# Patient Record
Sex: MALE | Race: WHITE | Employment: OTHER | URBAN - METROPOLITAN AREA
[De-identification: names, ages, dates, MRNs, and addresses within clinical notes are randomized per-mention and may not be internally consistent; named-entity substitution may affect disease eponyms.]

---

## 2018-01-08 ENCOUNTER — HOSPITAL ENCOUNTER (OUTPATIENT)
Age: 73
Setting detail: OUTPATIENT SURGERY
Discharge: HOME | End: 2018-01-08
Attending: INTERNAL MEDICINE | Admitting: INTERNAL MEDICINE
Payer: MEDICARE

## 2018-01-08 ENCOUNTER — ANESTHESIA EVENT (OUTPATIENT)
Dept: ENDOSCOPY | Age: 73
End: 2018-01-08
Payer: MEDICARE

## 2018-01-08 ENCOUNTER — ANESTHESIA (OUTPATIENT)
Dept: ENDOSCOPY | Age: 73
End: 2018-01-08
Payer: MEDICARE

## 2018-01-08 VITALS
WEIGHT: 185 LBS | RESPIRATION RATE: 16 BRPM | BODY MASS INDEX: 25.9 KG/M2 | TEMPERATURE: 97.1 F | DIASTOLIC BLOOD PRESSURE: 70 MMHG | HEIGHT: 71 IN | HEART RATE: 66 BPM | OXYGEN SATURATION: 99 % | SYSTOLIC BLOOD PRESSURE: 111 MMHG

## 2018-01-08 PROCEDURE — 88305 TISSUE EXAM BY PATHOLOGIST: CPT | Performed by: INTERNAL MEDICINE

## 2018-01-08 PROCEDURE — 76060000031 HC ANESTHESIA FIRST 0.5 HR: Performed by: INTERNAL MEDICINE

## 2018-01-08 PROCEDURE — 77030027957 HC TBNG IRR ENDOGTR BUSS -B: Performed by: INTERNAL MEDICINE

## 2018-01-08 PROCEDURE — 76040000019: Performed by: INTERNAL MEDICINE

## 2018-01-08 PROCEDURE — 74011000250 HC RX REV CODE- 250

## 2018-01-08 PROCEDURE — 74011250636 HC RX REV CODE- 250/636

## 2018-01-08 PROCEDURE — 77030009426 HC FCPS BIOP ENDOSC BSC -B: Performed by: INTERNAL MEDICINE

## 2018-01-08 RX ORDER — FENTANYL CITRATE 50 UG/ML
100 INJECTION, SOLUTION INTRAMUSCULAR; INTRAVENOUS
Status: DISCONTINUED | OUTPATIENT
Start: 2018-01-08 | End: 2018-01-08 | Stop reason: HOSPADM

## 2018-01-08 RX ORDER — DIPHENHYDRAMINE HYDROCHLORIDE 50 MG/ML
50 INJECTION, SOLUTION INTRAMUSCULAR; INTRAVENOUS ONCE
Status: DISCONTINUED | OUTPATIENT
Start: 2018-01-08 | End: 2018-01-08 | Stop reason: HOSPADM

## 2018-01-08 RX ORDER — SODIUM CHLORIDE 9 MG/ML
100 INJECTION, SOLUTION INTRAVENOUS CONTINUOUS
Status: DISCONTINUED | OUTPATIENT
Start: 2018-01-08 | End: 2018-01-08 | Stop reason: HOSPADM

## 2018-01-08 RX ORDER — MIDAZOLAM HYDROCHLORIDE 1 MG/ML
.25-1 INJECTION, SOLUTION INTRAMUSCULAR; INTRAVENOUS
Status: DISCONTINUED | OUTPATIENT
Start: 2018-01-08 | End: 2018-01-08 | Stop reason: HOSPADM

## 2018-01-08 RX ORDER — FLUMAZENIL 0.1 MG/ML
0.2 INJECTION INTRAVENOUS
Status: DISCONTINUED | OUTPATIENT
Start: 2018-01-08 | End: 2018-01-08 | Stop reason: HOSPADM

## 2018-01-08 RX ORDER — SODIUM CHLORIDE 0.9 % (FLUSH) 0.9 %
5-10 SYRINGE (ML) INJECTION EVERY 8 HOURS
Status: DISCONTINUED | OUTPATIENT
Start: 2018-01-08 | End: 2018-01-08 | Stop reason: HOSPADM

## 2018-01-08 RX ORDER — PROPOFOL 10 MG/ML
INJECTION, EMULSION INTRAVENOUS AS NEEDED
Status: DISCONTINUED | OUTPATIENT
Start: 2018-01-08 | End: 2018-01-08 | Stop reason: HOSPADM

## 2018-01-08 RX ORDER — LIDOCAINE HYDROCHLORIDE 20 MG/ML
INJECTION, SOLUTION EPIDURAL; INFILTRATION; INTRACAUDAL; PERINEURAL AS NEEDED
Status: DISCONTINUED | OUTPATIENT
Start: 2018-01-08 | End: 2018-01-08 | Stop reason: HOSPADM

## 2018-01-08 RX ORDER — SODIUM CHLORIDE 0.9 % (FLUSH) 0.9 %
5-10 SYRINGE (ML) INJECTION AS NEEDED
Status: DISCONTINUED | OUTPATIENT
Start: 2018-01-08 | End: 2018-01-08 | Stop reason: HOSPADM

## 2018-01-08 RX ORDER — NALOXONE HYDROCHLORIDE 0.4 MG/ML
0.4 INJECTION, SOLUTION INTRAMUSCULAR; INTRAVENOUS; SUBCUTANEOUS
Status: DISCONTINUED | OUTPATIENT
Start: 2018-01-08 | End: 2018-01-08 | Stop reason: HOSPADM

## 2018-01-08 RX ORDER — ATROPINE SULFATE 0.1 MG/ML
0.5 INJECTION INTRAVENOUS
Status: DISCONTINUED | OUTPATIENT
Start: 2018-01-08 | End: 2018-01-08 | Stop reason: HOSPADM

## 2018-01-08 RX ORDER — SODIUM CHLORIDE 9 MG/ML
INJECTION, SOLUTION INTRAVENOUS
Status: DISCONTINUED | OUTPATIENT
Start: 2018-01-08 | End: 2018-01-08 | Stop reason: HOSPADM

## 2018-01-08 RX ORDER — DEXTROMETHORPHAN/PSEUDOEPHED 2.5-7.5/.8
1.2 DROPS ORAL
Status: DISCONTINUED | OUTPATIENT
Start: 2018-01-08 | End: 2018-01-08 | Stop reason: HOSPADM

## 2018-01-08 RX ORDER — EPINEPHRINE 0.1 MG/ML
1 INJECTION INTRACARDIAC; INTRAVENOUS
Status: DISCONTINUED | OUTPATIENT
Start: 2018-01-08 | End: 2018-01-08 | Stop reason: HOSPADM

## 2018-01-08 RX ADMIN — PROPOFOL 30 MG: 10 INJECTION, EMULSION INTRAVENOUS at 09:09

## 2018-01-08 RX ADMIN — PROPOFOL 40 MG: 10 INJECTION, EMULSION INTRAVENOUS at 08:59

## 2018-01-08 RX ADMIN — PROPOFOL 20 MG: 10 INJECTION, EMULSION INTRAVENOUS at 09:12

## 2018-01-08 RX ADMIN — PROPOFOL 30 MG: 10 INJECTION, EMULSION INTRAVENOUS at 08:55

## 2018-01-08 RX ADMIN — PROPOFOL 30 MG: 10 INJECTION, EMULSION INTRAVENOUS at 08:57

## 2018-01-08 RX ADMIN — LIDOCAINE HYDROCHLORIDE 20 MG: 20 INJECTION, SOLUTION EPIDURAL; INFILTRATION; INTRACAUDAL; PERINEURAL at 08:51

## 2018-01-08 RX ADMIN — PROPOFOL 30 MG: 10 INJECTION, EMULSION INTRAVENOUS at 09:01

## 2018-01-08 RX ADMIN — PROPOFOL 50 MG: 10 INJECTION, EMULSION INTRAVENOUS at 08:52

## 2018-01-08 RX ADMIN — SODIUM CHLORIDE: 9 INJECTION, SOLUTION INTRAVENOUS at 08:49

## 2018-01-08 RX ADMIN — PROPOFOL 20 MG: 10 INJECTION, EMULSION INTRAVENOUS at 09:03

## 2018-01-08 NOTE — DISCHARGE INSTRUCTIONS
[address]  174 [facility], [address] [place] [address]    COLON DISCHARGE INSTRUCTIONS    Tree Giullen  [id number]  1945    Discomfort:  Redness at IV site- apply warm compress to area; if redness or soreness persist- contact your physician  There may be a slight amount of blood passed from the rectum  Gaseous discomfort- walking, belching will help relieve any discomfort  You may not operate a vehicle for 12 hours  You may not engage in an occupation involving machinery or appliances for rest of today  You may not drink alcoholic beverages for at least 12 hours  Avoid making any critical decisions for at least 24 hour  DIET:  You may resume your regular diet - however -  remember your colon is empty and a heavy meal will produce gas. Avoid these foods:  vegetables, fried / greasy foods, carbonated drinks     ACTIVITY:  You may  resume your normal daily activities it is recommended that you spend the remainder of the day resting -  avoid any strenuous activity. CALL M.D. ANY SIGN OF:   Increasing pain, nausea, vomiting  Abdominal distension (swelling)  New increased bleeding (oral or rectal)  Fever (chills)  Pain in chest area  Bloody discharge from nose or mouth  Shortness of breath      Follow-up Instructions:   Call Dr. Frantz Anderson for any questions or problems at [address]:   Your colonoscopy showed four polyps, which were removed. We will contact you about the pathology results and when your next colonoscopy will be due. Please maintain a high fiber diet. Telephone # [phone]      Signed By: Julia Paige MD     1/8/2018  9:24 AM       DISCHARGE SUMMARY from Nurse    The following personal items collected during your admission are returned to you:   Dental Appliance: Dental Appliances: None  Vision: Visual Aid: None  Hearing Aid:    Jewelry:    Clothing:    Other Valuables:    Valuables sent to safe:

## 2018-01-08 NOTE — PROCEDURES
Jacky 66  174 [facility], [address] [place] [address]        Colonoscopy Operative Report    Erik Larios  [id number]  1945      Procedure Type:   Colonoscopy with polypectomy (cold biopsy)     Indications:    Personal history of colon polyps (screening only)         Pre-operative Diagnosis: see indication above    Post-operative Diagnosis:  See findings below    :  Jessica Hyatt. Ivy Rivera MD      Referring Provider: Gordon Jenkins MD      Sedation:  MAC anesthesia Propofol      Procedure Details:  After informed consent was obtained with all risks and benefits of procedure explained and preoperative exam completed, the patient was taken to the endoscopy suite and placed in the left lateral decubitus position. Upon sequential sedation as per above, a digital rectal exam was performed demonstrating internal hemorrhoids. The Olympus adult videocolonoscope  was inserted in the rectum and carefully advanced to the terminal ileum. The cecum was identified by the ileocecal valve and appendiceal orifice. The quality of preparation was good. The colonoscope was slowly withdrawn with careful evaluation between folds. Retroflexion in the rectum was completed . Findings:   Rectum: single rectal polyp 4-5 mm removed with cold biopsy forceps. Small internal hemorrhoids. Sigmoid: three polyps sessile each 2-3 mm removed with cold biopsy forceps. Moderate diverticulosis  Descending Colon: moderate diverticulosis  Transverse Colon: normal  Ascending Colon: normal  Cecum: normal  Terminal Ileum: normal      Specimen Removed: 1. Sigmoid colon polyps, 2. Rectal polyp    Complications: None. EBL:  None. Impression:    1. Colon polyps - removed  2. Moderate left-sided diverticulosis  3. Small internal hemorrhoids    Recommendations:  1. Follow up surgical pathology  2. Repeat colonoscopy in 5 years  3. High fiber diet education    Signed By: Jessica Hyatt.  Ivy Rivera MD     1/8/2018  9:26 AM

## 2018-01-08 NOTE — PROGRESS NOTES

## 2018-01-08 NOTE — IP AVS SNAPSHOT
[address] 
[phone] Patient: Tri Zepeda. MRN: [id number] UIB:[id number] About your hospitalization You were admitted on:  January 8, 2018 You last received care in the:  [facility] ENDOSCOPY You were discharged on:  January 8, 2018 Why you were hospitalized Your primary diagnosis was:  Not on File Follow-up Information None Discharge Orders None A check evens indicates which time of day the medication should be taken. My Medications CONTINUE taking these medications Instructions Each Dose to Equal  
 Morning Noon Evening Bedtime  
 lisinopril 10 mg tablet Commonly known as:  Alonzo Charlotte Your last dose was: Your next dose is: Take  by mouth daily. metoprolol tartrate 25 mg tablet Commonly known as:  LOPRESSOR Your last dose was: Your next dose is: Take 0.5 Tabs by mouth every twelve (12) hours. 12.5 mg  
    
   
   
   
  
 pravastatin 20 mg tablet Commonly known as:  PRAVACHOL Your last dose was: Your next dose is: Take 1 Tab by mouth nightly. 20 mg  
    
   
   
   
  
 TRICOR 48 mg tablet Generic drug:  fenofibrate nanocrystallized Your last dose was: Your next dose is: Take 48 mg by mouth. 48 mg Discharge Instructions [address] 
[address] [place] [address] COLON DISCHARGE INSTRUCTIONS Tri Zepeda. 
[id number] 
1945 Discomfort: 
Redness at IV site- apply warm compress to area; if redness or soreness persist- contact your physician There may be a slight amount of blood passed from the rectum Gaseous discomfort- walking, belching will help relieve any discomfort You may not operate a vehicle for 12 hours You may not engage in an occupation involving machinery or appliances for rest of today You may not drink alcoholic beverages for at least 12 hours Avoid making any critical decisions for at least 24 hour DIET: 
You may resume your regular diet  however -  remember your colon is empty and a heavy meal will produce gas. Avoid these foods:  vegetables, fried / greasy foods, carbonated drinks ACTIVITY: 
You may  resume your normal daily activities it is recommended that you spend the remainder of the day resting -  avoid any strenuous activity. CALL M.D. ANY SIGN OF: Increasing pain, nausea, vomiting Abdominal distension (swelling) New increased bleeding (oral or rectal) Fever (chills) Pain in chest area Bloody discharge from nose or mouth Shortness of breath Follow-up Instructions: 
 Call Dr. Suzette Jenkins for any questions or problems at [phone] ENDOSCOPY FINDINGS: 
 Your colonoscopy showed four polyps, which were removed. We will contact you about the pathology results and when your next colonoscopy will be due. Please maintain a high fiber diet. Telephone # [phone] Signed By: Víctor Sinclair MD   
 1/8/2018  9:24 AM 
  
 
DISCHARGE SUMMARY from Nurse The following personal items collected during your admission are returned to you:  
Dental Appliance: Dental Appliances: None Vision: Visual Aid: None Hearing Aid:   
Jewelry:   
Clothing:   
Other Valuables:   
Valuables sent to safe:   
 
 
 
  
  
  
Introducing [facility]! Kathia Dinh introduces [employer] patient portal. Now you can access parts of your medical record, email your doctor's office, and request medication refills online. 1. In your internet browser, go to [email or web address] 2. Click on the First Time User? Click Here link in the Sign In box. You will see the New Member Sign Up page. 3. Enter your [employer] Access Code exactly as it appears below.  You will not need to use this code after youve completed the sign-up process. If you do not sign up before the expiration date, you must request a new code. · [employer] Access Code: [email or web address] Expires: 4/8/2018  9:29 AM 
 
4. Enter the last four digits of your Social Security Number (xxxx) and Date of Birth (mm/dd/yyyy) as indicated and click Submit. You will be taken to the next sign-up page. 5. Create a [employer] ID. This will be your [employer] login ID and cannot be changed, so think of one that is secure and easy to remember. 6. Create a [employer] password. You can change your password at any time. 7. Enter your Password Reset Question and Answer. This can be used at a later time if you forget your password. 8. Enter your e-mail address. You will receive e-mail notification when new information is available in [address] 9. Click Sign Up. You can now view and download portions of your medical record. 10. Click the Download Summary menu link to download a portable copy of your medical information. If you have questions, please visit the Frequently Asked Questions section of the [employer] website. Remember, [employer] is NOT to be used for urgent needs. For medical emergencies, dial 911. Now available from your iPhone and Android! Providers Seen During Your Hospitalization Provider Specialty Primary office phone Tony Guzman MD Gastroenterology [phone] Your Primary Care Physician (PCP) Primary Care Physician Office Phone Office Fax Obey Valdez [phone] You are allergic to the following Allergen Reactions Penicillin G Rash Recent Documentation Height Weight BMI Smoking Status 1.803 m 83.9 kg 25.8 kg/m2 Current Every Day Smoker Emergency Contacts Name Discharge Info Relation Home Work Mobile Bryan Oliveros  Friend [5] [phone] Emmie Acuna  Other Relative [6] [phone] Patient Belongings The following personal items are in your possession at time of discharge: 
  Dental Appliances: None  Visual Aid: None Please provide this summary of care documentation to your next provider. Signatures-by signing, you are acknowledging that this After Visit Summary has been reviewed with you and you have received a copy. Patient Signature:  ____________________________________________________________ Date:  ____________________________________________________________  
  
[facility] Sa[facility] Provider Signature:  ____________________________________________________________ Date:  ____________________________________________________________

## 2018-01-08 NOTE — ROUTINE PROCESS
Sanjuana Hanna  1945  [id number]    Situation:  Verbal report received from: Meron Vail  Procedure: Procedure(s):  COLONOSCOPY  ENDOSCOPIC POLYPECTOMY    Background:    Preoperative diagnosis: SCREENING  Postoperative diagnosis: 1.- Diverticulosis  2.- Colonic Polyps    :  Dr. Amisha Jim  Assistant(s): Endoscopy Technician-1: Rey Higuera IV  Endoscopy RN-1: Vianey Morales RN    Specimens:   ID Type Source Tests Collected by Time Destination   1 : Simoid Colon Polyp Preservative   Tony Jim MD 1/8/2018 4306 Pathology   2 : Rectal Colon Polyp Preservative   Tony Jim MD 1/8/2018 3228 Pathology     H. Pylori  no    Assessment:  Intra-procedure medications     Anesthesia gave intra-procedure sedation and medications, see anesthesia flow sheet yes    Intravenous fluids: NS@ KVO     Vital signs stable     Abdominal assessment: round and soft     Recommendation:  Discharge patient per MD order.     Family or Friend   Permission to share finding with family or friend yes

## 2018-01-08 NOTE — ANESTHESIA POSTPROCEDURE EVALUATION
Post-Anesthesia Evaluation and Assessment    Patient: Gricelda Oneil MRN: [id number]  SSN: [id number]    YOB: 1945  Age: 81 y.o. Sex: male       Cardiovascular Function/Vital Signs  Visit Vitals    /70    Pulse 75    Temp 36.7 °C (98.1 °F)    Resp 17    Ht 5' 11\" (1.803 m)    Wt 83.9 kg (185 lb)    SpO2 95%    BMI 25.8 kg/m2       Patient is status post MAC anesthesia for Procedure(s):  COLONOSCOPY  ENDOSCOPIC POLYPECTOMY. Nausea/Vomiting: None    Postoperative hydration reviewed and adequate. Pain:  Pain Scale 1: Numeric (0 - 10) (01/08/18 0931)  Pain Intensity 1: 0 (01/08/18 0931)   Managed    Neurological Status: At baseline    Mental Status and Level of Consciousness: Arousable    Pulmonary Status:   O2 Device: CO2 nasal cannula (01/08/18 0914)   Adequate oxygenation and airway patent    Complications related to anesthesia: None    Post-anesthesia assessment completed.  No concerns    Signed By: Barry Bhatti MD     January 8, 2018

## 2018-01-08 NOTE — ANESTHESIA PREPROCEDURE EVALUATION
Anesthetic History   No history of anesthetic complications            Review of Systems / Medical History  Patient summary reviewed, nursing notes reviewed and pertinent labs reviewed    Pulmonary          Smoker         Neuro/Psych   Within defined limits           Cardiovascular          CHF: NYHA Classification II    CAD and cardiac stents    Exercise tolerance: >4 METS  Comments: EF 35%   GI/Hepatic/Renal               Comments: screening Endo/Other        Arthritis     Other Findings            Physical Exam    Airway  Mallampati: I  TM Distance: > 6 cm  Neck ROM: normal range of motion   Mouth opening: Normal     Cardiovascular    Rhythm: regular  Rate: normal         Dental  No notable dental hx       Pulmonary  Breath sounds clear to auscultation               Abdominal         Other Findings            Anesthetic Plan    ASA: 3  Anesthesia type: MAC          Induction: Intravenous  Anesthetic plan and risks discussed with: Patient

## 2018-01-08 NOTE — H&P
[address]      History and Physical       NAME:  Kaley Angulo. :   1945   MRN:   [id number]             History of Present Illness:  Patient is a 81 y.o. who is seen for first screening colonoscopy. PMH:  Past Medical History:   Diagnosis Date    Aneurysm ([address]     abdominal    Arthritis     CAD (coronary artery disease)     heart attack    Heart failure ([facility] [facility]     CHF    Ill-defined condition 2017    nuclear stress test - led to catherization being done    Ill-defined condition     pre-COPD    Ill-defined condition     pre-diabetes       PSH:  Past Surgical History:   Procedure Laterality Date    CARDIAC SURG PROCEDURE UNLIST  13    CABG    CARDIAC SURG PROCEDURE UNLIST  , ,     stents    HX HEART CATHETERIZATION  2017    abdominal aneurysm    HX HERNIA REPAIR      HX ORTHOPAEDIC      neck    HX ORTHOPAEDIC      left knee arthroscopic surgery    HX ORTHOPAEDIC      right foot Pascal's neuroma    HX TONSILLECTOMY         Allergies: Allergies   Allergen Reactions    Penicillin G Rash       Home Medications:  Prior to Admission Medications   Prescriptions Last Dose Informant Patient Reported? Taking?   fenofibrate nanocrystallized (TRICOR) 48 mg tablet 2018 at Unknown time  Yes Yes   Sig: Take 48 mg by mouth.   lisinopril (PRINIVIL, ZESTRIL) 10 mg tablet 2018 at Unknown time  Yes Yes   Sig: Take  by mouth daily. metoprolol (LOPRESSOR) 25 mg tablet 2017 at Unknown time  No Yes   Sig: Take 0.5 Tabs by mouth every twelve (12) hours. pravastatin (PRAVACHOL) 20 mg tablet 2017 at Unknown time  No Yes   Sig: Take 1 Tab by mouth nightly.       Facility-Administered Medications: None       Hospital Medications:  Current Facility-Administered Medications   Medication Dose Route Frequency    0.9% sodium chloride infusion  100 mL/hr IntraVENous CONTINUOUS    sodium chloride (NS) flush 5-10 mL  5-10 mL IntraVENous Q8H    sodium chloride (NS) flush 5-10 mL  5-10 mL IntraVENous PRN    midazolam (VERSED) injection 0.25-10 mg  0.25-10 mg IntraVENous Multiple    fentaNYL citrate (PF) injection 100 mcg  100 mcg IntraVENous Multiple    naloxone (NARCAN) injection 0.4 mg  0.4 mg IntraVENous Multiple    flumazenil (ROMAZICON) 0.1 mg/mL injection 0.2 mg  0.2 mg IntraVENous Multiple    simethicone (MYLICON) 26OD/6.6LY oral drops 80 mg  1.2 mL Oral Multiple    diphenhydrAMINE (BENADRYL) injection 50 mg  50 mg IntraVENous ONCE    atropine injection 0.5 mg  0.5 mg IntraVENous ONCE PRN    EPINEPHrine (ADRENALIN) 0.1 mg/mL syringe 1 mg  1 mg Endoscopically ONCE PRN       Social History:  Social History   Substance Use Topics    Smoking status: Current Every Day Smoker     Packs/day: 1.50     Years: 40.00     Last attempt to quit: 4/1/2013    Smokeless tobacco: Never Used      Comment: started smoking again    Alcohol use Yes      Comment: drinks everything, once a week on weekend - less than this per pt on 1/5/2017       Family History:  Family History   Problem Relation Age of Onset    Arthritis-osteo Mother     Asthma Mother     Diabetes Mother     Elevated Lipids Mother     Heart Disease Mother     Hypertension Mother     Heart Disease Father     Arthritis-osteo Sister     Diabetes Sister     Elevated Lipids Sister     Heart Disease Maternal Grandmother     Heart Disease Maternal Grandfather     Stroke Maternal Grandfather     Heart Disease Paternal Grandmother     Heart Disease Paternal Grandfather              Review of Systems:      Constitutional: negative fever, negative chills, negative weight loss  Eyes:   negative visual changes  ENT:   negative sore throat, tongue or lip swelling  Respiratory:  negative cough, negative dyspnea  Cards:  negative for chest pain, palpitations, lower extremity edema  GI:   See HPI  :  negative for frequency, dysuria  Integument:  negative for rash and pruritus  Heme:  negative for easy bruising and gum/nose bleeding  Musculoskel: negative for myalgias,  back pain and muscle weakness  Neuro: negative for headaches, dizziness, vertigo  Psych:  negative for feelings of anxiety, depression       Objective:   Patient Vitals for the past 8 hrs:   BP Temp Pulse Resp SpO2 Height Weight   01/08/18 0832 128/83 98.1 °F (36.7 °C) 84 23 95 % 5' 11\" (1.803 m) 83.9 kg (185 lb)             EXAM:     NEURO-a&o   HEENT-wnl   LUNGS-clear    COR-regular rate and rhythym     ABD-soft , no tenderness, no rebound, good bs     EXT-no edema     Data Review     No results for input(s): WBC, HGB, HCT, PLT, HGBEXT, HCTEXT, PLTEXT in the last 72 hours. No results for input(s): NA, K, CL, CO2, BUN, CREA, GLU, PHOS, CA in the last 72 hours. No results for input(s): SGOT, GPT, AP, TBIL, TP, ALB, GLOB, GGT, AML, LPSE in the last 72 hours. No lab exists for component: AMYP, HLPSE  No results for input(s): INR, PTP, APTT in the last 72 hours. No lab exists for component: INREXT       Assessment:   · Colon cancer screening     Patient Active Problem List   Diagnosis Code    CAD (coronary artery disease) I25.10    S/P CABG x 4 Z95.1     Plan:   · Endoscopic procedure with MAC     Signed By: Mirtha Sanders.  Mago Reinoso MD     1/8/2018  8:50 AM

## 2018-01-31 ENCOUNTER — HOSPITAL ENCOUNTER (OUTPATIENT)
Dept: CARDIAC CATH/INVASIVE PROCEDURES | Age: 73
Discharge: HOME | End: 2018-01-31
Attending: INTERNAL MEDICINE | Admitting: INTERNAL MEDICINE
Payer: MEDICARE

## 2018-01-31 VITALS
HEIGHT: 71 IN | DIASTOLIC BLOOD PRESSURE: 70 MMHG | SYSTOLIC BLOOD PRESSURE: 126 MMHG | HEART RATE: 57 BPM | WEIGHT: 187 LBS | OXYGEN SATURATION: 94 % | TEMPERATURE: 98.9 F | BODY MASS INDEX: 26.18 KG/M2 | RESPIRATION RATE: 15 BRPM

## 2018-01-31 LAB
GLUCOSE BLD STRIP.AUTO-MCNC: 116 MG/DL (ref 65–100)
SERVICE CMNT-IMP: ABNORMAL

## 2018-01-31 PROCEDURE — 74011000250 HC RX REV CODE- 250: Performed by: INTERNAL MEDICINE

## 2018-01-31 PROCEDURE — 82962 GLUCOSE BLOOD TEST: CPT

## 2018-01-31 PROCEDURE — 74011250636 HC RX REV CODE- 250/636: Performed by: INTERNAL MEDICINE

## 2018-01-31 PROCEDURE — C1894 INTRO/SHEATH, NON-LASER: HCPCS

## 2018-01-31 PROCEDURE — C1760 CLOSURE DEV, VASC: HCPCS

## 2018-01-31 PROCEDURE — 74011636320 HC RX REV CODE- 636/320: Performed by: INTERNAL MEDICINE

## 2018-01-31 PROCEDURE — 99152 MOD SED SAME PHYS/QHP 5/>YRS: CPT

## 2018-01-31 PROCEDURE — 77030004532 HC CATH ANGI DX IMP BSC -A

## 2018-01-31 PROCEDURE — 77030013744

## 2018-01-31 RX ORDER — MIDAZOLAM HYDROCHLORIDE 1 MG/ML
.5-1 INJECTION, SOLUTION INTRAMUSCULAR; INTRAVENOUS
Status: DISCONTINUED | OUTPATIENT
Start: 2018-01-31 | End: 2018-01-31

## 2018-01-31 RX ORDER — LIDOCAINE HYDROCHLORIDE 10 MG/ML
10-30 INJECTION INFILTRATION; PERINEURAL
Status: DISCONTINUED | OUTPATIENT
Start: 2018-01-31 | End: 2018-01-31

## 2018-01-31 RX ORDER — ATROPINE SULFATE 0.1 MG/ML
1 INJECTION INTRAVENOUS AS NEEDED
Status: DISCONTINUED | OUTPATIENT
Start: 2018-01-31 | End: 2018-01-31

## 2018-01-31 RX ORDER — SODIUM CHLORIDE 0.9 % (FLUSH) 0.9 %
10 SYRINGE (ML) INJECTION AS NEEDED
Status: DISCONTINUED | OUTPATIENT
Start: 2018-01-31 | End: 2018-01-31

## 2018-01-31 RX ORDER — HEPARIN SODIUM 1000 [USP'U]/ML
1000-5000 INJECTION, SOLUTION INTRAVENOUS; SUBCUTANEOUS
Status: DISCONTINUED | OUTPATIENT
Start: 2018-01-31 | End: 2018-01-31

## 2018-01-31 RX ORDER — LOSARTAN POTASSIUM 50 MG/1
50 TABLET ORAL DAILY
COMMUNITY

## 2018-01-31 RX ORDER — SODIUM CHLORIDE 9 MG/ML
25 INJECTION, SOLUTION INTRAVENOUS CONTINUOUS
Status: DISCONTINUED | OUTPATIENT
Start: 2018-01-31 | End: 2018-01-31 | Stop reason: HOSPADM

## 2018-01-31 RX ORDER — CLOPIDOGREL 300 MG/1
600 TABLET, FILM COATED ORAL AS NEEDED
Status: DISCONTINUED | OUTPATIENT
Start: 2018-01-31 | End: 2018-01-31

## 2018-01-31 RX ORDER — FENTANYL CITRATE 50 UG/ML
25-200 INJECTION, SOLUTION INTRAMUSCULAR; INTRAVENOUS
Status: DISCONTINUED | OUTPATIENT
Start: 2018-01-31 | End: 2018-01-31

## 2018-01-31 RX ORDER — ASPIRIN 81 MG/1
81 TABLET ORAL DAILY
COMMUNITY

## 2018-01-31 RX ORDER — HEPARIN SODIUM 200 [USP'U]/100ML
1000 INJECTION, SOLUTION INTRAVENOUS CONTINUOUS
Status: DISCONTINUED | OUTPATIENT
Start: 2018-01-31 | End: 2018-01-31

## 2018-01-31 RX ORDER — OMEPRAZOLE 40 MG/1
40 CAPSULE, DELAYED RELEASE ORAL
COMMUNITY

## 2018-01-31 RX ADMIN — Medication 2000 UNITS: at 09:50

## 2018-01-31 RX ADMIN — LIDOCAINE HYDROCHLORIDE 10 ML: 10 INJECTION, SOLUTION INFILTRATION; PERINEURAL at 09:58

## 2018-01-31 RX ADMIN — FENTANYL CITRATE 25 MCG: 50 INJECTION, SOLUTION INTRAMUSCULAR; INTRAVENOUS at 10:01

## 2018-01-31 RX ADMIN — IOPAMIDOL 43 ML: 755 INJECTION, SOLUTION INTRAVENOUS at 10:13

## 2018-01-31 RX ADMIN — MIDAZOLAM HYDROCHLORIDE 1 MG: 1 INJECTION, SOLUTION INTRAMUSCULAR; INTRAVENOUS at 10:01

## 2018-01-31 RX ADMIN — MIDAZOLAM HYDROCHLORIDE 2 MG: 1 INJECTION, SOLUTION INTRAMUSCULAR; INTRAVENOUS at 09:49

## 2018-01-31 RX ADMIN — FENTANYL CITRATE 25 MCG: 50 INJECTION, SOLUTION INTRAMUSCULAR; INTRAVENOUS at 09:56

## 2018-01-31 RX ADMIN — FENTANYL CITRATE 50 MCG: 50 INJECTION, SOLUTION INTRAMUSCULAR; INTRAVENOUS at 09:50

## 2018-01-31 RX ADMIN — SODIUM CHLORIDE 25 ML/HR: 900 INJECTION, SOLUTION INTRAVENOUS at 08:35

## 2018-01-31 RX ADMIN — MIDAZOLAM HYDROCHLORIDE 2 MG: 1 INJECTION, SOLUTION INTRAMUSCULAR; INTRAVENOUS at 09:56

## 2018-01-31 NOTE — DISCHARGE INSTRUCTIONS
[email or web address]    Patient Discharge Instructions    Cristhian Purcell / [id number] : 1945    Admitted 2018 Discharged: 2018       · It is important that you take the medication exactly as they are prescribed. · Keep your medication in the bottles provided by the pharmacist and keep a list of the medication names, dosages, and times to be taken in your wallet. · Do not take other medications without consulting your doctor. BRING ALL OF YOUR MEDICINES TO YOUR OFFICE VISIT with Dr. Asif Dumont with Nataly Umaña MD in 1 month      Cardiac Catheterization  Discharge Instructions     Do not drive, operate any machinery, or sign any legal documents for 24 hours after your procedure. You must have someone to drive you home.  You may take a shower 24 hours after your cardiac catheterization. Be sure to get the dressing wet and then remove it; gently wash the area with warm soapy water. Pat dry and leave open to air. To help prevent infections, be sure to keep the cath site clean and dry. No lotions, creams, powders, ointments, etc. in the cath site for approximately 1 week.  Do not take a tub bath, get in a hot tub or swimming pool for approximately 5 days or until the cath site is completely healed.  No strenuous activity or heavy lifting over 10 lbs. for 7 days.  Drink plenty of fluids for 24-48 hours after your cath to flush the contrast dye from your kidneys. No alcoholic beverages for 24 hours. You may resume your previous diet (low fat, low cholesterol) after your cath.  After your cath, some bruising or discomfort is common during the healing process. Tylenol, 1-2 tablets every 6 hours as needed, is recommended if you experience any discomfort.   If you experience any signs or symptoms of infection such as fever, chills, or poorly healing incision, persistent tenderness or swelling in the groin, redness and/or warmth to the touch, numbness, significant tingling or pain at the groin site or affected extremity, rash, drainage from the cath site, or if the leg feels tight or swollen, call your physician right away.  If bleeding at the cath site occurs, take a clean gauze pad and apply direct pressure to the groin just above the puncture site. Call 911 immediately, and continue to apply direct pressure until an ambulance gets to your location.  You may return to work  2  days after your cardiac cath if no groin bleeding. Information obtained by :  I understand that if any problems occur once I am at home I am to contact my physician. I understand and acknowledge receipt of the instructions indicated above. R.N.'s Signature                                                                  Date/Time                                                                                                                                              Patient or Representative Signature                                                          Date/Time      Michelle Del Angel MD      [address] [place] [address]   [phone]  [place], [address] [facility]    [email or web address]

## 2018-01-31 NOTE — PROGRESS NOTES
Tolerated food and drink. 1245 ambulated 75 ft, gait steady, voided  Back to stretcher, right groin dsg D&I,  Assisted with dressing.   1300 discharged via w/c with friend,instructions and belongings

## 2018-01-31 NOTE — IP AVS SNAPSHOT
1111 [facility] [address] [place] [address] 
[phone] Patient: Beatrice Jalloh MRN: [id number] UFT:4/19/8136 About your hospitalization You were admitted on:  January 31, 2018 You last received care in the:  [facility], [facility] You were discharged on:  January 31, 2018 Why you were hospitalized Your primary diagnosis was:  Not on File Follow-up Information Follow up With Details Comments Contact Info Robyn Lassiter MD   [address] 
[phone] Mari Powell MD Schedule an appointment as soon as possible for a visit in 1 month  [address] [address] 1400 [address] [place] [address] 
[phone] Discharge Orders None A check evens indicates which time of day the medication should be taken. My Medications CONTINUE taking these medications Instructions Each Dose to Equal  
 Morning Noon Evening Bedtime ANORO ELLIPTA 62.5-25 mcg/actuation inhaler Generic drug:  umeclidinium-vilanterol Your last dose was: Your next dose is: Take 1 Puff by inhalation daily. Pt states he uses only as needed 1 Puff  
    
   
   
   
  
 aspirin delayed-release 81 mg tablet Your last dose was: Your next dose is: Take 81 mg by mouth daily. 81 mg  
    
   
   
   
  
 losartan 50 mg tablet Commonly known as:  COZAAR Your last dose was: Your next dose is: Take 50 mg by mouth daily. 50 mg  
    
   
   
   
  
 metoprolol tartrate 25 mg tablet Commonly known as:  LOPRESSOR Your last dose was: Your next dose is: Take 0.5 Tabs by mouth every twelve (12) hours. 12.5 mg  
    
   
   
   
  
 omeprazole 40 mg capsule Commonly known as:  PRILOSEC Your last dose was: Your next dose is: Take 40 mg by mouth daily as needed.   
 40 mg  
    
   
   
   
 pravastatin 20 mg tablet Commonly known as:  PRAVACHOL Your last dose was: Your next dose is: Take 1 Tab by mouth nightly. 20 mg Discharge Instructions [email or web address] Patient Discharge Instructions Urbano Agosto / [id number] : 1945 Admitted 2018 Discharged: 2018 · It is important that you take the medication exactly as they are prescribed. · Keep your medication in the bottles provided by the pharmacist and keep a list of the medication names, dosages, and times to be taken in your wallet. · Do not take other medications without consulting your doctor. BRING ALL OF YOUR MEDICINES TO YOUR OFFICE VISIT with Dr. Rhonda Gutierrez with Precious Olea MD in 1 month Cardiac Catheterization  Discharge Instructions ? Do not drive, operate any machinery, or sign any legal documents for 24 hours after your procedure. You must have someone to drive you home. ? You may take a shower 24 hours after your cardiac catheterization. Be sure to get the dressing wet and then remove it; gently wash the area with warm soapy water. Pat dry and leave open to air. To help prevent infections, be sure to keep the cath site clean and dry. No lotions, creams, powders, ointments, etc. in the cath site for approximately 1 week. ? Do not take a tub bath, get in a hot tub or swimming pool for approximately 5 days or until the cath site is completely healed. ? No strenuous activity or heavy lifting over 10 lbs. for 7 days. ? Drink plenty of fluids for 24-48 hours after your cath to flush the contrast dye from your kidneys. No alcoholic beverages for 24 hours. You may resume your previous diet (low fat, low cholesterol) after your cath. ? After your cath, some bruising or discomfort is common during the healing process.   Tylenol, 1-2 tablets every 6 hours as needed, is recommended if you experience any discomfort. If you experience any signs or symptoms of infection such as fever, chills, or poorly healing incision, persistent tenderness or swelling in the groin, redness and/or warmth to the touch, numbness, significant tingling or pain at the groin site or affected extremity, rash, drainage from the cath site, or if the leg feels tight or swollen, call your physician right away. ? If bleeding at the cath site occurs, take a clean gauze pad and apply direct pressure to the groin just above the puncture site. Call 911 immediately, and continue to apply direct pressure until an ambulance gets to your location. ? You may return to work  2  days after your cardiac cath if no groin bleeding. Information obtained by : 
I understand that if any problems occur once I am at home I am to contact my physician. I understand and acknowledge receipt of the instructions indicated above. R.N.'s Signature                                                                  Date/Time Patient or Representative Signature                                                          Date/Time Chiquis Joyner MD 
   
[address] [facility], [address]   [phone] [address]    [email or web address] Introducing [facility]! [facility] introduces [employer] patient portal. Now you can access parts of your medical record, email your doctor's office, and request medication refills online. 1. In your internet browser, go to [email or web address] 2. Click on the First Time User? Click Here link in the Sign In box. You will see the New Member Sign Up page. 3. Enter your [facility] Access Code exactly as it appears below. You will not need to use this code after youve completed the sign-up process. If you do not sign up before the expiration date, you must request a new code. · [facility] Access Code: [email or web address] Expires: 4/8/2018  9:29 AM 
 
4. Enter the last four digits of your Social Security Number (xxxx) and Date of Birth (mm/dd/yyyy) as indicated and click Submit. You will be taken to the next sign-up page. 5. Create a [facility] ID. This will be your [facility] login ID and cannot be changed, so think of one that is secure and easy to remember. 6. Create a [facility] password. You can change your password at any time. 7. Enter your Password Reset Question and Answer. This can be used at a later time if you forget your password. 8. Enter your e-mail address. You will receive e-mail notification when new information is available in [address] 9. Click Sign Up. You can now view and download portions of your medical record. 10. Click the Download Summary menu link to download a portable copy of your medical information. If you have questions, please visit the Frequently Asked Questions section of the [facility] website. Remember, [facility] is NOT to be used for urgent needs. For medical emergencies, dial 911. Now available from your iPhone and Android! Providers Seen During Your Hospitalization Provider Specialty Primary office phone Zac Roque MD Cardiology [phone] Your Primary Care Physician (PCP) Primary Care Physician Office Phone Office Fax Caitie Cazares [phone] You are allergic to the following Allergen Reactions Atorvastatin Myalgia Penicillin G Rash Recent Documentation Height Weight BMI Smoking Status 1.803 m 84.8 kg 26.08 kg/m2 Current Every Day Smoker Emergency Contacts Name Discharge Info Relation Home Work Mobile Bryan Oliveros DISCHARGE CAREGIVER [3] Friend [5] [phone] Emmie Acuna  Other Relative [6] [phone] Patient Belongings The following personal items are in your possession at time of discharge: 
     Visual Aid: Contacts, With patient Please provide this summary of care documentation to your next provider. Signatures-by signing, you are acknowledging that this After Visit Summary has been reviewed with you and you have received a copy. Patient Signature:  ____________________________________________________________ Date:  ____________________________________________________________  
  
[facility] Provider Signature:  ____________________________________________________________ Date:  ____________________________________________________________

## 2018-01-31 NOTE — PROGRESS NOTES
Cardiac Cath Lab Recovery Arrival Note:      Leighton Pearson. arrived to Cardiac Cath Lab, Recovery Area. Staff introduced to patient. Patient identifiers verified with NAME and DATE OF BIRTH. Procedure verified with patient. Consent forms reviewed and signed by patient or authorized representative and verified. Allergies verified. Patient and family oriented to department. Patient and family informed of procedure and plan of care. Questions answered with review. Patient prepped for procedure, per orders from physician, prior to arrival.    Patient on cardiac monitor, non-invasive blood pressure, SPO2 monitor. On room air. Patient is A&Ox 4. Patient reports no complaints. Patient in stretcher, in low position, with side rails up, call bell within reach, patient instructed to call if assistance as needed. Patient prep in: [address], [place] [address].    Patient family has pager # [phone]  Family in: friend in hospital.   Prep by: Magda Adame RN

## 2018-01-31 NOTE — PROGRESS NOTES
TRANSFER - IN REPORT:    Verbal report received from Tyshawn Miles on [employer]  being received from procedure for routine progression of care. Report consisted of patients Situation, Background, Assessment and Recommendations(SBAR). Information from the following report(s) Procedure Summary, MAR and Recent Results was reviewed with the receiving clinician. Opportunity for questions and clarification was provided. Assessment completed upon patients arrival to [address] [place] [address] and care assumed. Cardiac Cath Lab Recovery Arrival Note:    [employer] arrived to [address] [facility]. Patient procedure= renal angiogram. Patient on cardiac monitor, non-invasive blood pressure, SPO2 monitor. On  O2 @ 2 lpm via n/c. IV  of nacl on pump at 25 ml/hr. Patient status doing well without problems. Patient is A&Ox 4. Patient reports no complaints. PROCEDURE SITE CHECK:    Procedure site:without any bleeding and or hematoma, no pain/discomfort reported at procedure site. No change in patient status. Continue to monitor patient and status.   Dr Codey Steele talked with pt and friend Mallika Waite)

## 2018-01-31 NOTE — PROGRESS NOTES
0945:   Cardiac Cath Lab Procedure Area Arrival Note:    Malissa Perez. arrived to Cardiac Cath Lab, Procedure Area. Patient identifiers verified with NAME and DATE OF BIRTH. Procedure verified with patient. Consent forms verified. Allergies verified. Patient informed of procedure and plan of care. Questions answered with review. Patient voiced understanding of procedure and plan of care. Patient on cardiac monitor, non-invasive blood pressure, SPO2 monitor. On RA placed on O2 @ 2 lpm via NC.  IV of NS on pump at 25 ml/hr. Patient status doing well without problems. Patient is A&Ox 4. Patient reports no pain. Patient medicated during procedure with orders obtained and verified by Dr. April Park. Refer to patients Cardiac Cath Lab PROCEDURE REPORT for vital signs, assessment, status, and response during procedure, printed at end of case. Printed report on chart or scanned into chart. 1020: Transfer to [address] [place] [address] from Procedure Area    Verbal report given to Petar Conroy on Malissa Perez. being transferred to Cardiac Cath Lab  for routine post - op   Patient is post Renal angio procedure. Patient stable upon transfer to . Report consisted of patients Situation, Background, Assessment and   Recommendations(SBAR). Information from the following report(s) Procedure Summary was reviewed with the receiving nurse. Opportunity for questions and clarification was provided. Patient medicated during procedure with orders obtained and verified by Dr. April Park. Refer to patient PROCEDURE REPORT for vital signs, assessment, status, and response during procedure.

## 2018-05-05 ENCOUNTER — HOSPITAL ENCOUNTER (EMERGENCY)
Age: 73
Discharge: HOME | End: 2018-05-05
Attending: EMERGENCY MEDICINE
Payer: MEDICARE

## 2018-05-05 ENCOUNTER — APPOINTMENT (OUTPATIENT)
Dept: GENERAL RADIOLOGY | Age: 73
End: 2018-05-05
Attending: PHYSICIAN ASSISTANT
Payer: MEDICARE

## 2018-05-05 VITALS
HEIGHT: 71 IN | OXYGEN SATURATION: 95 % | BODY MASS INDEX: 26.04 KG/M2 | HEART RATE: 90 BPM | DIASTOLIC BLOOD PRESSURE: 55 MMHG | SYSTOLIC BLOOD PRESSURE: 128 MMHG | RESPIRATION RATE: 16 BRPM | WEIGHT: 186 LBS | TEMPERATURE: 97.9 F

## 2018-05-05 DIAGNOSIS — J20.9 ACUTE BRONCHITIS, UNSPECIFIED ORGANISM: Primary | ICD-10-CM

## 2018-05-05 LAB
ANION GAP SERPL CALC-SCNC: 7 MMOL/L (ref 5–15)
BASOPHILS # BLD: 0.1 K/UL (ref 0–0.1)
BASOPHILS NFR BLD: 1 % (ref 0–1)
BUN SERPL-MCNC: 15 MG/DL (ref 6–20)
BUN/CREAT SERPL: 14 (ref 12–20)
CALCIUM SERPL-MCNC: 9.3 MG/DL (ref 8.5–10.1)
CHLORIDE SERPL-SCNC: 107 MMOL/L (ref 97–108)
CO2 SERPL-SCNC: 27 MMOL/L (ref 21–32)
CREAT SERPL-MCNC: 1.07 MG/DL (ref 0.7–1.3)
DIFFERENTIAL METHOD BLD: ABNORMAL
EOSINOPHIL # BLD: 0.4 K/UL (ref 0–0.4)
EOSINOPHIL NFR BLD: 3 % (ref 0–7)
ERYTHROCYTE [DISTWIDTH] IN BLOOD BY AUTOMATED COUNT: 12.7 % (ref 11.5–14.5)
GLUCOSE SERPL-MCNC: 112 MG/DL (ref 65–100)
HCT VFR BLD AUTO: 46.4 % (ref 36.6–50.3)
HGB BLD-MCNC: 15.7 G/DL (ref 12.1–17)
IMM GRANULOCYTES # BLD: 0.1 K/UL (ref 0–0.04)
IMM GRANULOCYTES NFR BLD AUTO: 1 % (ref 0–0.5)
LYMPHOCYTES # BLD: 3.1 K/UL (ref 0.8–3.5)
LYMPHOCYTES NFR BLD: 22 % (ref 12–49)
MCH RBC QN AUTO: 32.9 PG (ref 26–34)
MCHC RBC AUTO-ENTMCNC: 33.8 G/DL (ref 30–36.5)
MCV RBC AUTO: 97.3 FL (ref 80–99)
MONOCYTES # BLD: 1 K/UL (ref 0–1)
MONOCYTES NFR BLD: 7 % (ref 5–13)
NEUTS SEG # BLD: 9.7 K/UL (ref 1.8–8)
NEUTS SEG NFR BLD: 67 % (ref 32–75)
NRBC # BLD: 0 K/UL (ref 0–0.01)
NRBC BLD-RTO: 0 PER 100 WBC
PLATELET # BLD AUTO: 183 K/UL (ref 150–400)
PMV BLD AUTO: 11.5 FL (ref 8.9–12.9)
POTASSIUM SERPL-SCNC: 4.1 MMOL/L (ref 3.5–5.1)
RBC # BLD AUTO: 4.77 M/UL (ref 4.1–5.7)
SODIUM SERPL-SCNC: 141 MMOL/L (ref 136–145)
TROPONIN I SERPL-MCNC: <0.04 NG/ML
WBC # BLD AUTO: 14.4 K/UL (ref 4.1–11.1)

## 2018-05-05 PROCEDURE — 71046 X-RAY EXAM CHEST 2 VIEWS: CPT

## 2018-05-05 PROCEDURE — 77030029684 HC NEB SM VOL KT MONA -A

## 2018-05-05 PROCEDURE — 74011250636 HC RX REV CODE- 250/636: Performed by: EMERGENCY MEDICINE

## 2018-05-05 PROCEDURE — 80048 BASIC METABOLIC PNL TOTAL CA: CPT | Performed by: PHYSICIAN ASSISTANT

## 2018-05-05 PROCEDURE — 93005 ELECTROCARDIOGRAM TRACING: CPT

## 2018-05-05 PROCEDURE — 94640 AIRWAY INHALATION TREATMENT: CPT

## 2018-05-05 PROCEDURE — 74011000250 HC RX REV CODE- 250: Performed by: PHYSICIAN ASSISTANT

## 2018-05-05 PROCEDURE — 85025 COMPLETE CBC W/AUTO DIFF WBC: CPT | Performed by: PHYSICIAN ASSISTANT

## 2018-05-05 PROCEDURE — 36415 COLL VENOUS BLD VENIPUNCTURE: CPT | Performed by: PHYSICIAN ASSISTANT

## 2018-05-05 PROCEDURE — 99284 EMERGENCY DEPT VISIT MOD MDM: CPT

## 2018-05-05 PROCEDURE — 96374 THER/PROPH/DIAG INJ IV PUSH: CPT

## 2018-05-05 PROCEDURE — 84484 ASSAY OF TROPONIN QUANT: CPT | Performed by: PHYSICIAN ASSISTANT

## 2018-05-05 PROCEDURE — 74011000250 HC RX REV CODE- 250: Performed by: EMERGENCY MEDICINE

## 2018-05-05 RX ORDER — IPRATROPIUM BROMIDE AND ALBUTEROL SULFATE 2.5; .5 MG/3ML; MG/3ML
3 SOLUTION RESPIRATORY (INHALATION)
Status: DISCONTINUED | OUTPATIENT
Start: 2018-05-05 | End: 2018-05-05

## 2018-05-05 RX ORDER — PREDNISONE 10 MG/1
TABLET ORAL
Qty: 48 TAB | Refills: 0 | Status: SHIPPED | OUTPATIENT
Start: 2018-05-05 | End: 2019-02-15

## 2018-05-05 RX ORDER — LEVOFLOXACIN 750 MG/1
750 TABLET ORAL DAILY
Qty: 10 TAB | Refills: 0 | Status: SHIPPED | OUTPATIENT
Start: 2018-05-05

## 2018-05-05 RX ORDER — IPRATROPIUM BROMIDE AND ALBUTEROL SULFATE 2.5; .5 MG/3ML; MG/3ML
3 SOLUTION RESPIRATORY (INHALATION)
Status: COMPLETED | OUTPATIENT
Start: 2018-05-05 | End: 2018-05-05

## 2018-05-05 RX ORDER — ALBUTEROL SULFATE 0.83 MG/ML
SOLUTION RESPIRATORY (INHALATION)
Status: DISCONTINUED
Start: 2018-05-05 | End: 2018-05-05 | Stop reason: HOSPADM

## 2018-05-05 RX ORDER — IPRATROPIUM BROMIDE AND ALBUTEROL SULFATE 2.5; .5 MG/3ML; MG/3ML
SOLUTION RESPIRATORY (INHALATION)
Status: DISCONTINUED
Start: 2018-05-05 | End: 2018-05-05 | Stop reason: HOSPADM

## 2018-05-05 RX ORDER — LEVOFLOXACIN 750 MG/1
750 TABLET ORAL DAILY
Qty: 10 TAB | Refills: 0 | Status: SHIPPED | OUTPATIENT
Start: 2018-05-05 | End: 2018-05-05

## 2018-05-05 RX ADMIN — METHYLPREDNISOLONE SODIUM SUCCINATE 40 MG: 40 INJECTION, POWDER, FOR SOLUTION INTRAMUSCULAR; INTRAVENOUS at 15:09

## 2018-05-05 RX ADMIN — IPRATROPIUM BROMIDE AND ALBUTEROL SULFATE 3 ML: .5; 3 SOLUTION RESPIRATORY (INHALATION) at 15:50

## 2018-05-05 RX ADMIN — IPRATROPIUM BROMIDE AND ALBUTEROL SULFATE 3 ML: .5; 3 SOLUTION RESPIRATORY (INHALATION) at 15:28

## 2018-05-05 RX ADMIN — ALBUTEROL SULFATE 1 DOSE: 2.5 SOLUTION RESPIRATORY (INHALATION) at 15:03

## 2018-05-05 NOTE — ED NOTES
Pt given discharge instructions. Pt verbalized understanding. Pt given prescriptions. Pt ambulated out of department with steady gait.

## 2018-05-05 NOTE — Clinical Note
Home to rest and drink plenty of fluids and use your medications as directed for infection and breathing.

## 2018-05-05 NOTE — PROGRESS NOTES
2:18 PM  I have evaluated the patient as the Provider in Triage. I have reviewed His vital signs and the triage nurse assessment. I have talked with the patient and any available family and advised that I am the provider in triage and have ordered the appropriate study to initiate their work up based on the clinical presentation during my assessment. I have advised that the patient will be accommodated in the Main ED as soon as possible. I have also requested to contact the triage nurse or myself immediately if the patient experiences any changes in their condition during this brief waiting period. Brenda Franz PA-C    Pt reports hx of COPD notes today since waking up. Notes chest tightness and wheezing. Has used inhaler today without relief. No other complaints.  Brenda Franz PA-C

## 2018-05-05 NOTE — ED TRIAGE NOTES
Pt reports having chronic bronchitis and COPD. Pt states he has been coughing and bring up white thick mucus. Pt also states he has been using his inhaler with minimal relief.

## 2018-05-05 NOTE — ED PROVIDER NOTES
HPI Comments: 72 y.o. male with past medical history significant for arthritis, CAD, CHF, MI, abdominal aneurysm, nuclear stress test and catheterization, pre-COPD, and pre-diabetes who presents to the ED with chief complaint of SOB. Patient reports cough productive of \"white\" mucous, SOB, chest tightness, and slight headache with recent onset. Patient reports using his inhaler for his symptoms, with little relief. Patient reports a history of similar symptoms; reports being diagnosed with chronic bronchitis and pneumonia at that time. Patient reports tobacco use. Patient denies fever, chills, diaphoresis, chest pain, nausea, and vomiting. There are no other acute medical concerns at this time. PCP: Shoshana Nieves MD    Note written by Santa Purvis, as dictated by Bronwyn Roberts MD 2:49 PM     The history is provided by the patient. Past Medical History:   Diagnosis Date    Aneurysm ([facility] [address]     abdominal    Arthritis     CAD (coronary artery disease)     heart attack    Heart failure ([facility] [address]     CHF    Ill-defined condition 12/2017    nuclear stress test - led to catherization being done    Ill-defined condition     pre-COPD    Ill-defined condition     pre-diabetes       Past Surgical History:   Procedure Laterality Date    CARDIAC SURG PROCEDURE UNLIST  4/5/13    CABG   [address], 2006, 2008    stents    COLONOSCOPY N/A 1/8/2018    COLONOSCOPY performed by Shabana Baker.  Korina Nunez MD at [facility] ENDOSCOPY    HX HEART CATHETERIZATION  12/2017    abdominal aneurysm    HX HERNIA REPAIR      HX ORTHOPAEDIC      neck    HX ORTHOPAEDIC      left knee arthroscopic surgery    HX ORTHOPAEDIC      right foot Pascal's neuroma    HX TONSILLECTOMY           Family History:   Problem Relation Age of Onset    Arthritis-osteo Mother     Asthma Mother     Diabetes Mother     Elevated Lipids Mother     Heart Disease Mother     Hypertension Mother     Heart Disease Father     Arthritis-osteo Sister     Diabetes Sister     Elevated Lipids Sister     Heart Disease Maternal Grandmother     Heart Disease Maternal Grandfather     Stroke Maternal Grandfather     Heart Disease Paternal Grandmother     Heart Disease Paternal Grandfather        Social History     Social History    Marital status: SINGLE     Spouse name: N/A    Number of children: N/A    Years of education: N/A     Occupational History    Not on file. Social History Main Topics    Smoking status: Current Every Day Smoker     Packs/day: 1.50     Years: 40.00     Last attempt to quit: 4/1/2013    Smokeless tobacco: Never Used      Comment: started smoking again    Alcohol use Yes      Comment: drinks everything, once a week on weekend - less than this per pt on 1/5/2017    Drug use: Not on file    Sexual activity: Not on file     Other Topics Concern    Not on file     Social History Narrative         ALLERGIES: Atorvastatin and Penicillin g    Review of Systems   Constitutional: Negative for activity change, appetite change and fatigue. HENT: Negative for ear pain, facial swelling, sore throat and trouble swallowing. Eyes: Negative for pain, discharge and visual disturbance. Respiratory: Positive for cough, chest tightness and shortness of breath. Negative for wheezing. Cardiovascular: Negative for chest pain and palpitations. Gastrointestinal: Negative for abdominal pain, blood in stool, nausea and vomiting. Genitourinary: Negative for difficulty urinating, flank pain and hematuria. Musculoskeletal: Negative for arthralgias, joint swelling, myalgias and neck pain. Skin: Negative for color change and rash. Neurological: Positive for headaches. Negative for dizziness, weakness and numbness. Hematological: Negative for adenopathy. Does not bruise/bleed easily. Psychiatric/Behavioral: Negative for behavioral problems, confusion and sleep disturbance.    All other systems reviewed and are negative. Vitals:    05/05/18 1416   BP: 155/84   Pulse: 82   Resp: 16   Temp: 98.1 °F (36.7 °C)   SpO2: 95%   Weight: 84.4 kg (186 lb)   Height: 5' 11\" (1.803 m)            Physical Exam   Constitutional: He is oriented to person, place, and time. He appears well-developed and well-nourished. No distress. HENT:   Head: Normocephalic and atraumatic. Nose: Nose normal.   Mouth/Throat: Oropharynx is clear and moist.   Eyes: Conjunctivae and EOM are normal. Pupils are equal, round, and reactive to light. No scleral icterus. Neck: Normal range of motion. Neck supple. No JVD present. No tracheal deviation present. No thyromegaly present. No carotid bruits noted. Cardiovascular: Normal rate, regular rhythm, normal heart sounds and intact distal pulses. Exam reveals no gallop and no friction rub. No murmur heard. Pulmonary/Chest: Effort normal and breath sounds normal. No respiratory distress. He has no wheezes. He has no rales. He exhibits no tenderness. Chest is clear; no wheezes, rhonchi, or rales; rattling cough. Abdominal: Soft. Bowel sounds are normal. He exhibits no distension and no mass. There is no tenderness. There is no rebound and no guarding. Abdomen is soft and non-tender. Musculoskeletal: Normal range of motion. He exhibits no edema or tenderness. No peripheral edema. Lymphadenopathy:     He has no cervical adenopathy. Neurological: He is alert and oriented to person, place, and time. He has normal reflexes. No cranial nerve deficit. Coordination normal.   Skin: Skin is warm and dry. No rash noted. No erythema. Psychiatric: He has a normal mood and affect. His behavior is normal. Judgment and thought content normal.   Nursing note and vitals reviewed.   Note written by Santa Monaco, as dictated by Nereida Guidry MD 2:54 PM      MDM  Number of Diagnoses or Management Options  Acute bronchitis, unspecified organism: new and requires workup Amount and/or Complexity of Data Reviewed  Clinical lab tests: ordered and reviewed  Tests in the radiology section of CPT®: ordered and reviewed  Decide to obtain previous medical records or to obtain history from someone other than the patient: yes  Review and summarize past medical records: yes  Independent visualization of images, tracings, or specimens: yes    Risk of Complications, Morbidity, and/or Mortality  Presenting problems: high  Diagnostic procedures: high  Management options: high    Patient Progress  Patient progress: stable        ED Course       Procedures  The patient is being discharged and treated for an acute bronchitis. CT scan was discussed with him and it was noted that he had a nodule in the upper lobe it is recommended that he followup with his own physician for further evaluation of the same.

## 2018-05-06 LAB
ATRIAL RATE: 80 BPM
CALCULATED P AXIS, ECG09: 74 DEGREES
CALCULATED R AXIS, ECG10: -36 DEGREES
CALCULATED T AXIS, ECG11: 48 DEGREES
DIAGNOSIS, 93000: NORMAL
P-R INTERVAL, ECG05: 144 MS
Q-T INTERVAL, ECG07: 380 MS
QRS DURATION, ECG06: 86 MS
QTC CALCULATION (BEZET), ECG08: 438 MS
VENTRICULAR RATE, ECG03: 80 BPM

## 2019-02-15 ENCOUNTER — HOSPITAL ENCOUNTER (EMERGENCY)
Age: 74
Discharge: HOME | End: 2019-02-16
Attending: EMERGENCY MEDICINE | Admitting: EMERGENCY MEDICINE
Payer: MEDICARE

## 2019-02-15 ENCOUNTER — APPOINTMENT (OUTPATIENT)
Dept: GENERAL RADIOLOGY | Age: 74
End: 2019-02-15
Attending: EMERGENCY MEDICINE
Payer: MEDICARE

## 2019-02-15 VITALS
DIASTOLIC BLOOD PRESSURE: 61 MMHG | HEART RATE: 97 BPM | RESPIRATION RATE: 26 BRPM | BODY MASS INDEX: 24.39 KG/M2 | WEIGHT: 174.2 LBS | OXYGEN SATURATION: 92 % | HEIGHT: 71 IN | TEMPERATURE: 99.4 F | SYSTOLIC BLOOD PRESSURE: 104 MMHG

## 2019-02-15 DIAGNOSIS — J20.9 ACUTE BRONCHITIS, UNSPECIFIED ORGANISM: Primary | ICD-10-CM

## 2019-02-15 LAB
ALBUMIN SERPL-MCNC: 3.8 G/DL (ref 3.5–5)
ALBUMIN/GLOB SERPL: 0.9 {RATIO} (ref 1.1–2.2)
ALP SERPL-CCNC: 65 U/L (ref 45–117)
ALT SERPL-CCNC: 20 U/L (ref 12–78)
ANION GAP SERPL CALC-SCNC: 7 MMOL/L (ref 5–15)
AST SERPL-CCNC: 25 U/L (ref 15–37)
BASOPHILS # BLD: 0.1 K/UL (ref 0–0.1)
BASOPHILS NFR BLD: 1 % (ref 0–1)
BILIRUB SERPL-MCNC: 0.3 MG/DL (ref 0.2–1)
BUN SERPL-MCNC: 20 MG/DL (ref 6–20)
BUN/CREAT SERPL: 15 (ref 12–20)
CALCIUM SERPL-MCNC: 9.6 MG/DL (ref 8.5–10.1)
CHLORIDE SERPL-SCNC: 103 MMOL/L (ref 97–108)
CO2 SERPL-SCNC: 28 MMOL/L (ref 21–32)
COMMENT, HOLDF: NORMAL
CREAT SERPL-MCNC: 1.31 MG/DL (ref 0.7–1.3)
DIFFERENTIAL METHOD BLD: ABNORMAL
EOSINOPHIL # BLD: 0.1 K/UL (ref 0–0.4)
EOSINOPHIL NFR BLD: 1 % (ref 0–7)
ERYTHROCYTE [DISTWIDTH] IN BLOOD BY AUTOMATED COUNT: 12.7 % (ref 11.5–14.5)
GLOBULIN SER CALC-MCNC: 4.1 G/DL (ref 2–4)
GLUCOSE SERPL-MCNC: 112 MG/DL (ref 65–100)
HCT VFR BLD AUTO: 50.2 % (ref 36.6–50.3)
HGB BLD-MCNC: 16.6 G/DL (ref 12.1–17)
IMM GRANULOCYTES # BLD AUTO: 0.1 K/UL (ref 0–0.04)
IMM GRANULOCYTES NFR BLD AUTO: 1 % (ref 0–0.5)
LYMPHOCYTES # BLD: 1.6 K/UL (ref 0.8–3.5)
LYMPHOCYTES NFR BLD: 14 % (ref 12–49)
MCH RBC QN AUTO: 31.9 PG (ref 26–34)
MCHC RBC AUTO-ENTMCNC: 33.1 G/DL (ref 30–36.5)
MCV RBC AUTO: 96.4 FL (ref 80–99)
MONOCYTES # BLD: 1.6 K/UL (ref 0–1)
MONOCYTES NFR BLD: 15 % (ref 5–13)
NEUTS SEG # BLD: 7.5 K/UL (ref 1.8–8)
NEUTS SEG NFR BLD: 69 % (ref 32–75)
NRBC # BLD: 0 K/UL (ref 0–0.01)
NRBC BLD-RTO: 0 PER 100 WBC
PLATELET # BLD AUTO: 155 K/UL (ref 150–400)
PMV BLD AUTO: 12 FL (ref 8.9–12.9)
POTASSIUM SERPL-SCNC: 4.6 MMOL/L (ref 3.5–5.1)
PROT SERPL-MCNC: 7.9 G/DL (ref 6.4–8.2)
RBC # BLD AUTO: 5.21 M/UL (ref 4.1–5.7)
SAMPLES BEING HELD,HOLD: NORMAL
SODIUM SERPL-SCNC: 138 MMOL/L (ref 136–145)
WBC # BLD AUTO: 10.8 K/UL (ref 4.1–11.1)

## 2019-02-15 PROCEDURE — 74011636637 HC RX REV CODE- 636/637: Performed by: EMERGENCY MEDICINE

## 2019-02-15 PROCEDURE — 94640 AIRWAY INHALATION TREATMENT: CPT

## 2019-02-15 PROCEDURE — 80053 COMPREHEN METABOLIC PANEL: CPT

## 2019-02-15 PROCEDURE — 99283 EMERGENCY DEPT VISIT LOW MDM: CPT

## 2019-02-15 PROCEDURE — A9270 NON-COVERED ITEM OR SERVICE: HCPCS | Performed by: EMERGENCY MEDICINE

## 2019-02-15 PROCEDURE — 77030029684 HC NEB SM VOL KT MONA -A

## 2019-02-15 PROCEDURE — 74011000250 HC RX REV CODE- 250: Performed by: EMERGENCY MEDICINE

## 2019-02-15 PROCEDURE — 36415 COLL VENOUS BLD VENIPUNCTURE: CPT

## 2019-02-15 PROCEDURE — 71046 X-RAY EXAM CHEST 2 VIEWS: CPT

## 2019-02-15 PROCEDURE — 85025 COMPLETE CBC W/AUTO DIFF WBC: CPT

## 2019-02-15 RX ORDER — ALBUTEROL SULFATE 90 UG/1
2 AEROSOL, METERED RESPIRATORY (INHALATION)
Qty: 1 INHALER | Refills: 0 | Status: SHIPPED | OUTPATIENT
Start: 2019-02-15

## 2019-02-15 RX ORDER — ALBUTEROL SULFATE 0.83 MG/ML
5 SOLUTION RESPIRATORY (INHALATION)
Status: COMPLETED | OUTPATIENT
Start: 2019-02-15 | End: 2019-02-15

## 2019-02-15 RX ORDER — PREDNISONE 10 MG/1
TABLET ORAL
Qty: 48 TAB | Refills: 0 | Status: SHIPPED | OUTPATIENT
Start: 2019-02-15

## 2019-02-15 RX ORDER — IPRATROPIUM BROMIDE AND ALBUTEROL SULFATE 2.5; .5 MG/3ML; MG/3ML
3 SOLUTION RESPIRATORY (INHALATION)
Status: COMPLETED | OUTPATIENT
Start: 2019-02-15 | End: 2019-02-15

## 2019-02-15 RX ADMIN — IPRATROPIUM BROMIDE AND ALBUTEROL SULFATE 3 ML: .5; 3 SOLUTION RESPIRATORY (INHALATION) at 21:21

## 2019-02-15 RX ADMIN — ALBUTEROL SULFATE 5 MG: 2.5 SOLUTION RESPIRATORY (INHALATION) at 21:48

## 2019-02-15 RX ADMIN — PREDNISONE 50 MG: 20 TABLET ORAL at 21:21

## 2019-02-15 NOTE — ED TRIAGE NOTES
Pt reports having cough, chest congestion, fever and generalized body aches since this past Monday. Pt has been taking Prednisone that he had left over but is not feeling better.

## 2019-02-16 NOTE — DISCHARGE INSTRUCTIONS
Patient Education        Bronchitis: Care Instructions  Your Care Instructions    Bronchitis is inflammation of the bronchial tubes, which carry air to the lungs. The tubes swell and produce mucus, or phlegm. The mucus and inflamed bronchial tubes make you cough. You may have trouble breathing. Most cases of bronchitis are caused by viruses like those that cause colds. Antibiotics usually do not help and they may be harmful. Bronchitis usually develops rapidly and lasts about 2 to 3 weeks in otherwise healthy people. Follow-up care is a key part of your treatment and safety. Be sure to make and go to all appointments, and call your doctor if you are having problems. It's also a good idea to know your test results and keep a list of the medicines you take. How can you care for yourself at home? · Take all medicines exactly as prescribed. Call your doctor if you think you are having a problem with your medicine. · Get some extra rest.  · Take an over-the-counter pain medicine, such as acetaminophen (Tylenol), ibuprofen (Advil, Motrin), or naproxen (Aleve) to reduce fever and relieve body aches. Read and follow all instructions on the label. · Do not take two or more pain medicines at the same time unless the doctor told you to. Many pain medicines have acetaminophen, which is Tylenol. Too much acetaminophen (Tylenol) can be harmful. · Take an over-the-counter cough medicine that contains dextromethorphan to help quiet a dry, hacking cough so that you can sleep. Avoid cough medicines that have more than one active ingredient. Read and follow all instructions on the label. · Breathe moist air from a humidifier, hot shower, or sink filled with hot water. The heat and moisture will thin mucus so you can cough it out. · Do not smoke. Smoking can make bronchitis worse. If you need help quitting, talk to your doctor about stop-smoking programs and medicines.  These can increase your chances of quitting for good.  When should you call for help? Call 911 anytime you think you may need emergency care. For example, call if:    · You have severe trouble breathing.    Call your doctor now or seek immediate medical care if:    · You have new or worse trouble breathing.     · You cough up dark brown or bloody mucus (sputum).     · You have a new or higher fever.     · You have a new rash.    Watch closely for changes in your health, and be sure to contact your doctor if:    · You cough more deeply or more often, especially if you notice more mucus or a change in the color of your mucus.     · You are not getting better as expected. Where can you learn more? Go to http://alice-yadira.info/. Enter H333 in the search box to learn more about \"Bronchitis: Care Instructions. \"  Current as of: September 5, 2018  Content Version: 11.9  © 5489-2419 [employer]. Care instructions adapted under license by [employer] (which disclaims liability or warranty for this information). If you have questions about a medical condition or this instruction, always ask your healthcare professional. [address] any warranty or liability for your use of this information.

## 2020-03-07 ENCOUNTER — APPOINTMENT (OUTPATIENT)
Dept: GENERAL RADIOLOGY | Age: 75
End: 2020-03-07
Attending: NURSE PRACTITIONER
Payer: MEDICARE

## 2020-03-07 ENCOUNTER — HOSPITAL ENCOUNTER (EMERGENCY)
Age: 75
Discharge: HOME | End: 2020-03-07
Attending: EMERGENCY MEDICINE
Payer: MEDICARE

## 2020-03-07 VITALS
DIASTOLIC BLOOD PRESSURE: 83 MMHG | WEIGHT: 183.86 LBS | SYSTOLIC BLOOD PRESSURE: 168 MMHG | BODY MASS INDEX: 25.74 KG/M2 | RESPIRATION RATE: 18 BRPM | HEART RATE: 80 BPM | HEIGHT: 71 IN | OXYGEN SATURATION: 98 % | TEMPERATURE: 98 F

## 2020-03-07 DIAGNOSIS — M70.22 OLECRANON BURSITIS OF LEFT ELBOW: Primary | ICD-10-CM

## 2020-03-07 PROCEDURE — 99284 EMERGENCY DEPT VISIT MOD MDM: CPT

## 2020-03-07 PROCEDURE — 74011000250 HC RX REV CODE- 250: Performed by: NURSE PRACTITIONER

## 2020-03-07 PROCEDURE — 73080 X-RAY EXAM OF ELBOW: CPT

## 2020-03-07 RX ORDER — DOXYCYCLINE HYCLATE 100 MG
100 TABLET ORAL 2 TIMES DAILY
Qty: 14 TAB | Refills: 0 | Status: SHIPPED | OUTPATIENT
Start: 2020-03-07 | End: 2020-03-14

## 2020-03-07 RX ORDER — PREDNISONE 20 MG/1
40 TABLET ORAL DAILY
Qty: 10 TAB | Refills: 0 | Status: SHIPPED | OUTPATIENT
Start: 2020-03-07 | End: 2020-03-12

## 2020-03-07 RX ORDER — LIDOCAINE HYDROCHLORIDE 10 MG/ML
5 INJECTION, SOLUTION EPIDURAL; INFILTRATION; INTRACAUDAL; PERINEURAL ONCE
Status: COMPLETED | OUTPATIENT
Start: 2020-03-07 | End: 2020-03-07

## 2020-03-07 RX ORDER — ACETAMINOPHEN 325 MG/1
650 TABLET ORAL
Qty: 20 TAB | Refills: 0 | Status: SHIPPED | OUTPATIENT
Start: 2020-03-07

## 2020-03-07 RX ADMIN — LIDOCAINE HYDROCHLORIDE 5 ML: 10 INJECTION, SOLUTION EPIDURAL; INFILTRATION; INTRACAUDAL; PERINEURAL at 13:12

## 2020-03-07 NOTE — ED NOTES
All discharge paperwork reviewed with patient, PA and RN and he denies any need for further explanation regarding these instructions.  Denies need for wheelchair and ambulates out of ED

## 2020-03-07 NOTE — DISCHARGE INSTRUCTIONS
Patient Education        Bursitis of the Elbow: Care Instructions  Your Care Instructions  Bursitis is pain and swelling of the bursae. These are sacs of fluid that help your joints move smoothly. Olecranon bursitis is a type of bursitis that affects the back of the elbow. This is sometimes called Yusuf elbow because the bump that develops looks like the cartoon character Yusuf's elbow. Injury, overuse, or prolonged pressure on your elbow can cause this form of bursitis. Sometimes it happens when people have arthritis. It also can occur for unknown reasons. Treatment may include draining fluid from the bursa with a needle. If your doctor thought there was infection, he or she may have prescribed antibiotics. You also may get shots of medicine into the bursa to help the swelling go down. Your elbow should get better in a few days or weeks. Follow-up care is a key part of your treatment and safety. Be sure to make and go to all appointments, and call your doctor if you are having problems. It's also a good idea to know your test results and keep a list of the medicines you take. How can you care for yourself at home? · Take pain medicines exactly as directed. ? If the doctor gave you a prescription medicine for pain, take it as prescribed. ? If you are not taking a prescription pain medicine, ask your doctor if you can take an over-the-counter medicine. ? Do not take two or more pain medicines at the same time unless the doctor told you to. Many pain medicines have acetaminophen, which is Tylenol. Too much acetaminophen (Tylenol) can be harmful. · If your doctor prescribed antibiotics, take them as directed. Do not stop taking them just because you feel better. You need to take the full course of antibiotics. · If your doctor gave you a sling, an elastic bandage, or a compression sleeve, wear it exactly as instructed. · Put ice or a cold pack on your elbow for 10 to 20 minutes at a time.  Try to do this every 1 to 2 hours for the next 3 days (when you are awake) or until the swelling goes down. Put a thin cloth between the ice and your skin. · After 3 days, you can try heat, or alternate heat and ice. · Rest your elbow. Try to stop or reduce any activity that causes pain. · Wear elbow pads during physical activity to prevent injury. · Do not lean your elbows on tables or armrests. When should you call for help? Call your doctor now or seek immediate medical care if:    · You have new or worse symptoms of infection, such as:  ? Increased pain, swelling, warmth, or redness. ? Red streaks leading from the area. ? Pus draining from the area. ? A fever.    Watch closely for changes in your health, and be sure to contact your doctor if:    · You do not get better as expected. Where can you learn more? Go to http://alice-yadira.info/. Enter  in the search box to learn more about \"Bursitis of the Elbow: Care Instructions. \"  Current as of: June 26, 2019  Content Version: 12.2  © 4268-3956 [employer]. Care instructions adapted under license by [employer] (which disclaims liability or warranty for this information). If you have questions about a medical condition or this instruction, always ask your healthcare professional. [address] any warranty or liability for your use of this information.

## 2020-03-08 NOTE — ED PROVIDER NOTES
EMERGENCY DEPARTMENT HISTORY AND PHYSICAL EXAM      Date: 3/7/2020  Patient Name: Genet Leal. History of Presenting Illness     Chief Complaint   Patient presents with    Elbow Pain     pt reports abscess and drainage to left elbow    Hand Swelling     pt reprots left hand pain and swelling       History Provided By: Patient    HPI: Genet Fuentes, 69 y.o. male presents by POV to the ED with cc of left hand pain along with swelling and drainage from the elbow. Patient states he noticed yesterday morning the left elbow was swollen and there was drainage once he pressed his elbow against the table. Patient denies pain, discomfort or decreased range of motion in the elbow. Patient states he did not notice until he noticed the drainage. Patient also states there is the strange sharp pain in his left hand even though he has not had an injury to that hand. Patient denies a crushing injury, hitting or punching any objects and states the pain is only intermittent. Patient denies fever, chills or any other symptoms at this time. There are no other complaints, changes, or physical findings at this time. PCP: Margarito Alaniz MD    No current facility-administered medications on file prior to encounter. Current Outpatient Medications on File Prior to Encounter   Medication Sig Dispense Refill    predniSONE (STERAPRED DS) 10 mg dose pack Take as directed for breathing 48 Tab 0    albuterol (VENTOLIN HFA) 90 mcg/actuation inhaler Take 2 Puffs by inhalation every four (4) hours as needed. 1 Inhaler 0    inhalational spacing device 1 Each by Does Not Apply route as needed. 1 Device 0    levoFLOXacin (LEVAQUIN) 750 mg tablet Take 1 Tab by mouth daily. 10 Tab 0    losartan (COZAAR) 50 mg tablet Take 50 mg by mouth daily.  aspirin delayed-release 81 mg tablet Take 81 mg by mouth daily.       umeclidinium-vilanterol (ANORO ELLIPTA) 62.5-25 mcg/actuation inhaler Take 1 Puff by inhalation daily. Pt states he uses only as needed      omeprazole (PRILOSEC) 40 mg capsule Take 40 mg by mouth daily as needed.  metoprolol (LOPRESSOR) 25 mg tablet Take 0.5 Tabs by mouth every twelve (12) hours. 60 Tab 1    pravastatin (PRAVACHOL) 20 mg tablet Take 1 Tab by mouth nightly. 30 Tab 1       Past History     Past Medical History:  Past Medical History:   Diagnosis Date    Aneurysm ([address]     abdominal    Arthritis     CAD (coronary artery disease)     heart attack    Heart failure (HCC)     CHF    Ill-defined condition 12/2017    nuclear stress test - led to catherization being done    Ill-defined condition     pre-COPD    Ill-defined condition     pre-diabetes       Past Surgical History:  Past Surgical History:   Procedure Laterality Date    CARDIAC SURG PROCEDURE UNLIST  4/5/13    CABG   [address], 2006, 2008    stents    COLONOSCOPY N/A 1/8/2018    COLONOSCOPY performed by Rahul Guzman.  Zurdo Lyons MD at [address] [place] [address] ENDOSCOPY    HX HEART CATHETERIZATION  12/2017    abdominal aneurysm    HX HERNIA REPAIR      HX ORTHOPAEDIC      neck    HX ORTHOPAEDIC      left knee arthroscopic surgery    HX ORTHOPAEDIC      right foot Pascal's neuroma    HX TONSILLECTOMY         Family History:  Family History   Problem Relation Age of Onset   Alon Thompson Arthritis-osteo Mother     Asthma Mother     Diabetes Mother     Elevated Lipids Mother     Heart Disease Mother     Hypertension Mother     Heart Disease Father     Arthritis-osteo Sister     Diabetes Sister     Elevated Lipids Sister     Heart Disease Maternal Grandmother     Heart Disease Maternal Grandfather     Stroke Maternal Grandfather     Heart Disease Paternal Grandmother     Heart Disease Paternal Grandfather        Social History:  Social History     Tobacco Use    Smoking status: Current Every Day Smoker     Packs/day: 1.50     Years: 40.00     Pack years: 60.00     Last attempt to quit: 4/1/2013     Years since quittin.9    Smokeless tobacco: Never Used    Tobacco comment: started smoking again   Substance Use Topics    Alcohol use: Yes     Comment: drinks everything, once a week on weekend - less than this per pt on 2017    Drug use: Not on file       Allergies: Allergies   Allergen Reactions    Atorvastatin Myalgia    Penicillin G Rash         Review of Systems   Review of Systems   Constitutional: Negative for activity change, chills and fever. HENT: Negative for congestion, rhinorrhea and sore throat. Respiratory: Negative for cough and shortness of breath. Cardiovascular: Negative for chest pain. Gastrointestinal: Negative for abdominal pain, constipation, diarrhea, nausea and vomiting. Endocrine: Negative for polyuria. Genitourinary: Negative for dysuria and frequency. Musculoskeletal: Positive for arthralgias and joint swelling. Negative for back pain. Neurological: Negative for dizziness and headaches. All other systems reviewed and are negative. Physical Exam   Physical Exam  Vitals signs and nursing note reviewed. Constitutional:       General: He is not in acute distress. Appearance: He is well-developed. He is not diaphoretic. Comments: 69 y.o. male   HENT:      Head: Normocephalic and atraumatic. Nose: Nose normal.      Mouth/Throat:      Mouth: Mucous membranes are moist.      Pharynx: Oropharynx is clear. Eyes:      Conjunctiva/sclera: Conjunctivae normal.      Pupils: Pupils are equal, round, and reactive to light. Neck:      Musculoskeletal: Normal range of motion. Cardiovascular:      Rate and Rhythm: Normal rate and regular rhythm. Heart sounds: Normal heart sounds. No murmur. Pulmonary:      Effort: Pulmonary effort is normal. No respiratory distress. Breath sounds: Normal breath sounds. No wheezing. Musculoskeletal: Normal range of motion. General: Swelling and tenderness present.       Left elbow: He exhibits swelling and effusion. He exhibits normal range of motion, no deformity and no laceration. Tenderness found. No radial head, no medial epicondyle, no lateral epicondyle and no olecranon process tenderness noted. Skin:     General: Skin is warm and dry. Capillary Refill: Capillary refill takes less than 2 seconds. Neurological:      General: No focal deficit present. Mental Status: He is alert and oriented to person, place, and time. Mental status is at baseline. Psychiatric:         Mood and Affect: Mood normal.         Behavior: Behavior normal.         Diagnostic Study Results     Labs -   No results found for this or any previous visit (from the past 12 hour(s)). Radiologic Studies -   XR ELBOW LT MIN 3 V   Final Result   IMPRESSION: Olecranon bursitis. Considerations include those etiologies of   infectious origin, trauma, and those related to crystal deposition disease. Medical Decision Making   I am the first provider for this patient. I reviewed the vital signs, available nursing notes, past medical history, past surgical history, family history and social history. Vital Signs-Reviewed the patient's vital signs. No data found. Records Reviewed: Nursing Notes, Old Medical Records, Previous Radiology Studies and Previous Laboratory Studies    Provider Notes (Medical Decision Making):   Differential diagnoses include elbow effusion, cellulitis, gouty arthritis. ED Course:   Initial assessment performed. The patients presenting problems have been discussed, and they are in agreement with the care plan formulated and outlined with them. I have encouraged them to ask questions as they arise throughout their visit. Discussed with patient that the x-ray of the left elbow shows all cranium bursitis. Patient was given prescription for doxycycline, prednisone and extra strength Tylenol to help alleviate swelling, drainage, and redness.   He was also given an Ace wrap for the elbow for comfort. Advised him to follow-up with his primary care provider and take all of the medication until complete. Come back to the emergency department if pain increases, decrease of range of motion, fever and chills, or increased redness or swelling of the elbow. Otherwise he may follow-up with his primary care provider in 3 to 5 days. Critical Care Time: None    Disposition:  DISCHARGE NOTE:  2:19 PM  The pt is ready for discharge. The pt's signs, symptoms, diagnosis, and discharge instructions have been discussed and pt has conveyed their understanding. The pt is to follow up as recommended or return to ER should their symptoms worsen. Plan has been discussed and pt is in agreement. Valerie Mcdaniel NP  03/07/2020    PLAN:  1. Discharge Medication List as of 3/7/2020  2:19 PM      START taking these medications    Details   doxycycline (VIBRA-TABS) 100 mg tablet Take 1 Tab by mouth two (2) times a day for 7 days. , Normal, Disp-14 Tab, R-0      predniSONE (DELTASONE) 20 mg tablet Take 40 mg by mouth daily for 5 days. With Breakfast, Normal, Disp-10 Tab, R-0      acetaminophen (TYLENOL) 325 mg tablet Take 2 Tabs by mouth every four (4) hours as needed for Pain., Normal, Disp-20 Tab, R-0         CONTINUE these medications which have NOT CHANGED    Details   predniSONE (STERAPRED DS) 10 mg dose pack Take as directed for breathing, Print, Disp-48 Tab, R-0      albuterol (VENTOLIN HFA) 90 mcg/actuation inhaler Take 2 Puffs by inhalation every four (4) hours as needed. , Print, Disp-1 Inhaler, R-0      inhalational spacing device 1 Each by Does Not Apply route as needed. , Print, Disp-1 Device, R-0      levoFLOXacin (LEVAQUIN) 750 mg tablet Take 1 Tab by mouth daily. , Print, Disp-10 Tab, R-0      losartan (COZAAR) 50 mg tablet Take 50 mg by mouth daily. , Historical Med      aspirin delayed-release 81 mg tablet Take 81 mg by mouth daily. , Historical Med      umeclidinium-vilanterol (ANORO ELLIPTA) 62.5-25 mcg/actuation inhaler Take 1 Puff by inhalation daily. Pt states he uses only as needed, Historical Med      omeprazole (PRILOSEC) 40 mg capsule Take 40 mg by mouth daily as needed., Historical Med      metoprolol (LOPRESSOR) 25 mg tablet Take 0.5 Tabs by mouth every twelve (12) hours. Print, 12.5 mg, Disp-60 Tab, R-1      pravastatin (PRAVACHOL) 20 mg tablet Take 1 Tab by mouth nightly. Print, 20 mg, Disp-30 Tab, R-1           2. Follow-up Information     Follow up With Specialties Details Why Contact Info    [facility] EMERGENCY DEPT Emergency Medicine Go in 1 week As needed, If symptoms worsen [address]    Nora Gonzalez MD Family Practice Schedule an appointment as soon as possible for a visit in 3 days As needed, If symptoms worsen [address]  [phone]          Return to ED if worse     Diagnosis     Clinical Impression:   1. Olecranon bursitis of left elbow          Please note that this dictation was completed with [employer], the computer voice recognition software. Quite often unanticipated grammatical, syntax, homophones, and other interpretive errors are inadvertently transcribed by the computer software. Please disregards these errors. Please excuse any errors that have escaped final proofreading. This note will not be viewable in [address]

## 2022-09-27 ENCOUNTER — TRANSCRIBE ORDER (OUTPATIENT)
Dept: SCHEDULING | Age: 77
End: 2022-09-27

## 2022-09-27 DIAGNOSIS — Z98.890 HISTORY OF ABDOMINAL AORTIC ANEURYSM (AAA) REPAIR: ICD-10-CM

## 2022-09-27 DIAGNOSIS — R11.2 NAUSEA AND VOMITING: ICD-10-CM

## 2022-09-27 DIAGNOSIS — R10.811 RUQ ABDOMINAL TENDERNESS: ICD-10-CM

## 2022-09-27 DIAGNOSIS — R63.4 UNINTENDED WEIGHT LOSS: ICD-10-CM

## 2022-09-27 DIAGNOSIS — R09.89 GLOBUS SENSATION: Primary | ICD-10-CM

## 2022-10-03 ENCOUNTER — HOSPITAL ENCOUNTER (OUTPATIENT)
Dept: NUCLEAR MEDICINE | Age: 77
Discharge: HOME | End: 2022-10-03
Attending: INTERNAL MEDICINE
Payer: MEDICARE

## 2022-10-03 DIAGNOSIS — R09.89 GLOBUS SENSATION: ICD-10-CM

## 2022-10-03 DIAGNOSIS — R10.811 RUQ ABDOMINAL TENDERNESS: ICD-10-CM

## 2022-10-03 DIAGNOSIS — Z98.890 HISTORY OF ABDOMINAL AORTIC ANEURYSM (AAA) REPAIR: ICD-10-CM

## 2022-10-03 DIAGNOSIS — R63.4 UNINTENDED WEIGHT LOSS: ICD-10-CM

## 2022-10-03 DIAGNOSIS — R11.2 NAUSEA AND VOMITING: ICD-10-CM

## 2022-10-03 PROCEDURE — 78226 HEPATOBILIARY SYSTEM IMAGING: CPT

## 2022-10-03 RX ORDER — KIT FOR THE PREPARATION OF TECHNETIUM TC 99M MEBROFENIN 45 MG/10ML
5.3 INJECTION, POWDER, LYOPHILIZED, FOR SOLUTION INTRAVENOUS
Status: COMPLETED | OUTPATIENT
Start: 2022-10-03 | End: 2022-10-03

## 2022-10-03 RX ADMIN — KIT FOR THE PREPARATION OF TECHNETIUM TC 99M MEBROFENIN 5.3 MILLICURIE: 45 INJECTION, POWDER, LYOPHILIZED, FOR SOLUTION INTRAVENOUS at 11:35

## 2024-02-08 ENCOUNTER — HOSPITAL ENCOUNTER (INPATIENT)
Facility: HOSPITAL | Age: 79
LOS: 12 days | Discharge: HOME HEALTH | DRG: 277 | End: 2024-02-20
Attending: STUDENT IN AN ORGANIZED HEALTH CARE EDUCATION/TRAINING PROGRAM | Admitting: STUDENT IN AN ORGANIZED HEALTH CARE EDUCATION/TRAINING PROGRAM
Payer: MEDICARE

## 2024-02-08 ENCOUNTER — APPOINTMENT (OUTPATIENT)
Facility: HOSPITAL | Age: 79
DRG: 277 | End: 2024-02-08
Payer: MEDICARE

## 2024-02-08 DIAGNOSIS — R79.89 ELEVATED SERUM CREATININE: ICD-10-CM

## 2024-02-08 DIAGNOSIS — R07.9 CHEST PAIN: ICD-10-CM

## 2024-02-08 DIAGNOSIS — R73.9 HYPERGLYCEMIA: ICD-10-CM

## 2024-02-08 DIAGNOSIS — I42.9 CARDIOMYOPATHY (HCC): ICD-10-CM

## 2024-02-08 DIAGNOSIS — R55 SYNCOPE AND COLLAPSE: Primary | ICD-10-CM

## 2024-02-08 LAB
ALBUMIN SERPL-MCNC: 3.5 G/DL (ref 3.5–5)
ALBUMIN/GLOB SERPL: 1 (ref 1.1–2.2)
ALP SERPL-CCNC: 73 U/L (ref 45–117)
ALT SERPL-CCNC: 16 U/L (ref 12–78)
ANION GAP SERPL CALC-SCNC: 4 MMOL/L (ref 5–15)
AST SERPL-CCNC: 10 U/L (ref 15–37)
BASOPHILS # BLD: 0 K/UL (ref 0–0.1)
BASOPHILS NFR BLD: 0 % (ref 0–1)
BILIRUB SERPL-MCNC: 0.5 MG/DL (ref 0.2–1)
BUN SERPL-MCNC: 30 MG/DL (ref 6–20)
BUN/CREAT SERPL: 20 (ref 12–20)
CALCIUM SERPL-MCNC: 9 MG/DL (ref 8.5–10.1)
CHLORIDE SERPL-SCNC: 106 MMOL/L (ref 97–108)
CO2 SERPL-SCNC: 30 MMOL/L (ref 21–32)
CREAT SERPL-MCNC: 1.51 MG/DL (ref 0.7–1.3)
DIFFERENTIAL METHOD BLD: ABNORMAL
EOSINOPHIL # BLD: 0.2 K/UL (ref 0–0.4)
EOSINOPHIL NFR BLD: 1 % (ref 0–7)
ERYTHROCYTE [DISTWIDTH] IN BLOOD BY AUTOMATED COUNT: 12.4 % (ref 11.5–14.5)
GLOBULIN SER CALC-MCNC: 3.5 G/DL (ref 2–4)
GLUCOSE SERPL-MCNC: 161 MG/DL (ref 65–100)
HCT VFR BLD AUTO: 43.6 % (ref 36.6–50.3)
HGB BLD-MCNC: 14.3 G/DL (ref 12.1–17)
IMM GRANULOCYTES # BLD AUTO: 0 K/UL (ref 0–0.04)
IMM GRANULOCYTES NFR BLD AUTO: 0 % (ref 0–0.5)
LYMPHOCYTES # BLD: 1.2 K/UL (ref 0.8–3.5)
LYMPHOCYTES NFR BLD: 8 % (ref 12–49)
MCH RBC QN AUTO: 31.6 PG (ref 26–34)
MCHC RBC AUTO-ENTMCNC: 32.8 G/DL (ref 30–36.5)
MCV RBC AUTO: 96.5 FL (ref 80–99)
MONOCYTES # BLD: 0.3 K/UL (ref 0–1)
MONOCYTES NFR BLD: 2 % (ref 5–13)
NEUTS BAND NFR BLD MANUAL: 1 %
NEUTS SEG # BLD: 13.9 K/UL (ref 1.8–8)
NEUTS SEG NFR BLD: 88 % (ref 32–75)
NT PRO BNP: 814 PG/ML
PLATELET # BLD AUTO: 189 K/UL (ref 150–400)
PMV BLD AUTO: 12.3 FL (ref 8.9–12.9)
POTASSIUM SERPL-SCNC: 4.6 MMOL/L (ref 3.5–5.1)
PROT SERPL-MCNC: 7 G/DL (ref 6.4–8.2)
RBC # BLD AUTO: 4.52 M/UL (ref 4.1–5.7)
RBC MORPH BLD: ABNORMAL
SODIUM SERPL-SCNC: 140 MMOL/L (ref 136–145)
TROPONIN I SERPL HS-MCNC: 37 NG/L (ref 0–76)
WBC # BLD AUTO: 15.6 K/UL (ref 4.1–11.1)

## 2024-02-08 PROCEDURE — 93005 ELECTROCARDIOGRAM TRACING: CPT | Performed by: STUDENT IN AN ORGANIZED HEALTH CARE EDUCATION/TRAINING PROGRAM

## 2024-02-08 PROCEDURE — 83880 ASSAY OF NATRIURETIC PEPTIDE: CPT

## 2024-02-08 PROCEDURE — 99285 EMERGENCY DEPT VISIT HI MDM: CPT

## 2024-02-08 PROCEDURE — 84484 ASSAY OF TROPONIN QUANT: CPT

## 2024-02-08 PROCEDURE — 85025 COMPLETE CBC W/AUTO DIFF WBC: CPT

## 2024-02-08 PROCEDURE — 36415 COLL VENOUS BLD VENIPUNCTURE: CPT

## 2024-02-08 PROCEDURE — 80053 COMPREHEN METABOLIC PANEL: CPT

## 2024-02-08 PROCEDURE — 94760 N-INVAS EAR/PLS OXIMETRY 1: CPT

## 2024-02-08 PROCEDURE — 71045 X-RAY EXAM CHEST 1 VIEW: CPT

## 2024-02-08 PROCEDURE — 1100000000 HC RM PRIVATE

## 2024-02-08 RX ORDER — SODIUM CHLORIDE 0.9 % (FLUSH) 0.9 %
5-40 SYRINGE (ML) INJECTION EVERY 12 HOURS SCHEDULED
Status: DISCONTINUED | OUTPATIENT
Start: 2024-02-08 | End: 2024-02-17

## 2024-02-08 RX ORDER — ACETAMINOPHEN 650 MG/1
650 SUPPOSITORY RECTAL EVERY 6 HOURS PRN
Status: DISCONTINUED | OUTPATIENT
Start: 2024-02-08 | End: 2024-02-20 | Stop reason: HOSPADM

## 2024-02-08 RX ORDER — SODIUM CHLORIDE 9 MG/ML
INJECTION, SOLUTION INTRAVENOUS PRN
Status: DISCONTINUED | OUTPATIENT
Start: 2024-02-08 | End: 2024-02-20 | Stop reason: HOSPADM

## 2024-02-08 RX ORDER — PAROXETINE HYDROCHLORIDE 20 MG/1
20 TABLET, FILM COATED ORAL EVERY MORNING
Status: DISCONTINUED | OUTPATIENT
Start: 2024-02-09 | End: 2024-02-20 | Stop reason: HOSPADM

## 2024-02-08 RX ORDER — ONDANSETRON 4 MG/1
4 TABLET, ORALLY DISINTEGRATING ORAL EVERY 8 HOURS PRN
Status: DISCONTINUED | OUTPATIENT
Start: 2024-02-08 | End: 2024-02-20 | Stop reason: HOSPADM

## 2024-02-08 RX ORDER — ONDANSETRON 2 MG/ML
4 INJECTION INTRAMUSCULAR; INTRAVENOUS EVERY 6 HOURS PRN
Status: DISCONTINUED | OUTPATIENT
Start: 2024-02-08 | End: 2024-02-20 | Stop reason: HOSPADM

## 2024-02-08 RX ORDER — SODIUM CHLORIDE 0.9 % (FLUSH) 0.9 %
5-40 SYRINGE (ML) INJECTION PRN
Status: DISCONTINUED | OUTPATIENT
Start: 2024-02-08 | End: 2024-02-17

## 2024-02-08 RX ORDER — LOSARTAN POTASSIUM 25 MG/1
50 TABLET ORAL DAILY
Status: DISCONTINUED | OUTPATIENT
Start: 2024-02-09 | End: 2024-02-11

## 2024-02-08 RX ORDER — POLYETHYLENE GLYCOL 3350 17 G/17G
17 POWDER, FOR SOLUTION ORAL DAILY PRN
Status: DISCONTINUED | OUTPATIENT
Start: 2024-02-08 | End: 2024-02-20 | Stop reason: HOSPADM

## 2024-02-08 RX ORDER — ASPIRIN 81 MG/1
81 TABLET ORAL DAILY
Status: DISCONTINUED | OUTPATIENT
Start: 2024-02-09 | End: 2024-02-20 | Stop reason: HOSPADM

## 2024-02-08 RX ORDER — ACETAMINOPHEN 325 MG/1
650 TABLET ORAL EVERY 6 HOURS PRN
Status: DISCONTINUED | OUTPATIENT
Start: 2024-02-08 | End: 2024-02-14 | Stop reason: SDUPTHER

## 2024-02-08 RX ORDER — ALBUTEROL SULFATE 2.5 MG/3ML
2.5 SOLUTION RESPIRATORY (INHALATION) EVERY 4 HOURS PRN
Status: DISCONTINUED | OUTPATIENT
Start: 2024-02-08 | End: 2024-02-20 | Stop reason: HOSPADM

## 2024-02-08 RX ORDER — ENOXAPARIN SODIUM 100 MG/ML
40 INJECTION SUBCUTANEOUS DAILY
Status: DISCONTINUED | OUTPATIENT
Start: 2024-02-09 | End: 2024-02-08

## 2024-02-08 RX ORDER — PAROXETINE HYDROCHLORIDE 20 MG/1
20 TABLET, FILM COATED ORAL EVERY MORNING
COMMUNITY

## 2024-02-08 RX ORDER — ARFORMOTEROL TARTRATE 15 UG/2ML
15 SOLUTION RESPIRATORY (INHALATION)
Status: DISCONTINUED | OUTPATIENT
Start: 2024-02-08 | End: 2024-02-09

## 2024-02-08 ASSESSMENT — LIFESTYLE VARIABLES
HOW OFTEN DO YOU HAVE A DRINK CONTAINING ALCOHOL: 2-4 TIMES A MONTH
HOW MANY STANDARD DRINKS CONTAINING ALCOHOL DO YOU HAVE ON A TYPICAL DAY: 1 OR 2

## 2024-02-08 ASSESSMENT — PAIN SCALES - GENERAL: PAINLEVEL_OUTOF10: 0

## 2024-02-08 NOTE — ED PROVIDER NOTES
and Pertinent negatives as per HPI.    PAST HISTORY     Past Medical History:  Past Medical History:   Diagnosis Date    Aneurysm (HCC)     abdominal    Arthritis     CAD (coronary artery disease)     heart attack    Heart failure (HCC)     CHF    Ill-defined condition 12/2017    nuclear stress test - led to catherization being done    Ill-defined condition     pre-COPD    Ill-defined condition     pre-diabetes       Past Surgical History:  Past Surgical History:   Procedure Laterality Date    CARDIAC CATHETERIZATION  12/2017    abdominal aneurysm    COLONOSCOPY N/A 1/8/2018    COLONOSCOPY performed by Ari Alexis MD at [facility] ENDOSCOPY    HERNIA REPAIR      ORTHOPEDIC SURGERY      right foot Knox's neuroma    ORTHOPEDIC SURGERY      neck    ORTHOPEDIC SURGERY      left knee arthroscopic surgery    [place] CARDIAC SUR[place] PROCEDURE UNLIST  4/5/13    CABG    [place] CARDIAC SUR[place] PROCEDURE UNLIST  1992, 2006, 2008    stents    TONSILLECTOMY         Family History:  Family History   Problem Relation Age of Onset    Stroke Maternal Grandfather     Heart Disease Mother     Elevated Lipids Mother     Hypertension Mother     Heart Disease Paternal Grandmother     Diabetes Mother     Asthma Mother     Osteoarthritis Mother     Heart Disease Paternal Grandfather     Heart Disease Maternal Grandmother     Heart Disease Father     Osteoarthritis Sister     Heart Disease Maternal Grandfather     Elevated Lipids Sister     Diabetes Sister        Social History:  Social History     Tobacco Use    Smoking status: Every Day     Current packs/day: 0.00     Types: Cigarettes     Last attempt to quit: 4/1/2013     Years since quitting: 10.8    Smokeless tobacco: Never    Tobacco comments:     Quit smoking: started smoking again   Substance Use Topics    Alcohol use: Yes       Allergies:  Allergies   Allergen Reactions    Atorvastatin Other (See Comments)    Penicillin G Rash       CURRENT MEDICATIONS      Previous Medications

## 2024-02-09 ENCOUNTER — APPOINTMENT (OUTPATIENT)
Facility: HOSPITAL | Age: 79
DRG: 277 | End: 2024-02-09
Attending: STUDENT IN AN ORGANIZED HEALTH CARE EDUCATION/TRAINING PROGRAM
Payer: MEDICARE

## 2024-02-09 PROBLEM — I65.23 BILATERAL CAROTID ARTERY STENOSIS: Status: ACTIVE | Noted: 2024-02-09

## 2024-02-09 LAB
ANION GAP SERPL CALC-SCNC: 3 MMOL/L (ref 5–15)
APPEARANCE UR: CLEAR
BACTERIA URNS QL MICRO: NEGATIVE /HPF
BASOPHILS # BLD: 0.1 K/UL (ref 0–0.1)
BASOPHILS NFR BLD: 1 % (ref 0–1)
BILIRUB UR QL: NEGATIVE
BUN SERPL-MCNC: 28 MG/DL (ref 6–20)
BUN/CREAT SERPL: 19 (ref 12–20)
CALCIUM SERPL-MCNC: 9.1 MG/DL (ref 8.5–10.1)
CHLORIDE SERPL-SCNC: 107 MMOL/L (ref 97–108)
CO2 SERPL-SCNC: 29 MMOL/L (ref 21–32)
COLOR UR: ABNORMAL
CREAT SERPL-MCNC: 1.45 MG/DL (ref 0.7–1.3)
DIFFERENTIAL METHOD BLD: ABNORMAL
ECHO BSA: 1.89 M2
EKG ATRIAL RATE: 60 BPM
EKG DIAGNOSIS: NORMAL
EKG P AXIS: 75 DEGREES
EKG P-R INTERVAL: 160 MS
EKG Q-T INTERVAL: 412 MS
EKG QRS DURATION: 96 MS
EKG QTC CALCULATION (BAZETT): 412 MS
EKG R AXIS: -25 DEGREES
EKG T AXIS: 133 DEGREES
EKG VENTRICULAR RATE: 60 BPM
EOSINOPHIL # BLD: 0.2 K/UL (ref 0–0.4)
EOSINOPHIL NFR BLD: 1 % (ref 0–7)
EPITH CASTS URNS QL MICRO: ABNORMAL /LPF
ERYTHROCYTE [DISTWIDTH] IN BLOOD BY AUTOMATED COUNT: 12.4 % (ref 11.5–14.5)
GLUCOSE SERPL-MCNC: 98 MG/DL (ref 65–100)
GLUCOSE UR STRIP.AUTO-MCNC: NEGATIVE MG/DL
HCT VFR BLD AUTO: 46.7 % (ref 36.6–50.3)
HGB BLD-MCNC: 14.8 G/DL (ref 12.1–17)
HGB UR QL STRIP: ABNORMAL
IMM GRANULOCYTES # BLD AUTO: 0.1 K/UL (ref 0–0.04)
IMM GRANULOCYTES NFR BLD AUTO: 0 % (ref 0–0.5)
KETONES UR QL STRIP.AUTO: NEGATIVE MG/DL
LEUKOCYTE ESTERASE UR QL STRIP.AUTO: NEGATIVE
LYMPHOCYTES # BLD: 1.9 K/UL (ref 0.8–3.5)
LYMPHOCYTES NFR BLD: 17 % (ref 12–49)
MCH RBC QN AUTO: 31.7 PG (ref 26–34)
MCHC RBC AUTO-ENTMCNC: 31.7 G/DL (ref 30–36.5)
MCV RBC AUTO: 100 FL (ref 80–99)
MONOCYTES # BLD: 0.8 K/UL (ref 0–1)
MONOCYTES NFR BLD: 7 % (ref 5–13)
NEUTS SEG # BLD: 8.5 K/UL (ref 1.8–8)
NEUTS SEG NFR BLD: 74 % (ref 32–75)
NITRITE UR QL STRIP.AUTO: NEGATIVE
NRBC # BLD: 0 K/UL (ref 0–0.01)
NRBC BLD-RTO: 0 PER 100 WBC
PH UR STRIP: 5.5 (ref 5–8)
PLATELET # BLD AUTO: 171 K/UL (ref 150–400)
PMV BLD AUTO: 12.2 FL (ref 8.9–12.9)
POTASSIUM SERPL-SCNC: 4.1 MMOL/L (ref 3.5–5.1)
PROT UR STRIP-MCNC: ABNORMAL MG/DL
RBC # BLD AUTO: 4.67 M/UL (ref 4.1–5.7)
RBC #/AREA URNS HPF: ABNORMAL /HPF (ref 0–5)
SODIUM SERPL-SCNC: 139 MMOL/L (ref 136–145)
SP GR UR REFRACTOMETRY: 1.02
URINE CULTURE IF INDICATED: ABNORMAL
UROBILINOGEN UR QL STRIP.AUTO: 1 EU/DL (ref 0.2–1)
VAS LEFT CCA DIST EDV: 11.6 CM/S
VAS LEFT CCA DIST PSV: 86.5 CM/S
VAS LEFT CCA PROX EDV: 9.1 CM/S
VAS LEFT CCA PROX PSV: 83.4 CM/S
VAS LEFT ECA EDV: 6.16 CM/S
VAS LEFT ECA PSV: 120.8 CM/S
VAS LEFT ICA DIST EDV: 8.9 CM/S
VAS LEFT ICA DIST PSV: 65.9 CM/S
VAS LEFT ICA MID EDV: 5.9 CM/S
VAS LEFT ICA MID PSV: 62.9 CM/S
VAS LEFT ICA PROX EDV: 8.9 CM/S
VAS LEFT ICA PROX PSV: 58.4 CM/S
VAS LEFT ICA/CCA PSV: 0.76 NO UNITS
VAS LEFT SUBCLAVIAN PROX EDV: 0 CM/S
VAS LEFT SUBCLAVIAN PROX PSV: 132.2 CM/S
VAS LEFT VERTEBRAL EDV: 13.6 CM/S
VAS LEFT VERTEBRAL PSV: 66.8 CM/S
VAS RIGHT CCA DIST EDV: 8.3 CM/S
VAS RIGHT CCA DIST PSV: 53 CM/S
VAS RIGHT CCA PROX EDV: 7.3 CM/S
VAS RIGHT CCA PROX PSV: 42.9 CM/S
VAS RIGHT ECA EDV: 0 CM/S
VAS RIGHT ECA PSV: 164.8 CM/S
VAS RIGHT ICA DIST EDV: 6.3 CM/S
VAS RIGHT ICA DIST PSV: 43.4 CM/S
VAS RIGHT ICA MID EDV: 9.5 CM/S
VAS RIGHT ICA MID PSV: 58 CM/S
VAS RIGHT ICA PROX EDV: 9.5 CM/S
VAS RIGHT ICA PROX PSV: 104.8 CM/S
VAS RIGHT ICA/CCA PSV: 2 NO UNITS
VAS RIGHT SUBCLAVIAN PROX EDV: 0 CM/S
VAS RIGHT SUBCLAVIAN PROX PSV: 263.6 CM/S
VAS RIGHT VERTEBRAL EDV: 6.3 CM/S
VAS RIGHT VERTEBRAL PSV: 51.5 CM/S
WBC # BLD AUTO: 11.5 K/UL (ref 4.1–11.1)
WBC URNS QL MICRO: ABNORMAL /HPF (ref 0–4)

## 2024-02-09 PROCEDURE — 95816 EEG AWAKE AND DROWSY: CPT

## 2024-02-09 PROCEDURE — 36415 COLL VENOUS BLD VENIPUNCTURE: CPT

## 2024-02-09 PROCEDURE — 2580000003 HC RX 258: Performed by: STUDENT IN AN ORGANIZED HEALTH CARE EDUCATION/TRAINING PROGRAM

## 2024-02-09 PROCEDURE — 94761 N-INVAS EAR/PLS OXIMETRY MLT: CPT

## 2024-02-09 PROCEDURE — 93880 EXTRACRANIAL BILAT STUDY: CPT | Performed by: PSYCHIATRY & NEUROLOGY

## 2024-02-09 PROCEDURE — 94640 AIRWAY INHALATION TREATMENT: CPT

## 2024-02-09 PROCEDURE — 6360000002 HC RX W HCPCS: Performed by: STUDENT IN AN ORGANIZED HEALTH CARE EDUCATION/TRAINING PROGRAM

## 2024-02-09 PROCEDURE — 85025 COMPLETE CBC W/AUTO DIFF WBC: CPT

## 2024-02-09 PROCEDURE — 95816 EEG AWAKE AND DROWSY: CPT | Performed by: INTERNAL MEDICINE

## 2024-02-09 PROCEDURE — 81001 URINALYSIS AUTO W/SCOPE: CPT

## 2024-02-09 PROCEDURE — 93880 EXTRACRANIAL BILAT STUDY: CPT

## 2024-02-09 PROCEDURE — 1100000003 HC PRIVATE W/ TELEMETRY

## 2024-02-09 PROCEDURE — 80048 BASIC METABOLIC PNL TOTAL CA: CPT

## 2024-02-09 PROCEDURE — 6370000000 HC RX 637 (ALT 250 FOR IP): Performed by: STUDENT IN AN ORGANIZED HEALTH CARE EDUCATION/TRAINING PROGRAM

## 2024-02-09 RX ORDER — ARFORMOTEROL TARTRATE 15 UG/2ML
15 SOLUTION RESPIRATORY (INHALATION)
Status: DISCONTINUED | OUTPATIENT
Start: 2024-02-09 | End: 2024-02-18

## 2024-02-09 RX ADMIN — SODIUM CHLORIDE, PRESERVATIVE FREE 10 ML: 5 INJECTION INTRAVENOUS at 00:33

## 2024-02-09 RX ADMIN — METOPROLOL TARTRATE 12.5 MG: 25 TABLET, FILM COATED ORAL at 12:23

## 2024-02-09 RX ADMIN — ONDANSETRON 4 MG: 2 INJECTION INTRAMUSCULAR; INTRAVENOUS at 21:56

## 2024-02-09 RX ADMIN — ARFORMOTEROL TARTRATE 15 MCG: 15 SOLUTION RESPIRATORY (INHALATION) at 00:40

## 2024-02-09 RX ADMIN — LOSARTAN POTASSIUM 50 MG: 25 TABLET, FILM COATED ORAL at 09:31

## 2024-02-09 RX ADMIN — IPRATROPIUM BROMIDE 0.5 MG: 0.5 SOLUTION RESPIRATORY (INHALATION) at 00:40

## 2024-02-09 RX ADMIN — PAROXETINE HYDROCHLORIDE 20 MG: 20 TABLET, FILM COATED ORAL at 09:31

## 2024-02-09 RX ADMIN — SODIUM CHLORIDE, PRESERVATIVE FREE 10 ML: 5 INJECTION INTRAVENOUS at 09:31

## 2024-02-09 RX ADMIN — ASPIRIN 81 MG: 81 TABLET, COATED ORAL at 09:31

## 2024-02-09 ASSESSMENT — PAIN SCALES - GENERAL: PAINLEVEL_OUTOF10: 0

## 2024-02-09 NOTE — H&P
Hospitalist Admission Note    NAME:   Anshu Bonilla Jr.   : 1945   MRN: [id number]     Date/Time: 2024 11:31 PM    Patient PCP: George Carr MD    ______________________________________________________________________  Given the patient's current clinical presentation, I have a high level of concern for decompensation if discharged from the emergency department.  Complex decision making was performed, which includes reviewing the patient's available past medical records, laboratory results, and x-ray films.       My assessment of this patient's clinical condition and my plan of care is as follows.    Assessment / Plan:    Syncope:  Rule out cardiogenic syncope:  Rule out neurogenic syncope:  -Ordered echocardiogram and ultrasound carotids.  -Ordered MRI brain without contrast and EEG.    Leukocytosis:  -No suspicion for sepsis.  -Likely secondary to steroids.    Acute kidney injury:  -Creatinine elevated to 1.59 with baseline approximately 1.0.  -Avoid nephrotoxic agents.  -Will continue to monitor.    COPD not in exacerbation:  -Will resume prior to admission inhalers on discharge.    Essential hypertension:  -Resume prior to admission medication losartan and metoprolol.    Medical Decision Making:   I personally reviewed labs: cbc, bmp  I personally reviewed imaging:CXR  I personally reviewed EKG:  Toxic drug monitoring: none  Discussed case with: ED provider. After discussion I am in agreement that acuity of patient's medical condition necessitates hospital stay.      Code Status: Full  DVT Prophylaxis: Not needed  Baseline:     Subjective:   CHIEF COMPLAINT: loss of consciousness    HISTORY OF PRESENT ILLNESS:     He is a 78-year-old gentleman with past medical history significant for essential hypertension, hyperlipidemia, COPD, tobacco abuse was brought to the hospital after he experienced an episode of loss of consciousness.  According to the roommate/caregiver, patient experienced

## 2024-02-09 NOTE — ED NOTES
This RN accepted a phone call from the patient's medical POA, roommate, and primary caregiver Fransisco Flores: [phone].     Notes from him:   - Unable to complete ADLs, including refusal to get out of bed, refusal to accept support services.   - Scheduled for \"some kind of scan for the heart\" for 2.14.  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   - \"Smokes 2-3 packs a day.\"  - \"I can't take care of him anymore at home... he's able, but he needs something that I can't give him.\"  - Events tonight: \"We were talking to him, he was alright and smoking a cigarette, sitting in a chair, and all of a sudden he started shaking like a seizure and went limp. He just became unresponsive. He looked like he was dead. He was out probably 10 minutes.\"  - Baseline mentation: \"He's confused sometimes, but I think he's sleeping too much and that's why.\"

## 2024-02-09 NOTE — PROCEDURES
ECG.   The data is stored digitally and reviewed in reformatted montages for optimal display.  EEG was reviewed in both bipolar and referential montages.    Description of Activity:  The background of this recording contains mixed frequencies in the delta to theta range. The posterior dominant rhythm reaches 5 hz and is seen symmetrically in both hemispheres.  Amplitudes are symmetric in both hemispheres with normal voltages. There is variability, continuity, reactivity present.  The anterior to posterior gradient is absent.    Throughout the recording, there were no clear areas of focal slowing nor spike or spike-and-wave discharges seen.     Hyperventilation was not performed. Photic stimulation produced a photomyogenic response at all frequencies.     During drowsiness, there is attenuation of the posterior dominant rhythm, roving horizontal eye movements and slower frequencies.    During the recording, the patient did not achieve stage II sleep.    There are no epileptiform discharges, electrographic seizures or clinical events of concern.     Single channel EKG shows an irregular heart rhythm.    Clinical Interpretation:  This EEG, performed during wakefulness and drowsiness/sleep, is abnormal due to moderate generalized slowing which is consistent with global cerebral dysfunction which can be suggestive of a toxic-metabolic encephalopathies of nonspecific etiology or in the setting of underlying dementia. There is no focal asymmetry, seizures or epileptiform discharges seen.    The absence of epileptiform activity neither excludes nor supports the diagnosis of epilepsy. If further suspicion persists, would recommend obtaining a continuous EEG study. Clinical correlation recommended.     Of note, single channel EKG shows an irregular heart rhythm.      Rosa Morrow D.O.

## 2024-02-10 ENCOUNTER — APPOINTMENT (OUTPATIENT)
Facility: HOSPITAL | Age: 79
DRG: 277 | End: 2024-02-10
Payer: MEDICARE

## 2024-02-10 LAB
ANION GAP SERPL CALC-SCNC: 5 MMOL/L (ref 5–15)
BASOPHILS # BLD: 0.1 K/UL (ref 0–0.1)
BASOPHILS NFR BLD: 1 % (ref 0–1)
BUN SERPL-MCNC: 29 MG/DL (ref 6–20)
BUN/CREAT SERPL: 21 (ref 12–20)
CALCIUM SERPL-MCNC: 9.1 MG/DL (ref 8.5–10.1)
CHLORIDE SERPL-SCNC: 105 MMOL/L (ref 97–108)
CO2 SERPL-SCNC: 29 MMOL/L (ref 21–32)
CREAT SERPL-MCNC: 1.41 MG/DL (ref 0.7–1.3)
DIFFERENTIAL METHOD BLD: ABNORMAL
EOSINOPHIL # BLD: 0.2 K/UL (ref 0–0.4)
EOSINOPHIL NFR BLD: 2 % (ref 0–7)
ERYTHROCYTE [DISTWIDTH] IN BLOOD BY AUTOMATED COUNT: 12.2 % (ref 11.5–14.5)
GLUCOSE SERPL-MCNC: 102 MG/DL (ref 65–100)
HCT VFR BLD AUTO: 43.2 % (ref 36.6–50.3)
HGB BLD-MCNC: 14.3 G/DL (ref 12.1–17)
IMM GRANULOCYTES # BLD AUTO: 0.1 K/UL (ref 0–0.04)
IMM GRANULOCYTES NFR BLD AUTO: 0 % (ref 0–0.5)
LYMPHOCYTES # BLD: 2.1 K/UL (ref 0.8–3.5)
LYMPHOCYTES NFR BLD: 18 % (ref 12–49)
MCH RBC QN AUTO: 31.2 PG (ref 26–34)
MCHC RBC AUTO-ENTMCNC: 33.1 G/DL (ref 30–36.5)
MCV RBC AUTO: 94.1 FL (ref 80–99)
MONOCYTES # BLD: 0.8 K/UL (ref 0–1)
MONOCYTES NFR BLD: 7 % (ref 5–13)
NEUTS SEG # BLD: 8.5 K/UL (ref 1.8–8)
NEUTS SEG NFR BLD: 73 % (ref 32–75)
NRBC # BLD: 0 K/UL (ref 0–0.01)
NRBC BLD-RTO: 0 PER 100 WBC
PLATELET # BLD AUTO: 179 K/UL (ref 150–400)
PMV BLD AUTO: 11.6 FL (ref 8.9–12.9)
POTASSIUM SERPL-SCNC: 4 MMOL/L (ref 3.5–5.1)
RBC # BLD AUTO: 4.59 M/UL (ref 4.1–5.7)
SODIUM SERPL-SCNC: 139 MMOL/L (ref 136–145)
WBC # BLD AUTO: 11.7 K/UL (ref 4.1–11.1)

## 2024-02-10 PROCEDURE — 2580000003 HC RX 258: Performed by: STUDENT IN AN ORGANIZED HEALTH CARE EDUCATION/TRAINING PROGRAM

## 2024-02-10 PROCEDURE — 70551 MRI BRAIN STEM W/O DYE: CPT

## 2024-02-10 PROCEDURE — 94761 N-INVAS EAR/PLS OXIMETRY MLT: CPT

## 2024-02-10 PROCEDURE — 85025 COMPLETE CBC W/AUTO DIFF WBC: CPT

## 2024-02-10 PROCEDURE — 6370000000 HC RX 637 (ALT 250 FOR IP): Performed by: STUDENT IN AN ORGANIZED HEALTH CARE EDUCATION/TRAINING PROGRAM

## 2024-02-10 PROCEDURE — 36415 COLL VENOUS BLD VENIPUNCTURE: CPT

## 2024-02-10 PROCEDURE — 6360000002 HC RX W HCPCS: Performed by: STUDENT IN AN ORGANIZED HEALTH CARE EDUCATION/TRAINING PROGRAM

## 2024-02-10 PROCEDURE — 1100000003 HC PRIVATE W/ TELEMETRY

## 2024-02-10 PROCEDURE — 94640 AIRWAY INHALATION TREATMENT: CPT

## 2024-02-10 PROCEDURE — 80048 BASIC METABOLIC PNL TOTAL CA: CPT

## 2024-02-10 RX ADMIN — SODIUM CHLORIDE, PRESERVATIVE FREE 10 ML: 5 INJECTION INTRAVENOUS at 00:53

## 2024-02-10 RX ADMIN — ASPIRIN 81 MG: 81 TABLET, COATED ORAL at 08:54

## 2024-02-10 RX ADMIN — IPRATROPIUM BROMIDE 0.5 MG: 0.5 SOLUTION RESPIRATORY (INHALATION) at 09:45

## 2024-02-10 RX ADMIN — LOSARTAN POTASSIUM 50 MG: 25 TABLET, FILM COATED ORAL at 08:54

## 2024-02-10 RX ADMIN — METOPROLOL TARTRATE 12.5 MG: 25 TABLET, FILM COATED ORAL at 12:06

## 2024-02-10 RX ADMIN — ARFORMOTEROL TARTRATE 15 MCG: 15 SOLUTION RESPIRATORY (INHALATION) at 09:45

## 2024-02-10 RX ADMIN — SODIUM CHLORIDE, PRESERVATIVE FREE 10 ML: 5 INJECTION INTRAVENOUS at 21:00

## 2024-02-10 RX ADMIN — POLYETHYLENE GLYCOL 3350 17 G: 17 POWDER, FOR SOLUTION ORAL at 12:06

## 2024-02-10 RX ADMIN — SODIUM CHLORIDE, PRESERVATIVE FREE 10 ML: 5 INJECTION INTRAVENOUS at 00:54

## 2024-02-10 RX ADMIN — SODIUM CHLORIDE, PRESERVATIVE FREE 10 ML: 5 INJECTION INTRAVENOUS at 09:23

## 2024-02-10 NOTE — CARE COORDINATION
Care Management Initial Assessment       RUR: 15%  Readmission? No  1st IM letter given? Yes - 2/8/24  1st  letter given: No        CM introduce self, explain role and confirmed demographics with pt.    Pt lives in an one story home( Residential Home) with four-six steps to enter.     Pt has a hx of using home health and SNF/inpatient rehab but was unable to recall the names.    Pt uses [facility] on [address]    At the time of d/c pt's family/caregiver will transport.    CM will follow and assist with d/c planning.       02/10/24 1047   Service Assessment   Patient Orientation Alert and Oriented   Cognition Other (see comment)  (Alert with confusion/forgetfulness)   Primary Caregiver Friend   Support Systems Family Members;Friends/Neighbors   Patient's Healthcare Decision Maker is: Legal Next of Kin  (Pt's sister Josefina Ferrell)   PCP Verified by CM Yes   Last Visit to PCP   (Pt stated he saw his PCP last week)   Prior Functional Level Assistance with the following:;Bathing;Cooking;Housework;Shopping;Mobility   Current Functional Level Assistance with the following:;Bathing;Cooking;Housework;Shopping;Mobility   Can patient return to prior living arrangement Yes   Family able to assist with home care needs: Yes   Would you like for me to discuss the discharge plan with any other family members/significant others, and if so, who? Yes  (Pt's sister Josefina Ferrell)   Financial Resources Medicare;Other (Comment)  (BCBS)   Community Resources None   Social/Functional History   Lives With Other (comment)  (Residential Home)   Type of Home House   Home Equipment Cane;Grab bars;Walker, rolling  (Shower chair)   Active  No   Patient's  Info Pt's family or the residential home   Discharge Planning   Type of Residence House   Current Services Prior To Admission Durable Medical Equipment   Current DME Prior to Arrival Cane;Walker   Patient expects to be discharged to: House     Advance Care Planning

## 2024-02-11 ENCOUNTER — APPOINTMENT (OUTPATIENT)
Facility: HOSPITAL | Age: 79
DRG: 277 | End: 2024-02-11
Attending: STUDENT IN AN ORGANIZED HEALTH CARE EDUCATION/TRAINING PROGRAM
Payer: MEDICARE

## 2024-02-11 LAB
ANION GAP SERPL CALC-SCNC: 6 MMOL/L (ref 5–15)
BASOPHILS # BLD: 0.1 K/UL (ref 0–0.1)
BASOPHILS NFR BLD: 1 % (ref 0–1)
BUN SERPL-MCNC: 35 MG/DL (ref 6–20)
BUN/CREAT SERPL: 22 (ref 12–20)
CALCIUM SERPL-MCNC: 8.8 MG/DL (ref 8.5–10.1)
CHLORIDE SERPL-SCNC: 104 MMOL/L (ref 97–108)
CO2 SERPL-SCNC: 27 MMOL/L (ref 21–32)
CREAT SERPL-MCNC: 1.58 MG/DL (ref 0.7–1.3)
DIFFERENTIAL METHOD BLD: ABNORMAL
EOSINOPHIL # BLD: 0.3 K/UL (ref 0–0.4)
EOSINOPHIL NFR BLD: 2 % (ref 0–7)
ERYTHROCYTE [DISTWIDTH] IN BLOOD BY AUTOMATED COUNT: 12.3 % (ref 11.5–14.5)
GLUCOSE SERPL-MCNC: 146 MG/DL (ref 65–100)
HCT VFR BLD AUTO: 40.1 % (ref 36.6–50.3)
HGB BLD-MCNC: 13.6 G/DL (ref 12.1–17)
IMM GRANULOCYTES # BLD AUTO: 0.1 K/UL (ref 0–0.04)
IMM GRANULOCYTES NFR BLD AUTO: 1 % (ref 0–0.5)
LYMPHOCYTES # BLD: 2.4 K/UL (ref 0.8–3.5)
LYMPHOCYTES NFR BLD: 18 % (ref 12–49)
MCH RBC QN AUTO: 31.3 PG (ref 26–34)
MCHC RBC AUTO-ENTMCNC: 33.9 G/DL (ref 30–36.5)
MCV RBC AUTO: 92.4 FL (ref 80–99)
MONOCYTES # BLD: 1.1 K/UL (ref 0–1)
MONOCYTES NFR BLD: 8 % (ref 5–13)
NEUTS SEG # BLD: 9.6 K/UL (ref 1.8–8)
NEUTS SEG NFR BLD: 70 % (ref 32–75)
NRBC # BLD: 0 K/UL (ref 0–0.01)
NRBC BLD-RTO: 0 PER 100 WBC
PLATELET # BLD AUTO: 191 K/UL (ref 150–400)
PMV BLD AUTO: 12.3 FL (ref 8.9–12.9)
POTASSIUM SERPL-SCNC: 3.9 MMOL/L (ref 3.5–5.1)
RBC # BLD AUTO: 4.34 M/UL (ref 4.1–5.7)
SODIUM SERPL-SCNC: 137 MMOL/L (ref 136–145)
WBC # BLD AUTO: 13.6 K/UL (ref 4.1–11.1)

## 2024-02-11 PROCEDURE — 93306 TTE W/DOPPLER COMPLETE: CPT

## 2024-02-11 PROCEDURE — 1100000003 HC PRIVATE W/ TELEMETRY

## 2024-02-11 PROCEDURE — 36415 COLL VENOUS BLD VENIPUNCTURE: CPT

## 2024-02-11 PROCEDURE — 97530 THERAPEUTIC ACTIVITIES: CPT

## 2024-02-11 PROCEDURE — 6360000002 HC RX W HCPCS: Performed by: STUDENT IN AN ORGANIZED HEALTH CARE EDUCATION/TRAINING PROGRAM

## 2024-02-11 PROCEDURE — 6370000000 HC RX 637 (ALT 250 FOR IP): Performed by: STUDENT IN AN ORGANIZED HEALTH CARE EDUCATION/TRAINING PROGRAM

## 2024-02-11 PROCEDURE — 97116 GAIT TRAINING THERAPY: CPT

## 2024-02-11 PROCEDURE — 94761 N-INVAS EAR/PLS OXIMETRY MLT: CPT

## 2024-02-11 PROCEDURE — 97166 OT EVAL MOD COMPLEX 45 MIN: CPT

## 2024-02-11 PROCEDURE — 2580000003 HC RX 258: Performed by: STUDENT IN AN ORGANIZED HEALTH CARE EDUCATION/TRAINING PROGRAM

## 2024-02-11 PROCEDURE — 97535 SELF CARE MNGMENT TRAINING: CPT

## 2024-02-11 PROCEDURE — 80048 BASIC METABOLIC PNL TOTAL CA: CPT

## 2024-02-11 PROCEDURE — 97161 PT EVAL LOW COMPLEX 20 MIN: CPT

## 2024-02-11 PROCEDURE — 94640 AIRWAY INHALATION TREATMENT: CPT

## 2024-02-11 PROCEDURE — 85025 COMPLETE CBC W/AUTO DIFF WBC: CPT

## 2024-02-11 RX ORDER — LOSARTAN POTASSIUM 100 MG/1
100 TABLET ORAL DAILY
Status: DISCONTINUED | OUTPATIENT
Start: 2024-02-12 | End: 2024-02-20 | Stop reason: HOSPADM

## 2024-02-11 RX ORDER — AMLODIPINE BESYLATE 5 MG/1
10 TABLET ORAL DAILY
Status: DISCONTINUED | OUTPATIENT
Start: 2024-02-11 | End: 2024-02-20 | Stop reason: HOSPADM

## 2024-02-11 RX ORDER — SENNA AND DOCUSATE SODIUM 50; 8.6 MG/1; MG/1
2 TABLET, FILM COATED ORAL DAILY PRN
Status: DISCONTINUED | OUTPATIENT
Start: 2024-02-11 | End: 2024-02-20 | Stop reason: HOSPADM

## 2024-02-11 RX ADMIN — ARFORMOTEROL TARTRATE 15 MCG: 15 SOLUTION RESPIRATORY (INHALATION) at 19:53

## 2024-02-11 RX ADMIN — ASPIRIN 81 MG: 81 TABLET, COATED ORAL at 08:28

## 2024-02-11 RX ADMIN — ONDANSETRON 4 MG: 2 INJECTION INTRAMUSCULAR; INTRAVENOUS at 09:24

## 2024-02-11 RX ADMIN — LOSARTAN POTASSIUM 50 MG: 25 TABLET, FILM COATED ORAL at 08:28

## 2024-02-11 RX ADMIN — METOPROLOL TARTRATE 12.5 MG: 25 TABLET, FILM COATED ORAL at 00:00

## 2024-02-11 RX ADMIN — DOCUSATE SODIUM AND SENNOSIDES 2 TABLET: 8.6; 5 TABLET, FILM COATED ORAL at 08:52

## 2024-02-11 RX ADMIN — IPRATROPIUM BROMIDE 0.5 MG: 0.5 SOLUTION RESPIRATORY (INHALATION) at 19:53

## 2024-02-11 RX ADMIN — SODIUM CHLORIDE, PRESERVATIVE FREE 10 ML: 5 INJECTION INTRAVENOUS at 08:31

## 2024-02-11 RX ADMIN — SODIUM CHLORIDE, PRESERVATIVE FREE 10 ML: 5 INJECTION INTRAVENOUS at 20:40

## 2024-02-11 NOTE — CARE COORDINATION
Transition of Care Plan:    RUR: 15%  Prior Level of Functioning: ambulating, but needed assistance/encouragement with some ADL's  Disposition: home with caregiver  If SNF or IPR: Date FOC offered: no therapy notes to justify  Date FOC received:   Accepting facility:   Date authorization started with reference number:   Date authorization received and expires:   Follow up appointments: PCP and specialists  DME needed: none  Transportation at discharge: friend to transport  IM/IMM Medicare/ letter given: 2/8/24  Is patient a  and connected with [facility]? na   If yes, was  transfer form completed and [facility] notified?   Caregiver Contact: Fransisco Flores [phone]  Discharge Caregiver contacted prior to discharge? yes  Care Conference needed? no  Barriers to discharge:  home situation, therapy eval, patient medically stable    CM reviewed chart. FLOR spoke with Dr Son about patient discharge. Patient would like to leave soon, but caregiver is not ready to accept, per MD.     CM spoke with patient and gave CM permission to talk to caregiver, Fransisco Folres.    CM called Fransisco Mark and he has many concerns. Patient was reported to be lying in bed, smoking and not caring for himself at home. Patient depending on Fransisco to give him his medications. He had home care in the past, but reportedly sent them away. KB would like to speak with the physician. He cannot transport the patient today. CM will need to check back tomorrow. CM does not see any therapy notes to justify home health.     CM spoke with patient about KB's inability to transport him home. CM asked about home health and encouraged the patient to work with PT/OT while here. Patient did not seem interested in home health. He stated he understood why he cannot go home today. He gave CM permission to ask the doctor to speak with KB.    FLOR sent message to Dr Son. FLOR will continue to follow.    Le Canseco, RN  Care Manager  [id number]

## 2024-02-12 LAB
ANION GAP SERPL CALC-SCNC: 4 MMOL/L (ref 5–15)
BASOPHILS # BLD: 0.1 K/UL (ref 0–0.1)
BASOPHILS NFR BLD: 1 % (ref 0–1)
BUN SERPL-MCNC: 33 MG/DL (ref 6–20)
BUN/CREAT SERPL: 21 (ref 12–20)
CALCIUM SERPL-MCNC: 8.9 MG/DL (ref 8.5–10.1)
CHLORIDE SERPL-SCNC: 107 MMOL/L (ref 97–108)
CO2 SERPL-SCNC: 30 MMOL/L (ref 21–32)
CREAT SERPL-MCNC: 1.57 MG/DL (ref 0.7–1.3)
DIFFERENTIAL METHOD BLD: ABNORMAL
ECHO AO ASC DIAM: 3.7 CM
ECHO AO ASCENDING AORTA INDEX: 1.94 CM/M2
ECHO AV PEAK GRADIENT: 6 MMHG
ECHO AV PEAK VELOCITY: 1.3 M/S
ECHO AV VELOCITY RATIO: 0.77
ECHO BSA: 1.89 M2
ECHO LV FRACTIONAL SHORTENING: 23 % (ref 28–44)
ECHO LV INTERNAL DIMENSION DIASTOLE INDEX: 2.3 CM/M2
ECHO LV INTERNAL DIMENSION DIASTOLIC: 4.4 CM (ref 4.2–5.9)
ECHO LV INTERNAL DIMENSION SYSTOLIC INDEX: 1.78 CM/M2
ECHO LV INTERNAL DIMENSION SYSTOLIC: 3.4 CM
ECHO LV IVSD: 1.4 CM (ref 0.6–1)
ECHO LV MASS 2D: 227.5 G (ref 88–224)
ECHO LV MASS INDEX 2D: 119.1 G/M2 (ref 49–115)
ECHO LV POSTERIOR WALL DIASTOLIC: 1.3 CM (ref 0.6–1)
ECHO LV RELATIVE WALL THICKNESS RATIO: 0.59
ECHO LVOT MEAN GRADIENT: 2 MMHG
ECHO LVOT PEAK GRADIENT: 4 MMHG
ECHO LVOT PEAK VELOCITY: 1 M/S
ECHO LVOT VTI: 21.7 CM
ECHO MV A VELOCITY: 0.94 M/S
ECHO MV E DECELERATION TIME (DT): 359.7 MS
ECHO MV E VELOCITY: 0.43 M/S
ECHO MV E/A RATIO: 0.46
ECHO PV MAX VELOCITY: 1.2 M/S
ECHO PV PEAK GRADIENT: 5 MMHG
ECHO RV FREE WALL PEAK S': 10 CM/S
ECHO RV TAPSE: 1.4 CM (ref 1.7–?)
EOSINOPHIL # BLD: 0.3 K/UL (ref 0–0.4)
EOSINOPHIL NFR BLD: 2 % (ref 0–7)
ERYTHROCYTE [DISTWIDTH] IN BLOOD BY AUTOMATED COUNT: 12.3 % (ref 11.5–14.5)
GLUCOSE SERPL-MCNC: 128 MG/DL (ref 65–100)
HCT VFR BLD AUTO: 39.6 % (ref 36.6–50.3)
HGB BLD-MCNC: 13.4 G/DL (ref 12.1–17)
IMM GRANULOCYTES # BLD AUTO: 0.1 K/UL (ref 0–0.04)
IMM GRANULOCYTES NFR BLD AUTO: 1 % (ref 0–0.5)
LYMPHOCYTES # BLD: 2.4 K/UL (ref 0.8–3.5)
LYMPHOCYTES NFR BLD: 22 % (ref 12–49)
MCH RBC QN AUTO: 31.3 PG (ref 26–34)
MCHC RBC AUTO-ENTMCNC: 33.8 G/DL (ref 30–36.5)
MCV RBC AUTO: 92.5 FL (ref 80–99)
MONOCYTES # BLD: 1 K/UL (ref 0–1)
MONOCYTES NFR BLD: 10 % (ref 5–13)
NEUTS SEG # BLD: 7 K/UL (ref 1.8–8)
NEUTS SEG NFR BLD: 64 % (ref 32–75)
NRBC # BLD: 0 K/UL (ref 0–0.01)
NRBC BLD-RTO: 0 PER 100 WBC
PLATELET # BLD AUTO: 187 K/UL (ref 150–400)
PMV BLD AUTO: 11.8 FL (ref 8.9–12.9)
POTASSIUM SERPL-SCNC: 4.2 MMOL/L (ref 3.5–5.1)
RBC # BLD AUTO: 4.28 M/UL (ref 4.1–5.7)
SODIUM SERPL-SCNC: 141 MMOL/L (ref 136–145)
WBC # BLD AUTO: 10.7 K/UL (ref 4.1–11.1)

## 2024-02-12 PROCEDURE — 2580000003 HC RX 258: Performed by: STUDENT IN AN ORGANIZED HEALTH CARE EDUCATION/TRAINING PROGRAM

## 2024-02-12 PROCEDURE — 6360000002 HC RX W HCPCS: Performed by: STUDENT IN AN ORGANIZED HEALTH CARE EDUCATION/TRAINING PROGRAM

## 2024-02-12 PROCEDURE — 94640 AIRWAY INHALATION TREATMENT: CPT

## 2024-02-12 PROCEDURE — 6370000000 HC RX 637 (ALT 250 FOR IP): Performed by: STUDENT IN AN ORGANIZED HEALTH CARE EDUCATION/TRAINING PROGRAM

## 2024-02-12 PROCEDURE — 80048 BASIC METABOLIC PNL TOTAL CA: CPT

## 2024-02-12 PROCEDURE — 94761 N-INVAS EAR/PLS OXIMETRY MLT: CPT

## 2024-02-12 PROCEDURE — 1100000003 HC PRIVATE W/ TELEMETRY

## 2024-02-12 PROCEDURE — 85025 COMPLETE CBC W/AUTO DIFF WBC: CPT

## 2024-02-12 PROCEDURE — 36415 COLL VENOUS BLD VENIPUNCTURE: CPT

## 2024-02-12 RX ADMIN — ASPIRIN 81 MG: 81 TABLET, COATED ORAL at 08:47

## 2024-02-12 RX ADMIN — AMLODIPINE BESYLATE 10 MG: 5 TABLET ORAL at 08:47

## 2024-02-12 RX ADMIN — IPRATROPIUM BROMIDE 0.5 MG: 0.5 SOLUTION RESPIRATORY (INHALATION) at 07:15

## 2024-02-12 RX ADMIN — PAROXETINE HYDROCHLORIDE 20 MG: 20 TABLET, FILM COATED ORAL at 08:47

## 2024-02-12 RX ADMIN — LOSARTAN POTASSIUM 100 MG: 100 TABLET, FILM COATED ORAL at 08:47

## 2024-02-12 RX ADMIN — SODIUM CHLORIDE, PRESERVATIVE FREE 10 ML: 5 INJECTION INTRAVENOUS at 08:47

## 2024-02-12 RX ADMIN — METOPROLOL TARTRATE 12.5 MG: 25 TABLET, FILM COATED ORAL at 13:20

## 2024-02-12 RX ADMIN — SODIUM CHLORIDE, PRESERVATIVE FREE 10 ML: 5 INJECTION INTRAVENOUS at 22:22

## 2024-02-12 RX ADMIN — ARFORMOTEROL TARTRATE 15 MCG: 15 SOLUTION RESPIRATORY (INHALATION) at 07:15

## 2024-02-13 LAB
ANION GAP SERPL CALC-SCNC: 3 MMOL/L (ref 5–15)
BASOPHILS # BLD: 0.1 K/UL (ref 0–0.1)
BASOPHILS NFR BLD: 1 % (ref 0–1)
BUN SERPL-MCNC: 27 MG/DL (ref 6–20)
BUN/CREAT SERPL: 17 (ref 12–20)
CALCIUM SERPL-MCNC: 8.8 MG/DL (ref 8.5–10.1)
CHLORIDE SERPL-SCNC: 108 MMOL/L (ref 97–108)
CO2 SERPL-SCNC: 30 MMOL/L (ref 21–32)
CREAT SERPL-MCNC: 1.61 MG/DL (ref 0.7–1.3)
DIFFERENTIAL METHOD BLD: ABNORMAL
EOSINOPHIL # BLD: 0.3 K/UL (ref 0–0.4)
EOSINOPHIL NFR BLD: 3 % (ref 0–7)
ERYTHROCYTE [DISTWIDTH] IN BLOOD BY AUTOMATED COUNT: 12.3 % (ref 11.5–14.5)
GLUCOSE SERPL-MCNC: 112 MG/DL (ref 65–100)
HCT VFR BLD AUTO: 41 % (ref 36.6–50.3)
HGB BLD-MCNC: 13.5 G/DL (ref 12.1–17)
IMM GRANULOCYTES # BLD AUTO: 0.1 K/UL (ref 0–0.04)
IMM GRANULOCYTES NFR BLD AUTO: 1 % (ref 0–0.5)
LYMPHOCYTES # BLD: 2.1 K/UL (ref 0.8–3.5)
LYMPHOCYTES NFR BLD: 20 % (ref 12–49)
MCH RBC QN AUTO: 31.8 PG (ref 26–34)
MCHC RBC AUTO-ENTMCNC: 32.9 G/DL (ref 30–36.5)
MCV RBC AUTO: 96.5 FL (ref 80–99)
MONOCYTES # BLD: 0.9 K/UL (ref 0–1)
MONOCYTES NFR BLD: 9 % (ref 5–13)
NEUTS SEG # BLD: 7 K/UL (ref 1.8–8)
NEUTS SEG NFR BLD: 66 % (ref 32–75)
NRBC # BLD: 0 K/UL (ref 0–0.01)
NRBC BLD-RTO: 0 PER 100 WBC
PLATELET # BLD AUTO: 176 K/UL (ref 150–400)
PMV BLD AUTO: 12.5 FL (ref 8.9–12.9)
POTASSIUM SERPL-SCNC: 4.3 MMOL/L (ref 3.5–5.1)
RBC # BLD AUTO: 4.25 M/UL (ref 4.1–5.7)
SODIUM SERPL-SCNC: 141 MMOL/L (ref 136–145)
WBC # BLD AUTO: 10.5 K/UL (ref 4.1–11.1)

## 2024-02-13 PROCEDURE — 1100000003 HC PRIVATE W/ TELEMETRY

## 2024-02-13 PROCEDURE — 6360000002 HC RX W HCPCS: Performed by: STUDENT IN AN ORGANIZED HEALTH CARE EDUCATION/TRAINING PROGRAM

## 2024-02-13 PROCEDURE — 85025 COMPLETE CBC W/AUTO DIFF WBC: CPT

## 2024-02-13 PROCEDURE — 2580000003 HC RX 258: Performed by: STUDENT IN AN ORGANIZED HEALTH CARE EDUCATION/TRAINING PROGRAM

## 2024-02-13 PROCEDURE — 94640 AIRWAY INHALATION TREATMENT: CPT

## 2024-02-13 PROCEDURE — 6370000000 HC RX 637 (ALT 250 FOR IP): Performed by: STUDENT IN AN ORGANIZED HEALTH CARE EDUCATION/TRAINING PROGRAM

## 2024-02-13 PROCEDURE — 80048 BASIC METABOLIC PNL TOTAL CA: CPT

## 2024-02-13 PROCEDURE — 94761 N-INVAS EAR/PLS OXIMETRY MLT: CPT

## 2024-02-13 PROCEDURE — 36415 COLL VENOUS BLD VENIPUNCTURE: CPT

## 2024-02-13 RX ORDER — METOPROLOL SUCCINATE 25 MG/1
12.5 TABLET, EXTENDED RELEASE ORAL DAILY
Status: DISCONTINUED | OUTPATIENT
Start: 2024-02-13 | End: 2024-02-20

## 2024-02-13 RX ORDER — SODIUM CHLORIDE 9 MG/ML
INJECTION, SOLUTION INTRAVENOUS CONTINUOUS
Status: DISCONTINUED | OUTPATIENT
Start: 2024-02-13 | End: 2024-02-17

## 2024-02-13 RX ADMIN — DOCUSATE SODIUM AND SENNOSIDES 2 TABLET: 8.6; 5 TABLET, FILM COATED ORAL at 12:00

## 2024-02-13 RX ADMIN — ONDANSETRON 4 MG: 2 INJECTION INTRAMUSCULAR; INTRAVENOUS at 17:29

## 2024-02-13 RX ADMIN — SODIUM CHLORIDE, PRESERVATIVE FREE 10 ML: 5 INJECTION INTRAVENOUS at 09:08

## 2024-02-13 RX ADMIN — POLYETHYLENE GLYCOL 3350 17 G: 17 POWDER, FOR SOLUTION ORAL at 12:00

## 2024-02-13 RX ADMIN — SODIUM CHLORIDE: 9 INJECTION, SOLUTION INTRAVENOUS at 17:26

## 2024-02-13 RX ADMIN — IPRATROPIUM BROMIDE 0.5 MG: 0.5 SOLUTION RESPIRATORY (INHALATION) at 08:33

## 2024-02-13 RX ADMIN — LOSARTAN POTASSIUM 100 MG: 100 TABLET, FILM COATED ORAL at 09:07

## 2024-02-13 RX ADMIN — ASPIRIN 81 MG: 81 TABLET, COATED ORAL at 09:07

## 2024-02-13 RX ADMIN — AMLODIPINE BESYLATE 10 MG: 5 TABLET ORAL at 09:07

## 2024-02-13 RX ADMIN — METOPROLOL SUCCINATE 12.5 MG: 25 TABLET, EXTENDED RELEASE ORAL at 12:00

## 2024-02-13 RX ADMIN — ARFORMOTEROL TARTRATE 15 MCG: 15 SOLUTION RESPIRATORY (INHALATION) at 08:38

## 2024-02-13 RX ADMIN — PAROXETINE HYDROCHLORIDE 20 MG: 20 TABLET, FILM COATED ORAL at 09:07

## 2024-02-13 ASSESSMENT — PAIN SCALES - GENERAL
PAINLEVEL_OUTOF10: 0
PAINLEVEL_OUTOF10: 0

## 2024-02-14 LAB
ACT BLD: 131 SECS (ref 79–138)
ACT BLD: 185 SECS (ref 79–138)
ANION GAP SERPL CALC-SCNC: 3 MMOL/L (ref 5–15)
BASOPHILS # BLD: 0.1 K/UL (ref 0–0.1)
BASOPHILS NFR BLD: 1 % (ref 0–1)
BUN SERPL-MCNC: 24 MG/DL (ref 6–20)
BUN/CREAT SERPL: 17 (ref 12–20)
CALCIUM SERPL-MCNC: 8.2 MG/DL (ref 8.5–10.1)
CHLORIDE SERPL-SCNC: 111 MMOL/L (ref 97–108)
CO2 SERPL-SCNC: 28 MMOL/L (ref 21–32)
CREAT SERPL-MCNC: 1.38 MG/DL (ref 0.7–1.3)
DIFFERENTIAL METHOD BLD: ABNORMAL
ECHO BSA: 1.89 M2
EOSINOPHIL # BLD: 0.3 K/UL (ref 0–0.4)
EOSINOPHIL NFR BLD: 3 % (ref 0–7)
ERYTHROCYTE [DISTWIDTH] IN BLOOD BY AUTOMATED COUNT: 12.4 % (ref 11.5–14.5)
GLUCOSE SERPL-MCNC: 104 MG/DL (ref 65–100)
HCT VFR BLD AUTO: 37.2 % (ref 36.6–50.3)
HGB BLD-MCNC: 12.2 G/DL (ref 12.1–17)
IMM GRANULOCYTES # BLD AUTO: 0.1 K/UL (ref 0–0.04)
IMM GRANULOCYTES NFR BLD AUTO: 1 % (ref 0–0.5)
LYMPHOCYTES # BLD: 2 K/UL (ref 0.8–3.5)
LYMPHOCYTES NFR BLD: 19 % (ref 12–49)
MCH RBC QN AUTO: 31.9 PG (ref 26–34)
MCHC RBC AUTO-ENTMCNC: 32.8 G/DL (ref 30–36.5)
MCV RBC AUTO: 97.4 FL (ref 80–99)
MONOCYTES # BLD: 1 K/UL (ref 0–1)
MONOCYTES NFR BLD: 9 % (ref 5–13)
NEUTS SEG # BLD: 7.1 K/UL (ref 1.8–8)
NEUTS SEG NFR BLD: 67 % (ref 32–75)
NRBC # BLD: 0 K/UL (ref 0–0.01)
NRBC BLD-RTO: 0 PER 100 WBC
PLATELET # BLD AUTO: 168 K/UL (ref 150–400)
PMV BLD AUTO: 12.2 FL (ref 8.9–12.9)
POTASSIUM SERPL-SCNC: 4.4 MMOL/L (ref 3.5–5.1)
RBC # BLD AUTO: 3.82 M/UL (ref 4.1–5.7)
SODIUM SERPL-SCNC: 142 MMOL/L (ref 136–145)
WBC # BLD AUTO: 10.5 K/UL (ref 4.1–11.1)

## 2024-02-14 PROCEDURE — 2580000003 HC RX 258: Performed by: STUDENT IN AN ORGANIZED HEALTH CARE EDUCATION/TRAINING PROGRAM

## 2024-02-14 PROCEDURE — 93458 L HRT ARTERY/VENTRICLE ANGIO: CPT | Performed by: STUDENT IN AN ORGANIZED HEALTH CARE EDUCATION/TRAINING PROGRAM

## 2024-02-14 PROCEDURE — 85025 COMPLETE CBC W/AUTO DIFF WBC: CPT

## 2024-02-14 PROCEDURE — 4A023N7 MEASUREMENT OF CARDIAC SAMPLING AND PRESSURE, LEFT HEART, PERCUTANEOUS APPROACH: ICD-10-PCS | Performed by: STUDENT IN AN ORGANIZED HEALTH CARE EDUCATION/TRAINING PROGRAM

## 2024-02-14 PROCEDURE — 6370000000 HC RX 637 (ALT 250 FOR IP): Performed by: STUDENT IN AN ORGANIZED HEALTH CARE EDUCATION/TRAINING PROGRAM

## 2024-02-14 PROCEDURE — 2709999900 HC NON-CHARGEABLE SUPPLY: Performed by: STUDENT IN AN ORGANIZED HEALTH CARE EDUCATION/TRAINING PROGRAM

## 2024-02-14 PROCEDURE — 76937 US GUIDE VASCULAR ACCESS: CPT | Performed by: STUDENT IN AN ORGANIZED HEALTH CARE EDUCATION/TRAINING PROGRAM

## 2024-02-14 PROCEDURE — 80048 BASIC METABOLIC PNL TOTAL CA: CPT

## 2024-02-14 PROCEDURE — 6360000004 HC RX CONTRAST MEDICATION: Performed by: STUDENT IN AN ORGANIZED HEALTH CARE EDUCATION/TRAINING PROGRAM

## 2024-02-14 PROCEDURE — C1769 GUIDE WIRE: HCPCS | Performed by: STUDENT IN AN ORGANIZED HEALTH CARE EDUCATION/TRAINING PROGRAM

## 2024-02-14 PROCEDURE — 94640 AIRWAY INHALATION TREATMENT: CPT

## 2024-02-14 PROCEDURE — 99153 MOD SED SAME PHYS/QHP EA: CPT | Performed by: STUDENT IN AN ORGANIZED HEALTH CARE EDUCATION/TRAINING PROGRAM

## 2024-02-14 PROCEDURE — 6360000002 HC RX W HCPCS: Performed by: STUDENT IN AN ORGANIZED HEALTH CARE EDUCATION/TRAINING PROGRAM

## 2024-02-14 PROCEDURE — 1100000003 HC PRIVATE W/ TELEMETRY

## 2024-02-14 PROCEDURE — B2131ZZ FLUOROSCOPY OF MULTIPLE CORONARY ARTERY BYPASS GRAFTS USING LOW OSMOLAR CONTRAST: ICD-10-PCS | Performed by: STUDENT IN AN ORGANIZED HEALTH CARE EDUCATION/TRAINING PROGRAM

## 2024-02-14 PROCEDURE — 99152 MOD SED SAME PHYS/QHP 5/>YRS: CPT | Performed by: STUDENT IN AN ORGANIZED HEALTH CARE EDUCATION/TRAINING PROGRAM

## 2024-02-14 PROCEDURE — C1894 INTRO/SHEATH, NON-LASER: HCPCS | Performed by: STUDENT IN AN ORGANIZED HEALTH CARE EDUCATION/TRAINING PROGRAM

## 2024-02-14 PROCEDURE — 2500000003 HC RX 250 WO HCPCS: Performed by: STUDENT IN AN ORGANIZED HEALTH CARE EDUCATION/TRAINING PROGRAM

## 2024-02-14 PROCEDURE — 36415 COLL VENOUS BLD VENIPUNCTURE: CPT

## 2024-02-14 PROCEDURE — 85347 COAGULATION TIME ACTIVATED: CPT

## 2024-02-14 PROCEDURE — B2111ZZ FLUOROSCOPY OF MULTIPLE CORONARY ARTERIES USING LOW OSMOLAR CONTRAST: ICD-10-PCS | Performed by: STUDENT IN AN ORGANIZED HEALTH CARE EDUCATION/TRAINING PROGRAM

## 2024-02-14 RX ORDER — SODIUM CHLORIDE 0.9 % (FLUSH) 0.9 %
5-40 SYRINGE (ML) INJECTION PRN
Status: DISCONTINUED | OUTPATIENT
Start: 2024-02-14 | End: 2024-02-20 | Stop reason: HOSPADM

## 2024-02-14 RX ORDER — FENTANYL CITRATE 50 UG/ML
INJECTION, SOLUTION INTRAMUSCULAR; INTRAVENOUS PRN
Status: DISCONTINUED | OUTPATIENT
Start: 2024-02-14 | End: 2024-02-14 | Stop reason: HOSPADM

## 2024-02-14 RX ORDER — HYDRALAZINE HYDROCHLORIDE 20 MG/ML
10 INJECTION INTRAMUSCULAR; INTRAVENOUS EVERY 10 MIN PRN
Status: DISCONTINUED | OUTPATIENT
Start: 2024-02-14 | End: 2024-02-20 | Stop reason: HOSPADM

## 2024-02-14 RX ORDER — LIDOCAINE HYDROCHLORIDE 10 MG/ML
INJECTION, SOLUTION INFILTRATION; PERINEURAL PRN
Status: DISCONTINUED | OUTPATIENT
Start: 2024-02-14 | End: 2024-02-14 | Stop reason: HOSPADM

## 2024-02-14 RX ORDER — HEPARIN SODIUM 10000 [USP'U]/ML
INJECTION, SOLUTION INTRAVENOUS; SUBCUTANEOUS PRN
Status: DISCONTINUED | OUTPATIENT
Start: 2024-02-14 | End: 2024-02-14 | Stop reason: HOSPADM

## 2024-02-14 RX ORDER — HEPARIN SODIUM 1000 [USP'U]/ML
INJECTION, SOLUTION INTRAVENOUS; SUBCUTANEOUS PRN
Status: DISCONTINUED | OUTPATIENT
Start: 2024-02-14 | End: 2024-02-14 | Stop reason: HOSPADM

## 2024-02-14 RX ORDER — SODIUM CHLORIDE 0.9 % (FLUSH) 0.9 %
5-40 SYRINGE (ML) INJECTION EVERY 12 HOURS SCHEDULED
Status: DISCONTINUED | OUTPATIENT
Start: 2024-02-14 | End: 2024-02-20 | Stop reason: HOSPADM

## 2024-02-14 RX ORDER — ACETAMINOPHEN 325 MG/1
650 TABLET ORAL EVERY 4 HOURS PRN
Status: DISCONTINUED | OUTPATIENT
Start: 2024-02-14 | End: 2024-02-20 | Stop reason: HOSPADM

## 2024-02-14 RX ORDER — 0.9 % SODIUM CHLORIDE 0.9 %
INTRAVENOUS SOLUTION INTRAVENOUS CONTINUOUS PRN
Status: COMPLETED | OUTPATIENT
Start: 2024-02-14 | End: 2024-02-14

## 2024-02-14 RX ORDER — SODIUM CHLORIDE 9 MG/ML
INJECTION, SOLUTION INTRAVENOUS PRN
Status: DISCONTINUED | OUTPATIENT
Start: 2024-02-14 | End: 2024-02-20 | Stop reason: HOSPADM

## 2024-02-14 RX ORDER — VERAPAMIL HYDROCHLORIDE 2.5 MG/ML
INJECTION, SOLUTION INTRAVENOUS PRN
Status: DISCONTINUED | OUTPATIENT
Start: 2024-02-14 | End: 2024-02-14 | Stop reason: HOSPADM

## 2024-02-14 RX ORDER — ONDANSETRON 2 MG/ML
4 INJECTION INTRAMUSCULAR; INTRAVENOUS EVERY 6 HOURS PRN
Status: DISCONTINUED | OUTPATIENT
Start: 2024-02-14 | End: 2024-02-14 | Stop reason: SDUPTHER

## 2024-02-14 RX ADMIN — METOPROLOL SUCCINATE 12.5 MG: 25 TABLET, EXTENDED RELEASE ORAL at 08:10

## 2024-02-14 RX ADMIN — IPRATROPIUM BROMIDE 0.5 MG: 0.5 SOLUTION RESPIRATORY (INHALATION) at 09:21

## 2024-02-14 RX ADMIN — PAROXETINE HYDROCHLORIDE 20 MG: 20 TABLET, FILM COATED ORAL at 08:10

## 2024-02-14 RX ADMIN — ARFORMOTEROL TARTRATE 15 MCG: 15 SOLUTION RESPIRATORY (INHALATION) at 09:22

## 2024-02-14 RX ADMIN — LOSARTAN POTASSIUM 100 MG: 100 TABLET, FILM COATED ORAL at 08:10

## 2024-02-14 RX ADMIN — ASPIRIN 81 MG: 81 TABLET, COATED ORAL at 08:10

## 2024-02-14 RX ADMIN — AMLODIPINE BESYLATE 10 MG: 5 TABLET ORAL at 08:09

## 2024-02-14 RX ADMIN — SODIUM CHLORIDE: 9 INJECTION, SOLUTION INTRAVENOUS at 03:27

## 2024-02-15 LAB
ANION GAP SERPL CALC-SCNC: 5 MMOL/L (ref 5–15)
BASOPHILS # BLD: 0.1 K/UL (ref 0–0.1)
BASOPHILS NFR BLD: 1 % (ref 0–1)
BUN SERPL-MCNC: 18 MG/DL (ref 6–20)
BUN/CREAT SERPL: 15 (ref 12–20)
CALCIUM SERPL-MCNC: 8.5 MG/DL (ref 8.5–10.1)
CHLORIDE SERPL-SCNC: 108 MMOL/L (ref 97–108)
CO2 SERPL-SCNC: 27 MMOL/L (ref 21–32)
CREAT SERPL-MCNC: 1.17 MG/DL (ref 0.7–1.3)
DIFFERENTIAL METHOD BLD: ABNORMAL
EOSINOPHIL # BLD: 0.3 K/UL (ref 0–0.4)
EOSINOPHIL NFR BLD: 3 % (ref 0–7)
ERYTHROCYTE [DISTWIDTH] IN BLOOD BY AUTOMATED COUNT: 12.5 % (ref 11.5–14.5)
GLUCOSE SERPL-MCNC: 94 MG/DL (ref 65–100)
HCT VFR BLD AUTO: 40.4 % (ref 36.6–50.3)
HGB BLD-MCNC: 13.2 G/DL (ref 12.1–17)
IMM GRANULOCYTES # BLD AUTO: 0.1 K/UL (ref 0–0.04)
IMM GRANULOCYTES NFR BLD AUTO: 1 % (ref 0–0.5)
LYMPHOCYTES # BLD: 1.8 K/UL (ref 0.8–3.5)
LYMPHOCYTES NFR BLD: 18 % (ref 12–49)
MCH RBC QN AUTO: 31.5 PG (ref 26–34)
MCHC RBC AUTO-ENTMCNC: 32.7 G/DL (ref 30–36.5)
MCV RBC AUTO: 96.4 FL (ref 80–99)
MONOCYTES # BLD: 0.8 K/UL (ref 0–1)
MONOCYTES NFR BLD: 8 % (ref 5–13)
NEUTS SEG # BLD: 7 K/UL (ref 1.8–8)
NEUTS SEG NFR BLD: 69 % (ref 32–75)
NRBC # BLD: 0 K/UL (ref 0–0.01)
NRBC BLD-RTO: 0 PER 100 WBC
PLATELET # BLD AUTO: 180 K/UL (ref 150–400)
PMV BLD AUTO: 12.3 FL (ref 8.9–12.9)
POTASSIUM SERPL-SCNC: 4.3 MMOL/L (ref 3.5–5.1)
RBC # BLD AUTO: 4.19 M/UL (ref 4.1–5.7)
SODIUM SERPL-SCNC: 140 MMOL/L (ref 136–145)
WBC # BLD AUTO: 10 K/UL (ref 4.1–11.1)

## 2024-02-15 PROCEDURE — 97535 SELF CARE MNGMENT TRAINING: CPT

## 2024-02-15 PROCEDURE — 2580000003 HC RX 258: Performed by: STUDENT IN AN ORGANIZED HEALTH CARE EDUCATION/TRAINING PROGRAM

## 2024-02-15 PROCEDURE — 36415 COLL VENOUS BLD VENIPUNCTURE: CPT

## 2024-02-15 PROCEDURE — 1100000003 HC PRIVATE W/ TELEMETRY

## 2024-02-15 PROCEDURE — 6370000000 HC RX 637 (ALT 250 FOR IP): Performed by: INTERNAL MEDICINE

## 2024-02-15 PROCEDURE — 94640 AIRWAY INHALATION TREATMENT: CPT

## 2024-02-15 PROCEDURE — 97116 GAIT TRAINING THERAPY: CPT

## 2024-02-15 PROCEDURE — 80048 BASIC METABOLIC PNL TOTAL CA: CPT

## 2024-02-15 PROCEDURE — 85025 COMPLETE CBC W/AUTO DIFF WBC: CPT

## 2024-02-15 PROCEDURE — 6370000000 HC RX 637 (ALT 250 FOR IP): Performed by: STUDENT IN AN ORGANIZED HEALTH CARE EDUCATION/TRAINING PROGRAM

## 2024-02-15 PROCEDURE — 6360000002 HC RX W HCPCS: Performed by: STUDENT IN AN ORGANIZED HEALTH CARE EDUCATION/TRAINING PROGRAM

## 2024-02-15 RX ORDER — ATORVASTATIN CALCIUM 40 MG/1
40 TABLET, FILM COATED ORAL NIGHTLY
Status: DISCONTINUED | OUTPATIENT
Start: 2024-02-15 | End: 2024-02-20 | Stop reason: HOSPADM

## 2024-02-15 RX ORDER — ENOXAPARIN SODIUM 100 MG/ML
40 INJECTION SUBCUTANEOUS DAILY
Status: DISCONTINUED | OUTPATIENT
Start: 2024-02-15 | End: 2024-02-20 | Stop reason: HOSPADM

## 2024-02-15 RX ADMIN — SODIUM CHLORIDE, PRESERVATIVE FREE 10 ML: 5 INJECTION INTRAVENOUS at 08:34

## 2024-02-15 RX ADMIN — SODIUM CHLORIDE, PRESERVATIVE FREE 10 ML: 5 INJECTION INTRAVENOUS at 08:33

## 2024-02-15 RX ADMIN — AMLODIPINE BESYLATE 10 MG: 5 TABLET ORAL at 08:31

## 2024-02-15 RX ADMIN — IPRATROPIUM BROMIDE 0.5 MG: 0.5 SOLUTION RESPIRATORY (INHALATION) at 07:40

## 2024-02-15 RX ADMIN — ATORVASTATIN CALCIUM 40 MG: 40 TABLET, FILM COATED ORAL at 21:22

## 2024-02-15 RX ADMIN — LOSARTAN POTASSIUM 100 MG: 100 TABLET, FILM COATED ORAL at 08:31

## 2024-02-15 RX ADMIN — ASPIRIN 81 MG: 81 TABLET, COATED ORAL at 08:31

## 2024-02-15 RX ADMIN — PAROXETINE HYDROCHLORIDE 20 MG: 20 TABLET, FILM COATED ORAL at 08:31

## 2024-02-15 RX ADMIN — SODIUM CHLORIDE, PRESERVATIVE FREE 10 ML: 5 INJECTION INTRAVENOUS at 21:23

## 2024-02-15 RX ADMIN — ARFORMOTEROL TARTRATE 15 MCG: 15 SOLUTION RESPIRATORY (INHALATION) at 07:47

## 2024-02-15 NOTE — CONSULTS
STUDIES, I reviewed:    Telemetry:  SINUS RHYTHM.    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    Invalid input(s): \"CPKMB\", \"CKNDX\", \"TROIQ\"  No results found for: \"CHOL\", \"CHOLX\", \"CHLST\", \"CHOLV\", \"HDL\", \"HDLC\", \"LDL\", \"LDLC\", \"TGLX\", \"TRIGL\"  No results for input(s): \"INR\", \"APTT\" in the last 72 hours.    Invalid input(s): \"PTP\"   Recent Labs     02/13/24  0314 02/14/24  0322 02/15/24  0416    142 140   K 4.3 4.4 4.3    111* 108   CO2 30 28 27   BUN 27* 24* 18   WBC 10.5 10.5 10.0   HGB 13.5 12.2 13.2   HCT 41.0 37.2 40.4    168 180     Estimated Creatinine Clearance: 54 mL/min (based on SCr of 1.17 mg/dL).      No results for input(s): \"TP\", \"ALB\", \"GLOB\", \"GGT\", \"AML\" in the last 72 hours.    Invalid input(s): \"SGOT\", \"GPT\", \"AP\", \"TBIL\", \"AMYP\", \"LPSE\", \"HLPSE\"  No components found for: \"GLPOC\"  No results for input(s): \"PH\", \"PCO2\", \"PO2\" in the last 72 hours.      Mark Aldrich MD 02/15/24 8:38 AM     
Disease         Angioplasty     Hyperlipidemia         neck surgery     Hypertension       Diabetes       GERD           Obstetric History  Pregnancy not pertinent.       Family History   (Reviewed, updated)    Relationship Family Member Name  Age at Death Condition Onset Age Cause of Death   Father  Y    N   Father    coronary arteriosclerosis  N   Father    CHF  N   Father  Y   at 70 of MI  Y   Mother  Y    N   Mother  Y   72 of CHF w/ HTN, DM  Y   Mother    coronary arteriosclerosis  N   Sister  N  HTN, DM  N     Social History  (Reviewed, updated)  Tobacco use reviewed.    Preferred language is English.      Marital Status/Family/Social Support  Marital status: Single     Tobacco use status: Smoker.    Smoking status: Current every day smoker.    Tobacco Screening  Patient has used tobacco. Patient has used tobacco in the last 30 days.     Smoking Status  Type Smoking Status Usage Per Day Years Used Pack Years Total Pack Years   Cigarette Current every day smoker         Tobacco Cessation Information  Date Counseled By Order Status Description Code Tobacco Cessation Information   03/15/2019 Yana Mackay Tobacco cessation counseling completed   Smoking effects education   2020 Renato Ruiz Tobacco cessation counseling completed   Tobacco Cessation Counseling   2020 Yana Mackay Tobacco cessation counseling completed   Smoking effects education   2021 Yana Mackay Tobacco cessation counseling completed   Smoking effects education   2021 Yana Mackay CMA Tobacco cessation counseling completed   Smoking cessation education       Alcohol  There is a history of alcohol use.    consumed occasionally.    Caffeine  The patient uses caffeine: coffee.    Lifestyle  Moderate activity level.  Exercises occasionally.    Diet  1500 calorie.    REVIEW OF SYMPTOMS:  See HPI    Vital Signs     Time BP mm/Hg Pulse /min Resp /min Temp F Ht ft Ht in Ht cm Wt lb Wt kg BMI kg/m2 BSA

## 2024-02-16 ENCOUNTER — ANESTHESIA EVENT (OUTPATIENT)
Facility: HOSPITAL | Age: 79
DRG: 277 | End: 2024-02-16
Payer: MEDICARE

## 2024-02-16 ENCOUNTER — ANESTHESIA (OUTPATIENT)
Facility: HOSPITAL | Age: 79
DRG: 277 | End: 2024-02-16
Payer: MEDICARE

## 2024-02-16 DIAGNOSIS — I50.22 CHRONIC SYSTOLIC HEART FAILURE (HCC): Primary | ICD-10-CM

## 2024-02-16 LAB
ANION GAP SERPL CALC-SCNC: 3 MMOL/L (ref 5–15)
BUN SERPL-MCNC: 21 MG/DL (ref 6–20)
BUN/CREAT SERPL: 16 (ref 12–20)
CALCIUM SERPL-MCNC: 9 MG/DL (ref 8.5–10.1)
CHLORIDE SERPL-SCNC: 109 MMOL/L (ref 97–108)
CO2 SERPL-SCNC: 28 MMOL/L (ref 21–32)
CREAT SERPL-MCNC: 1.3 MG/DL (ref 0.7–1.3)
GLUCOSE SERPL-MCNC: 109 MG/DL (ref 65–100)
POTASSIUM SERPL-SCNC: 4.3 MMOL/L (ref 3.5–5.1)
SODIUM SERPL-SCNC: 140 MMOL/L (ref 136–145)

## 2024-02-16 PROCEDURE — 94640 AIRWAY INHALATION TREATMENT: CPT

## 2024-02-16 PROCEDURE — 2580000003 HC RX 258: Performed by: STUDENT IN AN ORGANIZED HEALTH CARE EDUCATION/TRAINING PROGRAM

## 2024-02-16 PROCEDURE — 6360000002 HC RX W HCPCS: Performed by: STUDENT IN AN ORGANIZED HEALTH CARE EDUCATION/TRAINING PROGRAM

## 2024-02-16 PROCEDURE — 36415 COLL VENOUS BLD VENIPUNCTURE: CPT

## 2024-02-16 PROCEDURE — 6370000000 HC RX 637 (ALT 250 FOR IP): Performed by: STUDENT IN AN ORGANIZED HEALTH CARE EDUCATION/TRAINING PROGRAM

## 2024-02-16 PROCEDURE — 80048 BASIC METABOLIC PNL TOTAL CA: CPT

## 2024-02-16 PROCEDURE — 6370000000 HC RX 637 (ALT 250 FOR IP): Performed by: INTERNAL MEDICINE

## 2024-02-16 PROCEDURE — 1100000003 HC PRIVATE W/ TELEMETRY

## 2024-02-16 RX ADMIN — ATORVASTATIN CALCIUM 40 MG: 40 TABLET, FILM COATED ORAL at 21:50

## 2024-02-16 RX ADMIN — AMLODIPINE BESYLATE 10 MG: 5 TABLET ORAL at 08:39

## 2024-02-16 RX ADMIN — SODIUM CHLORIDE, PRESERVATIVE FREE 10 ML: 5 INJECTION INTRAVENOUS at 08:39

## 2024-02-16 RX ADMIN — ARFORMOTEROL TARTRATE 15 MCG: 15 SOLUTION RESPIRATORY (INHALATION) at 19:28

## 2024-02-16 RX ADMIN — ARFORMOTEROL TARTRATE 15 MCG: 15 SOLUTION RESPIRATORY (INHALATION) at 07:22

## 2024-02-16 RX ADMIN — ASPIRIN 81 MG: 81 TABLET, COATED ORAL at 08:39

## 2024-02-16 RX ADMIN — IPRATROPIUM BROMIDE 0.5 MG: 0.5 SOLUTION RESPIRATORY (INHALATION) at 19:28

## 2024-02-16 RX ADMIN — IPRATROPIUM BROMIDE 0.5 MG: 0.5 SOLUTION RESPIRATORY (INHALATION) at 07:22

## 2024-02-16 RX ADMIN — LOSARTAN POTASSIUM 100 MG: 100 TABLET, FILM COATED ORAL at 08:39

## 2024-02-16 RX ADMIN — SODIUM CHLORIDE, PRESERVATIVE FREE 10 ML: 5 INJECTION INTRAVENOUS at 08:40

## 2024-02-16 RX ADMIN — PAROXETINE HYDROCHLORIDE 20 MG: 20 TABLET, FILM COATED ORAL at 08:39

## 2024-02-16 NOTE — DISCHARGE INSTRUCTIONS
diet    Recommended activity: activity as tolerated    If you have questions regarding the hospital related prescriptions or hospital related issues please call at .    If you experience any of the following symptoms then please call your primary care physician or return to the emergency room if you cannot get hold of your doctor:  Fever, chills, nausea, vomiting, diarrhea, change in mentation, falling, bleeding, shortness of breath,     Follow Up:  PCP you are to call and set up an appointment to see them in 7-10 days.  Dr Fay (cardiology) in 2 weeks as recommended       Information obtained by :  I understand that if any problems occur once I am at home I am to contact my physician.    I understand and acknowledge receipt of the instructions indicated above.                                                                                                                                           Physician's or R.N.'s Signature                                                                  Date/Time                                                                                                                                              Patient or Representative Signature                                                          Date/Time  Smoking Cessation Program: This is a free, phone/web based, smoking cessation program. The program is individualized to meet each patient's needs. To enroll use the link - www.[email or web address].org or call 6-789-JXIOAEP ([phone]).        DISCHARGE INSTRUCTIONS FOR THE ICD    1. Remember to call for an appointment in 2-4 weeks [phone] to check healing and implant programming with Dr. Aldrich's nurse, GRIFFIN.  This is a SEPARATE appointment from seeing your regular cardiologist, Dr. Bass.  2. Medic Alert Bracelets are available from your pharmacist to wear at all times if you choose to wear one.  3. Carry your ID card for your ICD with you at all times.  This

## 2024-02-16 NOTE — CARDIO/PULMONARY
Chart reviewed: Patient is 78 y.o. male admitted with Syncope and collapse [R55]  Hyperglycemia [R73.9]  Elevated serum creatinine [R79.89]. Most recent EF is EF: 20-25%  on 02/12/24 per Echo.    Education: Living with Heart Failure Booklet given to Anshu Bonilla Jr..      Patient was not interested in doing heart failure education at this time due to fatigue and planned procedure this afternoon. HF book left at bedside and advised patient to call us if any questions arise. CP rehab will attempt to see at a later time for HF education as patient allows.     Smoking history assessed.  Patient is a smoker. Smoking Cessation Program link added to the AVS.       ELAINA KWOK RN

## 2024-02-17 LAB
ANION GAP SERPL CALC-SCNC: 4 MMOL/L (ref 5–15)
BUN SERPL-MCNC: 20 MG/DL (ref 6–20)
BUN/CREAT SERPL: 17 (ref 12–20)
CALCIUM SERPL-MCNC: 8.3 MG/DL (ref 8.5–10.1)
CHLORIDE SERPL-SCNC: 108 MMOL/L (ref 97–108)
CO2 SERPL-SCNC: 27 MMOL/L (ref 21–32)
CREAT SERPL-MCNC: 1.21 MG/DL (ref 0.7–1.3)
GLUCOSE SERPL-MCNC: 101 MG/DL (ref 65–100)
POTASSIUM SERPL-SCNC: 4 MMOL/L (ref 3.5–5.1)
SODIUM SERPL-SCNC: 139 MMOL/L (ref 136–145)

## 2024-02-17 PROCEDURE — 6370000000 HC RX 637 (ALT 250 FOR IP): Performed by: STUDENT IN AN ORGANIZED HEALTH CARE EDUCATION/TRAINING PROGRAM

## 2024-02-17 PROCEDURE — 2580000003 HC RX 258: Performed by: STUDENT IN AN ORGANIZED HEALTH CARE EDUCATION/TRAINING PROGRAM

## 2024-02-17 PROCEDURE — 1100000003 HC PRIVATE W/ TELEMETRY

## 2024-02-17 PROCEDURE — 6360000002 HC RX W HCPCS: Performed by: STUDENT IN AN ORGANIZED HEALTH CARE EDUCATION/TRAINING PROGRAM

## 2024-02-17 PROCEDURE — 36415 COLL VENOUS BLD VENIPUNCTURE: CPT

## 2024-02-17 PROCEDURE — 94640 AIRWAY INHALATION TREATMENT: CPT

## 2024-02-17 PROCEDURE — 80048 BASIC METABOLIC PNL TOTAL CA: CPT

## 2024-02-17 PROCEDURE — 6370000000 HC RX 637 (ALT 250 FOR IP): Performed by: INTERNAL MEDICINE

## 2024-02-17 RX ADMIN — IPRATROPIUM BROMIDE 0.5 MG: 0.5 SOLUTION RESPIRATORY (INHALATION) at 08:14

## 2024-02-17 RX ADMIN — ATORVASTATIN CALCIUM 40 MG: 40 TABLET, FILM COATED ORAL at 20:45

## 2024-02-17 RX ADMIN — SODIUM CHLORIDE, PRESERVATIVE FREE 10 ML: 5 INJECTION INTRAVENOUS at 09:08

## 2024-02-17 RX ADMIN — SODIUM CHLORIDE, PRESERVATIVE FREE 10 ML: 5 INJECTION INTRAVENOUS at 09:07

## 2024-02-17 RX ADMIN — ASPIRIN 81 MG: 81 TABLET, COATED ORAL at 08:21

## 2024-02-17 RX ADMIN — AMLODIPINE BESYLATE 10 MG: 5 TABLET ORAL at 12:13

## 2024-02-17 RX ADMIN — PAROXETINE HYDROCHLORIDE 20 MG: 20 TABLET, FILM COATED ORAL at 08:21

## 2024-02-17 RX ADMIN — LOSARTAN POTASSIUM 100 MG: 100 TABLET, FILM COATED ORAL at 08:21

## 2024-02-17 RX ADMIN — ARFORMOTEROL TARTRATE 15 MCG: 15 SOLUTION RESPIRATORY (INHALATION) at 08:14

## 2024-02-18 PROCEDURE — 94640 AIRWAY INHALATION TREATMENT: CPT

## 2024-02-18 PROCEDURE — 6370000000 HC RX 637 (ALT 250 FOR IP): Performed by: STUDENT IN AN ORGANIZED HEALTH CARE EDUCATION/TRAINING PROGRAM

## 2024-02-18 PROCEDURE — 2580000003 HC RX 258: Performed by: STUDENT IN AN ORGANIZED HEALTH CARE EDUCATION/TRAINING PROGRAM

## 2024-02-18 PROCEDURE — 6360000002 HC RX W HCPCS: Performed by: STUDENT IN AN ORGANIZED HEALTH CARE EDUCATION/TRAINING PROGRAM

## 2024-02-18 PROCEDURE — 6370000000 HC RX 637 (ALT 250 FOR IP): Performed by: INTERNAL MEDICINE

## 2024-02-18 PROCEDURE — 1100000003 HC PRIVATE W/ TELEMETRY

## 2024-02-18 PROCEDURE — 6360000002 HC RX W HCPCS: Performed by: INTERNAL MEDICINE

## 2024-02-18 RX ORDER — ARFORMOTEROL TARTRATE 15 UG/2ML
15 SOLUTION RESPIRATORY (INHALATION)
Status: DISCONTINUED | OUTPATIENT
Start: 2024-02-19 | End: 2024-02-19

## 2024-02-18 RX ADMIN — SODIUM CHLORIDE, PRESERVATIVE FREE 10 ML: 5 INJECTION INTRAVENOUS at 08:21

## 2024-02-18 RX ADMIN — ENOXAPARIN SODIUM 40 MG: 100 INJECTION SUBCUTANEOUS at 08:20

## 2024-02-18 RX ADMIN — PAROXETINE HYDROCHLORIDE 20 MG: 20 TABLET, FILM COATED ORAL at 08:19

## 2024-02-18 RX ADMIN — IPRATROPIUM BROMIDE 0.5 MG: 0.5 SOLUTION RESPIRATORY (INHALATION) at 08:00

## 2024-02-18 RX ADMIN — LOSARTAN POTASSIUM 100 MG: 100 TABLET, FILM COATED ORAL at 08:19

## 2024-02-18 RX ADMIN — ASPIRIN 81 MG: 81 TABLET, COATED ORAL at 08:19

## 2024-02-18 RX ADMIN — ARFORMOTEROL TARTRATE 15 MCG: 15 SOLUTION RESPIRATORY (INHALATION) at 08:00

## 2024-02-18 RX ADMIN — ATORVASTATIN CALCIUM 40 MG: 40 TABLET, FILM COATED ORAL at 21:22

## 2024-02-18 RX ADMIN — AMLODIPINE BESYLATE 10 MG: 5 TABLET ORAL at 08:19

## 2024-02-19 LAB — ECHO BSA: 1.89 M2

## 2024-02-19 PROCEDURE — C1777 LEAD, AICD, ENDO SINGLE COIL: HCPCS | Performed by: INTERNAL MEDICINE

## 2024-02-19 PROCEDURE — 2060000000 HC ICU INTERMEDIATE R&B

## 2024-02-19 PROCEDURE — 2709999900 HC NON-CHARGEABLE SUPPLY: Performed by: INTERNAL MEDICINE

## 2024-02-19 PROCEDURE — 0JH608Z INSERTION OF DEFIBRILLATOR GENERATOR INTO CHEST SUBCUTANEOUS TISSUE AND FASCIA, OPEN APPROACH: ICD-10-PCS | Performed by: INTERNAL MEDICINE

## 2024-02-19 PROCEDURE — C1892 INTRO/SHEATH,FIXED,PEEL-AWAY: HCPCS | Performed by: INTERNAL MEDICINE

## 2024-02-19 PROCEDURE — 6370000000 HC RX 637 (ALT 250 FOR IP): Performed by: STUDENT IN AN ORGANIZED HEALTH CARE EDUCATION/TRAINING PROGRAM

## 2024-02-19 PROCEDURE — A4217 STERILE WATER/SALINE, 500 ML: HCPCS | Performed by: INTERNAL MEDICINE

## 2024-02-19 PROCEDURE — 6370000000 HC RX 637 (ALT 250 FOR IP): Performed by: INTERNAL MEDICINE

## 2024-02-19 PROCEDURE — C1898 LEAD, PMKR, OTHER THAN TRANS: HCPCS | Performed by: INTERNAL MEDICINE

## 2024-02-19 PROCEDURE — 2500000003 HC RX 250 WO HCPCS: Performed by: INTERNAL MEDICINE

## 2024-02-19 PROCEDURE — 33249 INSJ/RPLCMT DEFIB W/LEAD(S): CPT | Performed by: INTERNAL MEDICINE

## 2024-02-19 PROCEDURE — 99152 MOD SED SAME PHYS/QHP 5/>YRS: CPT | Performed by: INTERNAL MEDICINE

## 2024-02-19 PROCEDURE — 02HK3KZ INSERTION OF DEFIBRILLATOR LEAD INTO RIGHT VENTRICLE, PERCUTANEOUS APPROACH: ICD-10-PCS | Performed by: INTERNAL MEDICINE

## 2024-02-19 PROCEDURE — 6360000002 HC RX W HCPCS: Performed by: INTERNAL MEDICINE

## 2024-02-19 PROCEDURE — 2580000003 HC RX 258: Performed by: INTERNAL MEDICINE

## 2024-02-19 PROCEDURE — 2580000003 HC RX 258: Performed by: STUDENT IN AN ORGANIZED HEALTH CARE EDUCATION/TRAINING PROGRAM

## 2024-02-19 PROCEDURE — C1894 INTRO/SHEATH, NON-LASER: HCPCS | Performed by: INTERNAL MEDICINE

## 2024-02-19 PROCEDURE — 99153 MOD SED SAME PHYS/QHP EA: CPT | Performed by: INTERNAL MEDICINE

## 2024-02-19 PROCEDURE — C1721 AICD, DUAL CHAMBER: HCPCS | Performed by: INTERNAL MEDICINE

## 2024-02-19 PROCEDURE — 02H63KZ INSERTION OF DEFIBRILLATOR LEAD INTO RIGHT ATRIUM, PERCUTANEOUS APPROACH: ICD-10-PCS | Performed by: INTERNAL MEDICINE

## 2024-02-19 DEVICE — INTEGRATED BIPOLAR PACE/SENSE AND DEFIBRILLATION LEAD
Type: IMPLANTABLE DEVICE | Status: FUNCTIONAL
Brand: RELIANCE 4-FRONT™

## 2024-02-19 DEVICE — PACE/SENSE LEAD
Type: IMPLANTABLE DEVICE | Status: FUNCTIONAL
Brand: INGEVITY™+

## 2024-02-19 DEVICE — IMPLANTABLE CARDIOVERTER DEFIBRILLATOR DR
Type: IMPLANTABLE DEVICE | Status: FUNCTIONAL
Brand: VIGILANT™ EL ICD DR

## 2024-02-19 RX ORDER — FENTANYL CITRATE 50 UG/ML
INJECTION, SOLUTION INTRAMUSCULAR; INTRAVENOUS PRN
Status: DISCONTINUED | OUTPATIENT
Start: 2024-02-19 | End: 2024-02-19 | Stop reason: HOSPADM

## 2024-02-19 RX ORDER — MIDAZOLAM HYDROCHLORIDE 1 MG/ML
INJECTION INTRAMUSCULAR; INTRAVENOUS PRN
Status: DISCONTINUED | OUTPATIENT
Start: 2024-02-19 | End: 2024-02-19 | Stop reason: HOSPADM

## 2024-02-19 RX ORDER — ARFORMOTEROL TARTRATE 15 UG/2ML
15 SOLUTION RESPIRATORY (INHALATION) 2 TIMES DAILY PRN
Status: DISCONTINUED | OUTPATIENT
Start: 2024-02-19 | End: 2024-02-20 | Stop reason: HOSPADM

## 2024-02-19 RX ORDER — LIDOCAINE HYDROCHLORIDE AND EPINEPHRINE BITARTRATE 20; .01 MG/ML; MG/ML
INJECTION, SOLUTION SUBCUTANEOUS PRN
Status: DISCONTINUED | OUTPATIENT
Start: 2024-02-19 | End: 2024-02-19 | Stop reason: HOSPADM

## 2024-02-19 RX ORDER — HEPARIN SODIUM 10000 [USP'U]/ML
INJECTION, SOLUTION INTRAVENOUS; SUBCUTANEOUS PRN
Status: DISCONTINUED | OUTPATIENT
Start: 2024-02-19 | End: 2024-02-19 | Stop reason: HOSPADM

## 2024-02-19 RX ADMIN — AMLODIPINE BESYLATE 10 MG: 5 TABLET ORAL at 08:48

## 2024-02-19 RX ADMIN — ASPIRIN 81 MG: 81 TABLET, COATED ORAL at 08:48

## 2024-02-19 RX ADMIN — PAROXETINE HYDROCHLORIDE 20 MG: 20 TABLET, FILM COATED ORAL at 08:48

## 2024-02-19 RX ADMIN — LOSARTAN POTASSIUM 100 MG: 100 TABLET, FILM COATED ORAL at 08:48

## 2024-02-19 RX ADMIN — ATORVASTATIN CALCIUM 40 MG: 40 TABLET, FILM COATED ORAL at 22:05

## 2024-02-19 RX ADMIN — SODIUM CHLORIDE, PRESERVATIVE FREE 10 ML: 5 INJECTION INTRAVENOUS at 08:50

## 2024-02-19 RX ADMIN — SODIUM CHLORIDE, PRESERVATIVE FREE 10 ML: 5 INJECTION INTRAVENOUS at 22:05

## 2024-02-19 ASSESSMENT — PAIN SCALES - GENERAL
PAINLEVEL_OUTOF10: 0
PAINLEVEL_OUTOF10: 0

## 2024-02-20 VITALS
HEIGHT: 71 IN | HEART RATE: 74 BPM | RESPIRATION RATE: 16 BRPM | TEMPERATURE: 98.4 F | OXYGEN SATURATION: 98 % | WEIGHT: 158.07 LBS | BODY MASS INDEX: 22.13 KG/M2 | DIASTOLIC BLOOD PRESSURE: 74 MMHG | SYSTOLIC BLOOD PRESSURE: 111 MMHG

## 2024-02-20 PROCEDURE — 6360000002 HC RX W HCPCS: Performed by: INTERNAL MEDICINE

## 2024-02-20 PROCEDURE — 97530 THERAPEUTIC ACTIVITIES: CPT

## 2024-02-20 PROCEDURE — 6370000000 HC RX 637 (ALT 250 FOR IP): Performed by: STUDENT IN AN ORGANIZED HEALTH CARE EDUCATION/TRAINING PROGRAM

## 2024-02-20 PROCEDURE — 2580000003 HC RX 258: Performed by: STUDENT IN AN ORGANIZED HEALTH CARE EDUCATION/TRAINING PROGRAM

## 2024-02-20 RX ORDER — ATORVASTATIN CALCIUM 40 MG/1
40 TABLET, FILM COATED ORAL NIGHTLY
Qty: 30 TABLET | Refills: 0 | Status: SHIPPED | OUTPATIENT
Start: 2024-02-20

## 2024-02-20 RX ORDER — LOSARTAN POTASSIUM 100 MG/1
100 TABLET ORAL DAILY
Qty: 30 TABLET | Refills: 0 | Status: SHIPPED | OUTPATIENT
Start: 2024-02-20

## 2024-02-20 RX ORDER — METOPROLOL SUCCINATE 25 MG/1
25 TABLET, EXTENDED RELEASE ORAL DAILY
Status: DISCONTINUED | OUTPATIENT
Start: 2024-02-21 | End: 2024-02-20 | Stop reason: HOSPADM

## 2024-02-20 RX ORDER — METOPROLOL SUCCINATE 25 MG/1
25 TABLET, EXTENDED RELEASE ORAL DAILY
Qty: 30 TABLET | Refills: 0 | Status: SHIPPED | OUTPATIENT
Start: 2024-02-21

## 2024-02-20 RX ADMIN — ENOXAPARIN SODIUM 40 MG: 100 INJECTION SUBCUTANEOUS at 08:37

## 2024-02-20 RX ADMIN — SODIUM CHLORIDE, PRESERVATIVE FREE 10 ML: 5 INJECTION INTRAVENOUS at 08:40

## 2024-02-20 RX ADMIN — ACETAMINOPHEN 650 MG: 325 TABLET ORAL at 08:36

## 2024-02-20 RX ADMIN — ASPIRIN 81 MG: 81 TABLET, COATED ORAL at 08:37

## 2024-02-20 RX ADMIN — PAROXETINE HYDROCHLORIDE 20 MG: 20 TABLET, FILM COATED ORAL at 08:36

## 2024-02-20 RX ADMIN — LOSARTAN POTASSIUM 100 MG: 100 TABLET, FILM COATED ORAL at 08:37

## 2024-02-20 RX ADMIN — AMLODIPINE BESYLATE 10 MG: 5 TABLET ORAL at 08:37

## 2024-02-20 RX ADMIN — METOPROLOL SUCCINATE 12.5 MG: 25 TABLET, EXTENDED RELEASE ORAL at 08:37

## 2024-02-20 ASSESSMENT — PAIN SCALES - GENERAL: PAINLEVEL_OUTOF10: 0

## 2024-02-20 NOTE — PLAN OF CARE
Problem: Occupational Therapy - Adult  Goal: By Discharge: Performs self-care activities at highest level of function for planned discharge setting.  See evaluation for individualized goals.  Description: FUNCTIONAL STATUS PRIOR TO ADMISSION:    Receives Help From: Other (comment) (rent a room situation), ADL Assistance:  (room mate says he would not get out of bed; patient says he needed no help),  ,  ,  ,  ,  , Homemaking Assistance:  (hired help 1x/month),  ,  , Active : No     HOME SUPPORT: Patient lived in home where residents assisted each other with IADLs, he is reportedly Mod I ADLs, but needs vc for medicines \"sometimes I forget and they tell me so\"  Home owner contacted  stating patient has been refusing to get out of bed, refusing to do ADLs and not walking around; does smoke ~ 1.5 packs of cigarettes daily. Patient in incontinent bed of urine upon approach; he stated he did realize it, but had not notified staff.    Occupational Therapy Goals:  Initiated 2/11/2024  1.  Patient will perform grooming in standing VSS  with Stand by Assist within 7 day(s).  2.  Patient will perform bathing with Supervision within 7 day(s).  3.  Patient will perform upper body dressing and lower body dressing with Stand by Assist within 7 day(s).  4.  Patient will perform toilet transfers with Stand by Assist  within 7 day(s).  5.  Patient will perform all aspects of toileting with Stand by Assist within 7 day(s).  6.  Patient will participate in upper extremity therapeutic exercise/activities with Supervision for 5 minutes within 7 day(s).    7.  Patient will utilize energy conservation, fall prevention techniques during functional activities with verbal cues within 7 day(s).   Outcome: Progressing   OCCUPATIONAL THERAPY EVALUATION    Patient: Anshu Bonilla JrHoang (78 y.o. male)  Date: 2/11/2024  Primary Diagnosis: Syncope and collapse [R55]  Hyperglycemia [R73.9]  Elevated serum creatinine [R79.89]     
  Problem: Physical Therapy - Adult  Goal: By Discharge: Performs mobility at highest level of function for planned discharge setting.  See evaluation for individualized goals.  Description: FUNCTIONAL STATUS PRIOR TO ADMISSION: Patient was modified independent using a rolling walker, single point cane, and no AD for functional mobility.    HOME SUPPORT PRIOR TO ADMISSION: The patient lived with roommates, performed ADLs independently and home had hired help for laundry/cleaning. Pt's roommate reported worsened self-care despite encouragement     Physical Therapy Goals  Initiated 2/11/2024  1.  Patient will move from supine to sit and sit to supine, scoot up and down, and roll side to side in bed with modified independence within 7 day(s).    2.  Patient will perform sit to stand with modified independence within 7 day(s).  3.  Patient will transfer from bed to chair and chair to bed with modified independence using the least restrictive device within 7 day(s).  4.  Patient will ambulate with modified independence for 100 feet with the least restrictive device within 7 day(s).     2/20/2024 1217 by Kari Hickey PT  Outcome: Progressing     Problem: Risk for Elopement  Goal: Patient will not exit the unit/facility without proper excort  2/20/2024 1423 by Mariana Platt RN  Outcome: Adequate for Discharge  2/20/2024 0542 by Kelle Mackay RN  Outcome: Progressing     Problem: Respiratory - Adult  Goal: Achieves optimal ventilation and oxygenation  2/20/2024 1423 by Mariana Platt, RN  Outcome: Adequate for Discharge  2/20/2024 0542 by Kelle Mackay RN  Outcome: Progressing     Problem: Skin/Tissue Integrity  Goal: Absence of new skin breakdown  Description: 1.  Monitor for areas of redness and/or skin breakdown  2.  Assess vascular access sites hourly  3.  Every 4-6 hours minimum:  Change oxygen saturation probe site  4.  Every 4-6 hours:  If on nasal continuous positive airway 
  Problem: Physical Therapy - Adult  Goal: By Discharge: Performs mobility at highest level of function for planned discharge setting.  See evaluation for individualized goals.  Description: FUNCTIONAL STATUS PRIOR TO ADMISSION: Patient was modified independent using a rolling walker, single point cane, and no AD for functional mobility.    HOME SUPPORT PRIOR TO ADMISSION: The patient lived with roommates, performed ADLs independently and home had hired help for laundry/cleaning. Pt's roommate reported worsened self-care despite encouragement     Physical Therapy Goals  Initiated 2/11/2024  1.  Patient will move from supine to sit and sit to supine, scoot up and down, and roll side to side in bed with modified independence within 7 day(s).    2.  Patient will perform sit to stand with modified independence within 7 day(s).  3.  Patient will transfer from bed to chair and chair to bed with modified independence using the least restrictive device within 7 day(s).  4.  Patient will ambulate with modified independence for 100 feet with the least restrictive device within 7 day(s).     Outcome: Progressing     PHYSICAL THERAPY EVALUATION    Patient: Anshu Bonilla Jr. (78 y.o. male)  Date: 2/11/2024  Primary Diagnosis: Syncope and collapse [R55]  Hyperglycemia [R73.9]  Elevated serum creatinine [R79.89]       Precautions: Restrictions/Precautions: Fall Risk, General Precautions, Bed Alarm (10 minute syncope PTA)                      ASSESSMENT :   Pt seen for PT evaluation following admission for 10 minute syncopal episode with seizure-like presentation. Pt demos generalized weakness and impaired standing balance, noted with forward trunk lean in standing, and reaching for furniture while ambulating in room without AD. Pt required Min A for sit <> stand trials, Min A x2 to transfer to bedside chair 2/2 hx of syncope. Upon transfer to sit, pt with episode of emesis, RN made aware and further mobility deferred. 
  Problem: Physical Therapy - Adult  Goal: By Discharge: Performs mobility at highest level of function for planned discharge setting.  See evaluation for individualized goals.  Description: FUNCTIONAL STATUS PRIOR TO ADMISSION: Patient was modified independent using a rolling walker, single point cane, and no AD for functional mobility.    HOME SUPPORT PRIOR TO ADMISSION: The patient lived with roommates, performed ADLs independently and home had hired help for laundry/cleaning. Pt's roommate reported worsened self-care despite encouragement     Physical Therapy Goals  Initiated 2/11/2024  1.  Patient will move from supine to sit and sit to supine, scoot up and down, and roll side to side in bed with modified independence within 7 day(s).    2.  Patient will perform sit to stand with modified independence within 7 day(s).  3.  Patient will transfer from bed to chair and chair to bed with modified independence using the least restrictive device within 7 day(s).  4.  Patient will ambulate with modified independence for 100 feet with the least restrictive device within 7 day(s).     Outcome: Progressing     PHYSICAL THERAPY TREATMENT    Patient: Anshu Bonilla  (78 y.o. male)  Date: 2/15/2024  Diagnosis: Syncope and collapse [R55]  Hyperglycemia [R73.9]  Elevated serum creatinine [R79.89] Syncope and collapse  Procedure(s) (LRB):  Left heart cath / coronary angiography (N/A) 1 Day Post-Op  Precautions: Fall Risk, General Precautions, Bed Alarm (10 minute syncope PTA)                      ASSESSMENT:  Patient continues to benefit from skilled PT services and is slowly progressing towards goals. Received patient laying on his side in bed, nurse present in room to provide patient clean gown. Patient able to sit up from flat bed with supervision assist, utilized bed rail. Sitting balance intact. Performed functional transfers with standby assist, cuing for safe technique. Patient agreeable to get up into the chair, 
  Problem: Physical Therapy - Adult  Goal: By Discharge: Performs mobility at highest level of function for planned discharge setting.  See evaluation for individualized goals.  Description: FUNCTIONAL STATUS PRIOR TO ADMISSION: Patient was modified independent using a rolling walker, single point cane, and no AD for functional mobility.    HOME SUPPORT PRIOR TO ADMISSION: The patient lived with roommates, performed ADLs independently and home had hired help for laundry/cleaning. Pt's roommate reported worsened self-care despite encouragement     Physical Therapy Goals  Initiated 2/11/2024  1.  Patient will move from supine to sit and sit to supine, scoot up and down, and roll side to side in bed with modified independence within 7 day(s).    2.  Patient will perform sit to stand with modified independence within 7 day(s).  3.  Patient will transfer from bed to chair and chair to bed with modified independence using the least restrictive device within 7 day(s).  4.  Patient will ambulate with modified independence for 100 feet with the least restrictive device within 7 day(s).     Outcome: Progressing     PHYSICAL THERAPY TREATMENT    Patient: Anshu Bonilla  (78 y.o. male)  Date: 2/20/2024  Diagnosis: Syncope and collapse [R55]  Hyperglycemia [R73.9]  Elevated serum creatinine [R79.89] Syncope and collapse  Procedure(s) (LRB):  Insert ICD dual (N/A) 1 Day Post-Op  Precautions: Fall Risk, General Precautions, Bed Alarm (10 minute syncope PTA)                      ASSESSMENT:  Patient continues to benefit from skilled PT services and is slowly progressing towards goals. Received patient side-lying in bed. Cleared by nursing to mobilize. Required maximum encouragement to participate. Reviewed ICD precautions, patient only able to remember 1 on his own (not to raise arm overhead) and required constant cuing during functional tasks to maintain precautions. Declined to ambulate but agreeable to get up to chair. 
  Problem: Respiratory - Adult  Goal: Achieves optimal ventilation and oxygenation  Outcome: Progressing  Flowsheets (Taken 2/19/2024 1021)  Achieves optimal ventilation and oxygenation:   Assess for changes in respiratory status   Assess for changes in mentation and behavior     Problem: Skin/Tissue Integrity  Goal: Absence of new skin breakdown  Description: 1.  Monitor for areas of redness and/or skin breakdown  2.  Assess vascular access sites hourly  3.  Every 4-6 hours minimum:  Change oxygen saturation probe site  4.  Every 4-6 hours:  If on nasal continuous positive airway pressure, respiratory therapy assess nares and determine need for appliance change or resting period.  Outcome: Progressing     Problem: Safety - Adult  Goal: Free from fall injury  Outcome: Progressing  Flowsheets (Taken 2/19/2024 1021)  Free From Fall Injury: Instruct family/caregiver on patient safety     
  Problem: Risk for Elopement  Goal: Patient will not exit the unit/facility without proper excort  2/14/2024 0958 by Chucky Peterson RN  Outcome: Progressing  2/13/2024 2158 by Lorraine Watts RN  Outcome: Progressing     Problem: Respiratory - Adult  Goal: Achieves optimal ventilation and oxygenation  2/14/2024 0958 by Chucky Peterson RN  Outcome: Progressing  2/14/2024 0926 by Preethi Rhoades, RT  Outcome: Progressing  2/14/2024 0926 by Preethi Rhoades, RT  Outcome: Progressing  2/13/2024 2158 by Lorraine Watts RN  Outcome: Progressing     Problem: Skin/Tissue Integrity  Goal: Absence of new skin breakdown  Description: 1.  Monitor for areas of redness and/or skin breakdown  2.  Assess vascular access sites hourly  3.  Every 4-6 hours minimum:  Change oxygen saturation probe site  4.  Every 4-6 hours:  If on nasal continuous positive airway pressure, respiratory therapy assess nares and determine need for appliance change or resting period.  2/14/2024 0958 by Chucky Peterson RN  Outcome: Progressing  2/13/2024 2158 by Lorraine Watts RN  Outcome: Progressing     Problem: Safety - Adult  Goal: Free from fall injury  2/14/2024 0958 by Chucky Peterson RN  Outcome: Progressing  2/13/2024 2158 by Lorraine Watts RN  Outcome: Progressing     Problem: ABCDS Injury Assessment  Goal: Absence of physical injury  Outcome: Progressing     Problem: Discharge Planning  Goal: Discharge to home or other facility with appropriate resources  Outcome: Progressing     Problem: Pain  Goal: Verbalizes/displays adequate comfort level or baseline comfort level  Outcome: Progressing     
  Problem: Risk for Elopement  Goal: Patient will not exit the unit/facility without proper excort  Outcome: Progressing     Problem: Respiratory - Adult  Goal: Achieves optimal ventilation and oxygenation  2/17/2024 0858 by Michelle Zhou RN  Outcome: Progressing  2/16/2024 1930 by Matilda Saldana, RT  Outcome: Progressing     Problem: Skin/Tissue Integrity  Goal: Absence of new skin breakdown  Description: 1.  Monitor for areas of redness and/or skin breakdown  2.  Assess vascular access sites hourly  3.  Every 4-6 hours minimum:  Change oxygen saturation probe site  4.  Every 4-6 hours:  If on nasal continuous positive airway pressure, respiratory therapy assess nares and determine need for appliance change or resting period.  Outcome: Progressing     Problem: Safety - Adult  Goal: Free from fall injury  Outcome: Progressing     Problem: ABCDS Injury Assessment  Goal: Absence of physical injury  Outcome: Progressing     Problem: Discharge Planning  Goal: Discharge to home or other facility with appropriate resources  Outcome: Progressing     Problem: Pain  Goal: Verbalizes/displays adequate comfort level or baseline comfort level  Outcome: Progressing     
  Problem: Risk for Elopement  Goal: Patient will not exit the unit/facility without proper excort  Outcome: Progressing     Problem: Respiratory - Adult  Goal: Achieves optimal ventilation and oxygenation  2/18/2024 0830 by Michelle Zhou RN  Outcome: Progressing  2/18/2024 0802 by Antoine Lux, RT  Outcome: Progressing  2/17/2024 2113 by Matilda Saldana, RT  Outcome: Progressing     Problem: Skin/Tissue Integrity  Goal: Absence of new skin breakdown  Description: 1.  Monitor for areas of redness and/or skin breakdown  2.  Assess vascular access sites hourly  3.  Every 4-6 hours minimum:  Change oxygen saturation probe site  4.  Every 4-6 hours:  If on nasal continuous positive airway pressure, respiratory therapy assess nares and determine need for appliance change or resting period.  Outcome: Progressing     Problem: Safety - Adult  Goal: Free from fall injury  Outcome: Progressing     Problem: ABCDS Injury Assessment  Goal: Absence of physical injury  Outcome: Progressing     Problem: Discharge Planning  Goal: Discharge to home or other facility with appropriate resources  Outcome: Progressing     Problem: Pain  Goal: Verbalizes/displays adequate comfort level or baseline comfort level  Outcome: Progressing     
  Problem: Risk for Elopement  Goal: Patient will not exit the unit/facility without proper excort  Outcome: Progressing     Problem: Respiratory - Adult  Goal: Achieves optimal ventilation and oxygenation  Outcome: Progressing     Problem: Skin/Tissue Integrity  Goal: Absence of new skin breakdown  Description: 1.  Monitor for areas of redness and/or skin breakdown  2.  Assess vascular access sites hourly  3.  Every 4-6 hours minimum:  Change oxygen saturation probe site  4.  Every 4-6 hours:  If on nasal continuous positive airway pressure, respiratory therapy assess nares and determine need for appliance change or resting period.  Outcome: Progressing     Problem: Safety - Adult  Goal: Free from fall injury  Outcome: Progressing     Problem: ABCDS Injury Assessment  Goal: Absence of physical injury  Outcome: Progressing     Problem: Discharge Planning  Goal: Discharge to home or other facility with appropriate resources  Outcome: Progressing     Problem: Pain  Goal: Verbalizes/displays adequate comfort level or baseline comfort level  Outcome: Progressing     
Predictive Model Details          27 (Normal)  Factor Value    Calculated 2/13/2024 21:58 45% Age 78 years old    Deterioration Index Model 34% Neurological exam X     6% WBC count 10.5 K/uL     6% Potassium 4.3 mmol/L     5% Systolic 139     3% BUN abnormal (27 MG/DL)     0% Temperature 97.8 °F (36.6 °C)     0% Sodium 141 mmol/L     0% Pulse oximetry 95 %     0% Respiratory rate 16     0% Pulse 70     0% Hematocrit 41.0 %        Problem: Risk for Elopement  Goal: Patient will not exit the unit/facility without proper excort  Outcome: Progressing     Problem: Respiratory - Adult  Goal: Achieves optimal ventilation and oxygenation  2/13/2024 2158 by Lorraine Watts RN  Outcome: Progressing  2/13/2024 0935 by Abeba Mccullough, RT  Outcome: Progressing     Problem: Skin/Tissue Integrity  Goal: Absence of new skin breakdown  Description: 1.  Monitor for areas of redness and/or skin breakdown  2.  Assess vascular access sites hourly  3.  Every 4-6 hours minimum:  Change oxygen saturation probe site  4.  Every 4-6 hours:  If on nasal continuous positive airway pressure, respiratory therapy assess nares and determine need for appliance change or resting period.  Outcome: Progressing     Problem: Safety - Adult  Goal: Free from fall injury  Outcome: Progressing     
Predictive Model Details          33 (Caution)  Factor Value    Calculated 2/15/2024 00:02 37% Age 78 years old    Deterioration Index Model 28% Neurological exam X     12% Cardiac rhythm Sinus chrissy     6% Potassium 4.4 mmol/L     5% WBC count 10.5 K/uL     3% Pulse 52     3% Systolic 131     3% BUN abnormal (24 MG/DL)     2% Sodium 142 mmol/L     0% Hematocrit 37.2 %     0% Pulse oximetry 95 %     0% Respiratory rate 16     0% Temperature 98.2 °F (36.8 °C)        Problem: Risk for Elopement  Goal: Patient will not exit the unit/facility without proper excort  Outcome: Progressing     Problem: Respiratory - Adult  Goal: Achieves optimal ventilation and oxygenation  Outcome: Progressing     Problem: Skin/Tissue Integrity  Goal: Absence of new skin breakdown  Description: 1.  Monitor for areas of redness and/or skin breakdown  2.  Assess vascular access sites hourly  3.  Every 4-6 hours minimum:  Change oxygen saturation probe site  4.  Every 4-6 hours:  If on nasal continuous positive airway pressure, respiratory therapy assess nares and determine need for appliance change or resting period.  Outcome: Progressing     Problem: Safety - Adult  Goal: Free from fall injury  Outcome: Progressing     Problem: ABCDS Injury Assessment  Goal: Absence of physical injury  Outcome: Progressing     Problem: Discharge Planning  Goal: Discharge to home or other facility with appropriate resources  Outcome: Progressing     
(LRB):  Left heart cath / coronary angiography (N/A) 1 Day Post-Op   Precautions: Fall Risk, General Precautions, Bed Alarm (10 minute syncope PTA)                Chart, occupational therapy assessment, plan of care, and goals were reviewed.    ASSESSMENT  Patient continues to benefit from skilled OT services and is progressing towards goals. Pt tolerated OT session fair. Hesitant to participate, but eventually agreeable for minimal participation. Pt benefits from RW for improved standing balance and stability during functional ambulation. Pt presented with moderate, intermittent path deviations, but no overt LOB. Pt presents with decreased insight and safety awareness.              PLAN :  Patient continues to benefit from skilled intervention to address the above impairments.  Continue treatment per established plan of care to address goals.    Recommend with staff: OOB for meals; encourage ADL participation    Recommend next OT session: continue POC    Recommendation for discharge: (in order for the patient to meet his/her long term goals): Therapy 2 days/week in the home and also see \"other factors to consider\" below for additional discharge concerns/needs    Other factors to consider for discharge: impaired cognition, high risk for falls, and concern for safely navigating or managing the home environment    IF patient discharges home will need the following DME: continuing to assess with progress       SUBJECTIVE:   Patient stated “I'm going to make this as hard for you and it is for me.”    OBJECTIVE DATA SUMMARY:   Cognitive/Behavioral Status:  Orientation  Overall Orientation Status: Within Functional Limits  Cognition  Overall Cognitive Status: Exceptions  Safety Judgement: Decreased awareness of need for assistance;Decreased awareness of need for safety  Problem Solving: Decreased awareness of errors;Assistance required to generate solutions  Initiation: Requires cues for some  Sequencing: Requires cues

## 2024-02-20 NOTE — DISCHARGE SUMMARY
month(s)      [facility]ARD[facility]M [facility]  [address] [address]  [phone]  Call  to discuss participation in Cardiac Rehab          Total time in minutes spent coordinating this discharge (includes going over instructions, follow-up, prescriptions, and preparing report for sign off to her PCP) :  35 minutes

## 2024-02-20 NOTE — CARE COORDINATION
Transition of Care Plan:    RUR: 13%  Prior Level of Functioning: Independent  Disposition: Home with HH - [facility]  If SNF or IPR: Date FOC offered: 2/20/24  Date FOC received: 2/20/24  Accepting facility:   Date authorization started with reference number:   Date authorization received and expires:   Follow up appointments: PCP, Specialist  DME needed: has DME  Transportation at discharge: Caregiver arranged transportation for 2pm  IM/IMM Medicare/ letter given: 2nd IM Letter given  Is patient a  and connected with [facility]? N/A   If yes, was  transfer form completed and [facility] notified?   Caregiver Contact: DIANE Carr- [phone]  Discharge Caregiver contacted prior to discharge? CM discussed d/c plan with pt and DIANE Moody.  CM offered resources for Personal care aides to assist with ADL's and IADL's.  Care Conference needed? none  Barriers to discharge: none    Pt cleared for d/c from CM standpoint.    HH referral sent via [facility].  - [facility] has accepted       02/20/24 1253   Services At/After Discharge   Transition of Care Consult (CM Consult) Home Health   Services At/After Discharge Home Health   Mode of Transport at Discharge Other (see comment)  (caregiver arranged)   Confirm Follow Up Transport [facility]   Condition of Participation: Discharge Planning   The Plan for Transition of Care is related to the following treatment goals: Return to baseline   The Patient and/or Patient Representative was provided with a Choice of Provider? Patient         ELSY Evangelista,RN  Care   [facility]  Phone: [phone]

## 2024-04-16 ENCOUNTER — APPOINTMENT (OUTPATIENT)
Facility: HOSPITAL | Age: 79
End: 2024-04-16
Payer: MEDICARE

## 2024-04-16 ENCOUNTER — HOSPITAL ENCOUNTER (INPATIENT)
Facility: HOSPITAL | Age: 79
LOS: 3 days | Discharge: NURSING, REHAB OR LONG-TERM CARE | End: 2024-04-20
Attending: STUDENT IN AN ORGANIZED HEALTH CARE EDUCATION/TRAINING PROGRAM | Admitting: INTERNAL MEDICINE
Payer: MEDICARE

## 2024-04-16 DIAGNOSIS — R40.4 TRANSIENT ALTERATION OF AWARENESS: Primary | ICD-10-CM

## 2024-04-16 LAB
ALBUMIN SERPL-MCNC: 3.3 G/DL (ref 3.5–5)
ALBUMIN/GLOB SERPL: 1.2 (ref 1.1–2.2)
ALP SERPL-CCNC: 82 U/L (ref 45–117)
ALT SERPL-CCNC: 14 U/L (ref 12–78)
ANION GAP SERPL CALC-SCNC: 1 MMOL/L (ref 5–15)
APPEARANCE UR: CLEAR
AST SERPL-CCNC: 12 U/L (ref 15–37)
BACTERIA URNS QL MICRO: NEGATIVE /HPF
BASOPHILS # BLD: 0.1 K/UL (ref 0–0.1)
BASOPHILS NFR BLD: 1 % (ref 0–1)
BILIRUB SERPL-MCNC: 0.3 MG/DL (ref 0.2–1)
BILIRUB UR QL: NEGATIVE
BUN SERPL-MCNC: 26 MG/DL (ref 6–20)
BUN/CREAT SERPL: 21 (ref 12–20)
CALCIUM SERPL-MCNC: 8.9 MG/DL (ref 8.5–10.1)
CHLORIDE SERPL-SCNC: 110 MMOL/L (ref 97–108)
CO2 SERPL-SCNC: 34 MMOL/L (ref 21–32)
COLOR UR: ABNORMAL
COMMENT:: NORMAL
CREAT SERPL-MCNC: 1.25 MG/DL (ref 0.7–1.3)
D DIMER PPP FEU-MCNC: 3.2 MG/L FEU (ref 0–0.65)
DIFFERENTIAL METHOD BLD: ABNORMAL
EOSINOPHIL # BLD: 0.4 K/UL (ref 0–0.4)
EOSINOPHIL NFR BLD: 3 % (ref 0–7)
EPITH CASTS URNS QL MICRO: ABNORMAL /LPF
ERYTHROCYTE [DISTWIDTH] IN BLOOD BY AUTOMATED COUNT: 13.2 % (ref 11.5–14.5)
FLUAV AG NPH QL IA: NEGATIVE
FLUBV AG NOSE QL IA: NEGATIVE
GLOBULIN SER CALC-MCNC: 2.8 G/DL (ref 2–4)
GLUCOSE SERPL-MCNC: 79 MG/DL (ref 65–100)
GLUCOSE UR STRIP.AUTO-MCNC: NEGATIVE MG/DL
HCT VFR BLD AUTO: 38.5 % (ref 36.6–50.3)
HGB BLD-MCNC: 12.6 G/DL (ref 12.1–17)
HGB UR QL STRIP: ABNORMAL
HYALINE CASTS URNS QL MICRO: ABNORMAL /LPF (ref 0–2)
IMM GRANULOCYTES # BLD AUTO: 0.1 K/UL (ref 0–0.04)
IMM GRANULOCYTES NFR BLD AUTO: 1 % (ref 0–0.5)
KETONES UR QL STRIP.AUTO: NEGATIVE MG/DL
LEUKOCYTE ESTERASE UR QL STRIP.AUTO: NEGATIVE
LYMPHOCYTES # BLD: 2.4 K/UL (ref 0.8–3.5)
LYMPHOCYTES NFR BLD: 20 % (ref 12–49)
MAGNESIUM SERPL-MCNC: 2 MG/DL (ref 1.6–2.4)
MCH RBC QN AUTO: 32.6 PG (ref 26–34)
MCHC RBC AUTO-ENTMCNC: 32.7 G/DL (ref 30–36.5)
MCV RBC AUTO: 99.7 FL (ref 80–99)
MONOCYTES # BLD: 1.1 K/UL (ref 0–1)
MONOCYTES NFR BLD: 9 % (ref 5–13)
NEUTS SEG # BLD: 8.3 K/UL (ref 1.8–8)
NEUTS SEG NFR BLD: 66 % (ref 32–75)
NITRITE UR QL STRIP.AUTO: NEGATIVE
NRBC # BLD: 0 K/UL (ref 0–0.01)
NRBC BLD-RTO: 0 PER 100 WBC
NT PRO BNP: 686 PG/ML
PH UR STRIP: 6.5 (ref 5–8)
PHOSPHATE SERPL-MCNC: 3.3 MG/DL (ref 2.6–4.7)
PLATELET # BLD AUTO: 163 K/UL (ref 150–400)
PMV BLD AUTO: 12.5 FL (ref 8.9–12.9)
POTASSIUM SERPL-SCNC: 5 MMOL/L (ref 3.5–5.1)
PROT SERPL-MCNC: 6.1 G/DL (ref 6.4–8.2)
PROT UR STRIP-MCNC: NEGATIVE MG/DL
RBC # BLD AUTO: 3.86 M/UL (ref 4.1–5.7)
RBC #/AREA URNS HPF: ABNORMAL /HPF (ref 0–5)
SARS-COV-2 RDRP RESP QL NAA+PROBE: NOT DETECTED
SODIUM SERPL-SCNC: 145 MMOL/L (ref 136–145)
SOURCE: NORMAL
SP GR UR REFRACTOMETRY: 1.02
SPECIMEN HOLD: NORMAL
TROPONIN I SERPL HS-MCNC: 34 NG/L (ref 0–76)
URINE CULTURE IF INDICATED: ABNORMAL
UROBILINOGEN UR QL STRIP.AUTO: 1 EU/DL (ref 0.2–1)
WBC # BLD AUTO: 12.3 K/UL (ref 4.1–11.1)
WBC URNS QL MICRO: ABNORMAL /HPF (ref 0–4)

## 2024-04-16 PROCEDURE — 71045 X-RAY EXAM CHEST 1 VIEW: CPT

## 2024-04-16 PROCEDURE — 81001 URINALYSIS AUTO W/SCOPE: CPT

## 2024-04-16 PROCEDURE — 80053 COMPREHEN METABOLIC PANEL: CPT

## 2024-04-16 PROCEDURE — 83735 ASSAY OF MAGNESIUM: CPT

## 2024-04-16 PROCEDURE — 87635 SARS-COV-2 COVID-19 AMP PRB: CPT

## 2024-04-16 PROCEDURE — 83880 ASSAY OF NATRIURETIC PEPTIDE: CPT

## 2024-04-16 PROCEDURE — 85025 COMPLETE CBC W/AUTO DIFF WBC: CPT

## 2024-04-16 PROCEDURE — 71275 CT ANGIOGRAPHY CHEST: CPT

## 2024-04-16 PROCEDURE — 36415 COLL VENOUS BLD VENIPUNCTURE: CPT

## 2024-04-16 PROCEDURE — 84100 ASSAY OF PHOSPHORUS: CPT

## 2024-04-16 PROCEDURE — 6360000004 HC RX CONTRAST MEDICATION: Performed by: STUDENT IN AN ORGANIZED HEALTH CARE EDUCATION/TRAINING PROGRAM

## 2024-04-16 PROCEDURE — 2580000003 HC RX 258: Performed by: STUDENT IN AN ORGANIZED HEALTH CARE EDUCATION/TRAINING PROGRAM

## 2024-04-16 PROCEDURE — 70450 CT HEAD/BRAIN W/O DYE: CPT

## 2024-04-16 PROCEDURE — 84484 ASSAY OF TROPONIN QUANT: CPT

## 2024-04-16 PROCEDURE — 99285 EMERGENCY DEPT VISIT HI MDM: CPT

## 2024-04-16 PROCEDURE — 85379 FIBRIN DEGRADATION QUANT: CPT

## 2024-04-16 PROCEDURE — 93005 ELECTROCARDIOGRAM TRACING: CPT | Performed by: INTERNAL MEDICINE

## 2024-04-16 PROCEDURE — 87804 INFLUENZA ASSAY W/OPTIC: CPT

## 2024-04-16 RX ORDER — 0.9 % SODIUM CHLORIDE 0.9 %
250 INTRAVENOUS SOLUTION INTRAVENOUS ONCE
Status: COMPLETED | OUTPATIENT
Start: 2024-04-16 | End: 2024-04-16

## 2024-04-16 RX ADMIN — SODIUM CHLORIDE 250 ML: 9 INJECTION, SOLUTION INTRAVENOUS at 16:42

## 2024-04-16 RX ADMIN — IOPAMIDOL 100 ML: 755 INJECTION, SOLUTION INTRAVENOUS at 21:12

## 2024-04-16 NOTE — ED PROVIDER NOTES
\[facility]\"" EMERGENCY DEPT  EMERGENCY DEPARTMENT ENCOUNTER       Pt Name: Anshu Bonilla Jr.  MRN: [id number]  Birthdate 1945  Date of evaluation: 4/16/2024  Provider: Te Abraham MD   PCP: George Carr MD  Note Started: 4:16 PM EDT 4/16/24     CHIEF COMPLAINT       Chief Complaint   Patient presents with    Chest Pain     EMS reports chest pain after lying on defibrillator.  Pain every morning.  EMS reports weakness for 2 days, EKG showed peaked T waves with vomiting for 2 weeks.  IV inserted and IVF started.  Pt lives at a residential with caregiver.  EMS reports improvement of mental status with IVF. 500cc fluid given.     HISTORY OF PRESENT ILLNESS: 1 or more elements      History From: patient, History limited by: none     Anshu Bonilla Jr. is a 79 y.o. male with past medical history of coronary artery disease status post recent left heart catheterization in February 2024 as well as ICD placement 2024 for severe bradycardia and concerns for cardiogenic syncope presents to the emergency departments with EMS.  EMS was called by his roommate Fransisco Javier who provided most of the story to me.  He told me that the patient will get out of bed for the past 2 days except to smoke cigarettes.  He has been filling up his urinal and want him to get and he has had intermittent vomiting for the past few months and use the same receptacle.  He has had intermittent confusion over the past 2 to 3 months where he will be confused 1 day and then be totally normal the next day.  Patient lives with multiple roommates, but Mr. Javier is concerned the patient needs to stay at a skilled nursing facility rather than return to his home.  Mr. Daugherty did include that patient ha had chronic left-sided chest pain at his ICD site ever since it was placed that is worse in the morning when he wakes up which has been evaluated by multiple providers and has been attributed to MSK-related pain.  He told me that he is at  days.  Sounds like he has had intermittent, progressively worsening symptoms of confusion, lethargy, occasional vomiting, and polyuria.  He has no significant complaints.  He does seem slightly confused on exam.  Wonder if this may be from dehydration, electrolyte abnormality.  Have a lower suspicion for infection but will get a UA as he has had polyuria as well as chest x-ray, COVID, influenza swabs.  Will obtain lab work with CBC, CMP, magnesium, phosphorus, BNP.  CT head to eval ?AMS, but this does not seem acute so I doubt stroke and am more suspicious of vascular dementia given previous neuroimaging.  Weeks had intermittent chest pain attriubted to MSK-related disease - low risk Wells for PE so will check d-dimer. Doubt ACS, but will check troponin and EKG.    Amount and/or Complexity of Data Reviewed  Independent Historian: friend     Details: Fransisco Daugherty provided additional history  External Data Reviewed: radiology.     Details: MRI in 2022 at [facility]    \" Extensive confluent abnormal T2 prolongation within the periventricular and   subcortical white matter of the supratentorial brain without restricted   diffusion. \"    MRI 2/2024 within [facility]: T2 hyperintensities suggesting microangiopathy  Labs: ordered. Decision-making details documented in ED Course.  Radiology: ordered and independent interpretation performed. Decision-making details documented in ED Course.    Risk  OTC drugs.  Prescription drug management.  Decision regarding hospitalization.      Heart Score: 4 (age and cardiovascular disease) but does not complain of chest pain    NIH Stroke Scale  Interval: Baseline  Level of Consciousness (1a): Alert  LOC Questions (1b): Answers both correctly  LOC Commands (1c): Performs both tasks correctly  Best Gaze (2): Normal  Visual (3): No visual loss  Facial Palsy (4): Normal symmetrical movement  Motor Arm, Left (5a): No drift  Motor Arm, Right (5b): No drift  Motor Leg, Left (6a): No drift  Motor Leg,

## 2024-04-16 NOTE — CARE COORDINATION
CM acknowledges consult to CM for skilled nursing facility placement. Recommend PT and OT orders for evaluation of potential rehab/DME needs. If recommended for SNF and agreeable, pt will require 3 night stay per Medicare guidelines to meet criteria for transition to SNF. Appears pt is marked for potential admission. Care management will continue to be available to assist as transition of care planning needs arise.      VERÓNICA Mendoza.  Care Manager, [facility]  [phone]/Available on emoquo Serve

## 2024-04-17 ENCOUNTER — TELEPHONE (OUTPATIENT)
Facility: HOSPITAL | Age: 79
End: 2024-04-17

## 2024-04-17 PROBLEM — R53.1 WEAKNESS: Status: ACTIVE | Noted: 2024-04-17

## 2024-04-17 PROBLEM — R41.82 AMS (ALTERED MENTAL STATUS): Status: ACTIVE | Noted: 2024-04-17

## 2024-04-17 LAB
25(OH)D3 SERPL-MCNC: 10.8 NG/ML (ref 30–100)
FOLATE SERPL-MCNC: 9.5 NG/ML (ref 5–21)
LACTATE SERPL-SCNC: 1.9 MMOL/L (ref 0.4–2)
TSH SERPL DL<=0.05 MIU/L-ACNC: 0.71 UIU/ML (ref 0.36–3.74)
VIT B12 SERPL-MCNC: 227 PG/ML (ref 193–986)

## 2024-04-17 PROCEDURE — 83605 ASSAY OF LACTIC ACID: CPT

## 2024-04-17 PROCEDURE — 82607 VITAMIN B-12: CPT

## 2024-04-17 PROCEDURE — 2580000003 HC RX 258: Performed by: GENERAL ACUTE CARE HOSPITAL

## 2024-04-17 PROCEDURE — G0378 HOSPITAL OBSERVATION PER HR: HCPCS

## 2024-04-17 PROCEDURE — 82306 VITAMIN D 25 HYDROXY: CPT

## 2024-04-17 PROCEDURE — 97535 SELF CARE MNGMENT TRAINING: CPT

## 2024-04-17 PROCEDURE — 6360000002 HC RX W HCPCS: Performed by: GENERAL ACUTE CARE HOSPITAL

## 2024-04-17 PROCEDURE — 95816 EEG AWAKE AND DROWSY: CPT

## 2024-04-17 PROCEDURE — 97530 THERAPEUTIC ACTIVITIES: CPT

## 2024-04-17 PROCEDURE — 97161 PT EVAL LOW COMPLEX 20 MIN: CPT

## 2024-04-17 PROCEDURE — 6370000000 HC RX 637 (ALT 250 FOR IP): Performed by: GENERAL ACUTE CARE HOSPITAL

## 2024-04-17 PROCEDURE — 1100000000 HC RM PRIVATE

## 2024-04-17 PROCEDURE — 82746 ASSAY OF FOLIC ACID SERUM: CPT

## 2024-04-17 PROCEDURE — 36415 COLL VENOUS BLD VENIPUNCTURE: CPT

## 2024-04-17 PROCEDURE — 84443 ASSAY THYROID STIM HORMONE: CPT

## 2024-04-17 PROCEDURE — 97165 OT EVAL LOW COMPLEX 30 MIN: CPT

## 2024-04-17 RX ORDER — SODIUM CHLORIDE 0.9 % (FLUSH) 0.9 %
5-40 SYRINGE (ML) INJECTION PRN
Status: DISCONTINUED | OUTPATIENT
Start: 2024-04-17 | End: 2024-04-20 | Stop reason: HOSPADM

## 2024-04-17 RX ORDER — ASPIRIN 81 MG/1
81 TABLET ORAL DAILY
Status: DISCONTINUED | OUTPATIENT
Start: 2024-04-17 | End: 2024-04-20 | Stop reason: HOSPADM

## 2024-04-17 RX ORDER — ACETAMINOPHEN 325 MG/1
650 TABLET ORAL EVERY 6 HOURS PRN
Status: DISCONTINUED | OUTPATIENT
Start: 2024-04-17 | End: 2024-04-20 | Stop reason: HOSPADM

## 2024-04-17 RX ORDER — METOPROLOL SUCCINATE 25 MG/1
25 TABLET, EXTENDED RELEASE ORAL DAILY
Status: DISCONTINUED | OUTPATIENT
Start: 2024-04-17 | End: 2024-04-20 | Stop reason: HOSPADM

## 2024-04-17 RX ORDER — PAROXETINE HYDROCHLORIDE 20 MG/1
20 TABLET, FILM COATED ORAL EVERY MORNING
Status: DISCONTINUED | OUTPATIENT
Start: 2024-04-17 | End: 2024-04-20 | Stop reason: HOSPADM

## 2024-04-17 RX ORDER — ARFORMOTEROL TARTRATE 15 UG/2ML
15 SOLUTION RESPIRATORY (INHALATION)
Status: DISCONTINUED | OUTPATIENT
Start: 2024-04-17 | End: 2024-04-17

## 2024-04-17 RX ORDER — MAGNESIUM SULFATE IN WATER 40 MG/ML
2000 INJECTION, SOLUTION INTRAVENOUS PRN
Status: DISCONTINUED | OUTPATIENT
Start: 2024-04-17 | End: 2024-04-20 | Stop reason: HOSPADM

## 2024-04-17 RX ORDER — SODIUM CHLORIDE 9 MG/ML
INJECTION, SOLUTION INTRAVENOUS CONTINUOUS
Status: DISCONTINUED | OUTPATIENT
Start: 2024-04-17 | End: 2024-04-17

## 2024-04-17 RX ORDER — POTASSIUM CHLORIDE 7.45 MG/ML
10 INJECTION INTRAVENOUS PRN
Status: DISCONTINUED | OUTPATIENT
Start: 2024-04-17 | End: 2024-04-20 | Stop reason: HOSPADM

## 2024-04-17 RX ORDER — POLYETHYLENE GLYCOL 3350 17 G/17G
17 POWDER, FOR SOLUTION ORAL DAILY PRN
Status: DISCONTINUED | OUTPATIENT
Start: 2024-04-17 | End: 2024-04-20 | Stop reason: HOSPADM

## 2024-04-17 RX ORDER — SODIUM CHLORIDE 9 MG/ML
INJECTION, SOLUTION INTRAVENOUS PRN
Status: DISCONTINUED | OUTPATIENT
Start: 2024-04-17 | End: 2024-04-20 | Stop reason: HOSPADM

## 2024-04-17 RX ORDER — LOSARTAN POTASSIUM 100 MG/1
100 TABLET ORAL DAILY
Status: DISCONTINUED | OUTPATIENT
Start: 2024-04-17 | End: 2024-04-19

## 2024-04-17 RX ORDER — ENOXAPARIN SODIUM 100 MG/ML
40 INJECTION SUBCUTANEOUS DAILY
Status: DISCONTINUED | OUTPATIENT
Start: 2024-04-17 | End: 2024-04-20 | Stop reason: HOSPADM

## 2024-04-17 RX ORDER — ATORVASTATIN CALCIUM 40 MG/1
40 TABLET, FILM COATED ORAL NIGHTLY
Status: DISCONTINUED | OUTPATIENT
Start: 2024-04-17 | End: 2024-04-20 | Stop reason: HOSPADM

## 2024-04-17 RX ORDER — ONDANSETRON 2 MG/ML
4 INJECTION INTRAMUSCULAR; INTRAVENOUS EVERY 6 HOURS PRN
Status: DISCONTINUED | OUTPATIENT
Start: 2024-04-17 | End: 2024-04-20 | Stop reason: HOSPADM

## 2024-04-17 RX ORDER — ONDANSETRON 4 MG/1
4 TABLET, ORALLY DISINTEGRATING ORAL EVERY 8 HOURS PRN
Status: DISCONTINUED | OUTPATIENT
Start: 2024-04-17 | End: 2024-04-20 | Stop reason: HOSPADM

## 2024-04-17 RX ORDER — POTASSIUM CHLORIDE 20 MEQ/1
40 TABLET, EXTENDED RELEASE ORAL PRN
Status: DISCONTINUED | OUTPATIENT
Start: 2024-04-17 | End: 2024-04-20 | Stop reason: HOSPADM

## 2024-04-17 RX ORDER — ACETAMINOPHEN 650 MG/1
650 SUPPOSITORY RECTAL EVERY 6 HOURS PRN
Status: DISCONTINUED | OUTPATIENT
Start: 2024-04-17 | End: 2024-04-20 | Stop reason: HOSPADM

## 2024-04-17 RX ORDER — ALBUTEROL SULFATE 90 UG/1
2 AEROSOL, METERED RESPIRATORY (INHALATION) EVERY 4 HOURS PRN
Status: DISCONTINUED | OUTPATIENT
Start: 2024-04-17 | End: 2024-04-20 | Stop reason: HOSPADM

## 2024-04-17 RX ORDER — SODIUM CHLORIDE 0.9 % (FLUSH) 0.9 %
5-40 SYRINGE (ML) INJECTION EVERY 12 HOURS SCHEDULED
Status: DISCONTINUED | OUTPATIENT
Start: 2024-04-17 | End: 2024-04-20 | Stop reason: HOSPADM

## 2024-04-17 RX ADMIN — METOPROLOL SUCCINATE 25 MG: 25 TABLET, EXTENDED RELEASE ORAL at 08:44

## 2024-04-17 RX ADMIN — SODIUM CHLORIDE, PRESERVATIVE FREE 10 ML: 5 INJECTION INTRAVENOUS at 08:45

## 2024-04-17 RX ADMIN — ATORVASTATIN CALCIUM 40 MG: 40 TABLET, FILM COATED ORAL at 20:12

## 2024-04-17 RX ADMIN — ASPIRIN 81 MG: 81 TABLET, COATED ORAL at 08:44

## 2024-04-17 RX ADMIN — LOSARTAN POTASSIUM 100 MG: 100 TABLET, FILM COATED ORAL at 08:45

## 2024-04-17 RX ADMIN — ENOXAPARIN SODIUM 40 MG: 100 INJECTION SUBCUTANEOUS at 08:44

## 2024-04-17 RX ADMIN — ONDANSETRON 4 MG: 2 INJECTION INTRAMUSCULAR; INTRAVENOUS at 03:25

## 2024-04-17 RX ADMIN — PAROXETINE HYDROCHLORIDE 20 MG: 20 TABLET, FILM COATED ORAL at 08:44

## 2024-04-17 RX ADMIN — SODIUM CHLORIDE, PRESERVATIVE FREE 10 ML: 5 INJECTION INTRAVENOUS at 20:12

## 2024-04-17 NOTE — PROCEDURES
EEG REPORT    Patient Name: Anshu Bonilla Jr.  : 1945  Age: 79 y.o.    Ordering physician: No referring provider defined for this encounter.  Date of EEG start time: 2024 at 6:49 AM  Date of EEG end time: 2024 at 7:24 AM  EEG procedure number: [id number]  Diagnosis: Altered mental status  Interpreting physician: Rosa Morrow D.O.      Procedure: EEG    CLINICAL INDICATION: The patient is a 79 y.o. male who is being evaluated for baseline electro cerebral activities and to rule out seizure focus.      Current Facility-Administered Medications   Medication Dose Route Frequency    acetaminophen (TYLENOL) tablet 650 mg  650 mg Oral Q6H PRN    ondansetron (ZOFRAN) injection 4 mg  4 mg IntraVENous Q6H PRN    albuterol sulfate HFA (PROVENTIL;VENTOLIN;PROAIR) 108 (90 Base) MCG/ACT inhaler 2 puff  2 puff Inhalation Q4H PRN    losartan (COZAAR) tablet 100 mg  100 mg Oral Daily    metoprolol succinate (TOPROL XL) extended release tablet 25 mg  25 mg Oral Daily    PARoxetine (PAXIL) tablet 20 mg  20 mg Oral QAM    aspirin EC tablet 81 mg  81 mg Oral Daily    atorvastatin (LIPITOR) tablet 40 mg  40 mg Oral Nightly    sodium chloride flush 0.9 % injection 5-40 mL  5-40 mL IntraVENous 2 times per day    sodium chloride flush 0.9 % injection 5-40 mL  5-40 mL IntraVENous PRN    0.9 % sodium chloride infusion   IntraVENous PRN    potassium chloride (KLOR-CON M) extended release tablet 40 mEq  40 mEq Oral PRN    Or    potassium bicarb-citric acid (EFFER-K) effervescent tablet 40 mEq  40 mEq Oral PRN    Or    potassium chloride 10 mEq/100 mL IVPB (Peripheral Line)  10 mEq IntraVENous PRN    magnesium sulfate 2000 mg in 50 mL IVPB premix  2,000 mg IntraVENous PRN    enoxaparin (LOVENOX) injection 40 mg  40 mg SubCUTAneous Daily    ondansetron (ZOFRAN-ODT) disintegrating tablet 4 mg  4 mg Oral Q8H PRN    Or    ondansetron (ZOFRAN) injection 4 mg  4 mg IntraVENous Q6H PRN    polyethylene glycol (GLYCOLAX) packet

## 2024-04-17 NOTE — CARE COORDINATION
0913 - Unit CM noted pt admitted to observation. Also noted consult to CM in the ED for possible SNF placement. Per previous CM note, pt will need 3 midnight qualifying inpatient stay in order to qualify. PT/OT to see and make recommendations. Unit CM has sent message to MD via [employer] to inquire if pt can be upgraded to avoid any delays in placement.    CHATO Petersen  Care Management  [facility]  [phone]

## 2024-04-17 NOTE — PROGRESS NOTES
Orientation Status: Within Normal Limits  Orientation Level: Oriented X4     Vision/Perceptual:    Vision - Basic Assessment  Prior Vision: No visual deficits           Range of Motion:   AROM: Within functional limits  PROM: Within functional limits    Strength:  Strength: Within functional limits    Coordination:  Coordination: Within functional limits    Tone & Sensation:   Tone: Normal  Sensation: Intact      Functional Mobility and Transfers for ADLs:  Bed Mobility:     Bed Mobility Training  Bed Mobility Training: Yes  Rolling: Independent  Supine to Sit: Independent  Scooting: Independent    Transfers:     Transfer Training  Transfer Training: Yes  Sit to Stand: Independent  Stand to Sit: Independent  Bed to Chair: Adaptive equipment;Supervision  Toilet Transfer: Independent;Adaptive equipment      Balance:   Standing: Impaired  Balance  Sitting: Intact  Standing: Impaired  Standing - Static: Good;Constant support  Standing - Dynamic: Good;Constant support      ADL Assessment:   Feeding: Independent     Grooming: Independent     UE Bathing: Modified independent      Skin Care: Chlorhexidine wipes (Patient refused and stated maybe later)    LE Bathing: Modified independent      UE Dressing: Independent     LE Dressing: Independent     Toileting: Independent    Functional Mobility: Supervision;Adaptive equipment  Functional Mobility Skilled Clinical Factors: using RW     Skin Care: Chlorhexidine wipes (Patient refused and stated maybe later)    ADL Intervention and task modifications:    Patient instructed and indicated understanding the benefits of maintaining activity tolerance, functional mobility, and independence with self care tasks during acute stay  to ensure safe return home and to baseline. Encouraged patient to increase frequency and duration OOB, be out of bed for all meals, perform daily ADLs (as approved by RN/MD regarding bathing etc), and performing functional mobility to/from bathroom.    Pt  Patient  Education Provided: Role of Therapy;Plan of Care;ADL Adaptive Strategies;Transfer Training;Energy Conservation;Orientation;Fall Prevention Strategies  Education Method: Verbal  Barriers to Learning: None  Education Outcome: Verbalized understanding    Thank you for this referral.  Abeba Rodriguez OT  Minutes: 44    Occupational Therapy Evaluation Charge Determination   History Examination Decision-Making   LOW Complexity : Brief history review  LOW Complexity: 1-3 Performance deficits relating to physical, cognitive, or psychosocial skills that result in activity limitations and/or participation restrictions LOW Complexity: No comorbidities that affect functional and  no verbal  or physical assist needed to complete eval tasks   Based on the above components, the patient evaluation is determined to be of the following complexity level: Low

## 2024-04-17 NOTE — TELEPHONE ENCOUNTER
Hi can we set this patient up for a nonurgent appointment for neurology to obtain formal neuropsych testing thank you

## 2024-04-17 NOTE — H&P
Hospitalist Admission Note    NAME:   Anshu Bonilla Jr.   : 1945   MRN: [id number]     Date/Time: 2024 2:02 AM    Patient PCP: George Carr MD    ______________________________________________________________________  Given the patient's current clinical presentation, I have a high level of concern for decompensation if discharged from the emergency department.  Complex decision making was performed, which includes reviewing the patient's available past medical records, laboratory results, and x-ray films.       My assessment of this patient's clinical condition and my plan of care is as follows.    Assessment / Plan:    ?  Dementia  Mental/functional decline  Generalized weakness  MRI in February showed White matter T2 hyperintensities favoring chronic  microangiopathy.  Patient may have worsening dementia.  Neurology consult  Brain MRI  Based on prior records, roommate reported seizure at home, prior EEG negative, will repeat EEG  Check TSH, vitamin D, B12 and folate   consult/PT/OT consult  Called Fransisco Daugherty, [phone], called no answered, left voicemail   Addendum: POA called back, and he reported that pt has been laying in bed most of the day, no fever/chills, not interactive, chest pain related to AICD. POA can't care for pt anymore.   Pt been vomiting the last few days, will get CT abd and pelvis, abd exam benign.     Atypical chest pain, related to AICD  Negative troponin  Negative EKG  Likely musculoskeletal    Incidental findings  Small nodules in the upper lobes bilaterally, possibly infectious/inflammatory. The largest nodule measures 7 mm in the left upper  lobe. Consider 3-month follow-up chest CT to evaluate for resolution.  Cholelithiasis without gallbladder inflammation.  Follow-up with PCP    CAD  Status post CABG  Hypertension/hyperlipidemia  Status post abdominal aortic aneurysm, status post EVAR  Chronic systolic heart failure EF 25%,  Type 2  ORTHOPEDIC SURGERY      right foot Knox's neuroma    ORTHOPEDIC SURGERY      neck    ORTHOPEDIC SURGERY      left knee arthroscopic surgery    NM UNLISTED PROCEDURE CARDIAC SURGERY  15    CABG    NM UNLISTED PROCEDURE CARDIAC SURGERY  1992, 2006, 2008    stents    TONSILLECTOMY         Social History     Tobacco Use    Smoking status: Every Day     Current packs/day: 0.00     Types: Cigarettes     Last attempt to quit: 2013     Years since quittin.0    Smokeless tobacco: Never    Tobacco comments:     Quit smoking: started smoking again   Substance Use Topics    Alcohol use: Yes        Family History   Problem Relation Age of Onset    Stroke Maternal Grandfather     Heart Disease Mother     Elevated Lipids Mother     Hypertension Mother     Heart Disease Paternal Grandmother     Diabetes Mother     Asthma Mother     Osteoarthritis Mother     Heart Disease Paternal Grandfather     Heart Disease Maternal Grandmother     Heart Disease Father     Osteoarthritis Sister     Heart Disease Maternal Grandfather     Elevated Lipids Sister     Diabetes Sister        Allergies   Allergen Reactions    Penicillin G Rash        Prior to Admission medications    Medication Sig Start Date End Date Taking? Authorizing Provider   fenofibrate (TRICOR) 48 MG tablet Take 1 tablet by mouth  Patient not taking: Reported on 2024   ProviderCharu MD   metoprolol succinate (TOPROL XL) 25 MG extended release tablet Take 1 tablet by mouth daily 24   Wade Bass MD   losartan (COZAAR) 100 MG tablet Take 1 tablet by mouth daily 24   Wade Bass MD   atorvastatin (LIPITOR) 40 MG tablet Take 1 tablet by mouth nightly  Patient not taking: Reported on 2024   Wade Bass MD   PARoxetine (PAXIL) 20 MG tablet Take 1 tablet by mouth every morning    Provider, MD Charu   acetaminophen (TYLENOL) 325 MG tablet Take 2 tablets by mouth every 4 hours as needed 3/7/20

## 2024-04-17 NOTE — CARE COORDINATION
Care Management Initial Assessment       RUR: 20% (high RUR)  Readmission? No  1st IM letter given? Yes - Pt access  1st  letter given: No    CM noted pt confused, CM noted ACP docs on file. CM contacted POA, Fransisco Dat. CM introduced self and role and completed initial assessment. CM verified demographic and clinical information.     Pt lives with friend/POA in a 1 level home, [address]. POA reports pt has ability to complete ADLs but chooses not to. Pt will lay in bed for several days, has POA deliver meals to bedside, urinates in a portable urinal. POA reports bizarre, contradicting and inappropriate behavior such as urinating in sink, only wanting to stay in bed, reporting inability to ambulate but then will independently ambulate to kitchen for snack, unwillingness to empty urinal, reported inability to care for self. POA reports these behaviors are new within in the last few months. Pt independent prior.    POA reports pt does ambulate with RW lately, and also has cane and rollator at home. POA denies O2/CPAP at home. POA states they are pt's primary caregiver at this time but POA has a family member in the home that has recently had very extensive medical complications. Pt does have a sister that is listed in chart. Per nursing, pt reported he will go stay with her at d/c if able, but POA reports sister also has significant cognitive impairments and her  is her caregiver.     Patient is not an active .     POA reports hx of HH with [facility] and hx of SNF at the [facility] (unsure which location), about a year ago.      CM clarified with POA if pt is able to return home. POA confirmed, he would ultimately like pt to return home but pt does need rehab prior to return. POA is in agreement to placement, would like referral sent to [facility]. CM placed referral via [facility].     Pt will likely need BLS at d/c. Will meet 3 midnight stay 04/20. CM will continue to follow.        04/17/24 [id number]

## 2024-04-17 NOTE — ED NOTES
2341: MD Best speaking with pt at this time   2341: This RN called and spoke with pt's contact Fransisco to see if pt would be able to come  pt; however, Fransisco refusing to come  pt. Per Fransisco, \"He won't take care of himself, he needs rehab. I am his POA but I am not his caretaker and I am not strong enough to be a constant care taker to him. He stays in bed for days and he does not take care of himself. He throws up in a bowl or in his urinal but does not empty.\" MD Best notified and to speak with Fransisco at this time.   0307: Report called to Hanna PADILLA at this time

## 2024-04-17 NOTE — PLAN OF CARE
Problem: Physical Therapy - Adult  Goal: By Discharge: Performs mobility at highest level of function for planned discharge setting.  See evaluation for individualized goals.  Description: FUNCTIONAL STATUS PRIOR TO ADMISSION: Pt limited historian due to confusion at time of evaluation. Reports indep amb with use of rollator at times. Admits to spending increased time in bed; per roommate, pt only gets up to smoke and is unable to/ does not adequately care for self    HOME SUPPORT PRIOR TO ADMISSION: Pt lives with several roommates; states that he may be able to return home with his sister    Physical Therapy Goals  Initiated 4/17/2024  1.  Patient will move from supine to sit and sit to supine in bed with independence within 7 day(s).    2.  Patient will perform sit to stand with modified indep within 7 day(s).  3.  Patient will transfer from bed to chair and chair to bed with supervision/set-up using the least restrictive device within 7 day(s).  4.  Patient will ambulate with supervision/set-up for 100 feet with the least restrictive device within 7 day(s).   5.  Patient will ascend/descend 5 stairs with bilat handrail(s) with contact guard assist within 7 day(s).   Outcome: Progressing   PHYSICAL THERAPY EVALUATION    Patient: Anshu Bonilla Jr. (79 y.o. male)  Date: 4/17/2024  Primary Diagnosis: Transient alteration of awareness [R40.4]  AMS (altered mental status) [R41.82]  Weakness [R53.1]       Precautions: Restrictions/Precautions: Fall Risk, Bed Alarm       ASSESSMENT :   DEFICITS/IMPAIRMENTS:   The patient is limited by decreased functional mobility, activity tolerance, safety awareness, cognition, attention/concentration, balance following admit with AMS, general weakness, recent h/o vomiting. PMH includes severe bradycardia s/p ICD placement Feb 2024, roommate reports pt h/o intermittent confusion x 2-3 months.    Pt received in bed following recent OT session. Alert, disoriented x 4 as noted below,  Risk  19-24 = Moderate Fall Risk  25-28 = Low Fall Risk  Kelvin BATISTA. Performance-Oriented Assessment of Mobility Problems in Elderly Patients. JAGS 1986; 34:119-126. (Scoring Description: PT Bulletin Feb. 10, 1993)    Older adults: (Ko et al, 2009; n = 1000 [place] elderly evaluated with ABC, IBAN, ADL, and IADL)  · Mean IBAN score for males aged 65-79 years = 26.21(3.40)  · Mean IBAN score for females age 65-79 years = 25.16(4.30)  · Mean IBAN score for males over 80 years = 23.29(6.02)  · Mean IBAN score for females over 80 years = 17.20(8.32)                                                                                                                                                                                                                               Pain Rating:  No c/o pain     Activity Tolerance:   Good and Fair     After treatment:   Patient left in no apparent distress in bed, Call bell within reach, Bed/ chair alarm activated, Side rails x3, and Patient offloaded in partial left side lying for pressure relief    COMMUNICATION/EDUCATION:   The patient's plan of care was discussed with: registered nurse    Patient Education  Education Given To: Patient  Education Provided: Role of Therapy;Plan of Care;Fall Prevention Strategies;Equipment;Orientation  Education Method: Verbal  Barriers to Learning: Cognition  Education Outcome: Continued education needed    Thank you for this referral.  Andreina Bower, PT         Physical Therapy Evaluation Charge Determination   History Examination Presentation Decision-Making   HIGH Complexity :3+ comorbidities / personal factors will impact the outcome/ POC  MEDIUM Complexity : 3 Standardized tests and measures addressin body structure, function, activity limitation and / or participation in recreation  MEDIUM Complexity : Evolving with changing characteristics  Tinetti Gait and Balance  LOW    Based on the above components, the patient evaluation is determined to be

## 2024-04-17 NOTE — PLAN OF CARE
End of Shift Note    Bedside shift change report given to  RANDY Shaw  (oncoming nurse) by Hanna Peterson RN .        Shift worked:  Night shift   Shift summary and any significant changes:    Antiemetic administered for intermittent nausea. MRI screen to be completed in AM once patient's mentation improves. Standby assist with walker to ambulate provided. PT & OT evals pending.       Concerns for physician to address:  Neurology consult, EEG and MRI brain pending. Minimum 3-night stay to qualify for SNF.   Zone phone for oncoming shift:   [phone]     Patient Information  Anshu Bonilla Jr.  79 y.o.  4/16/2024  3:49 PM by Rasheeda Palma MD. Anshu Bonilla Jr. was admitted from [facility]    Problem List  Patient Active Problem List    Diagnosis Date Noted    AMS (altered mental status) 04/17/2024    Bilateral carotid artery stenosis 02/09/2024    Syncope and collapse 02/08/2024    S/P CABG x 4 04/06/2013    CAD (coronary artery disease) 04/03/2013     Past Medical History:   Diagnosis Date    Aneurysm (HCC)     abdominal    Arthritis     CAD (coronary artery disease)     heart attack    Heart failure (HCC)     CHF    Ill-defined condition 12/2017    nuclear stress test - led to catherization being done    Ill-defined condition     pre-COPD    Ill-defined condition     pre-diabetes    Pacemaker     [employer]       Core Measures:  CVA: N/A  CHF: yes  PNA: N/A    Activity:  Level of Assistance: Standby assist, set-up cues, supervision of patient - no hands on  Number times ambulated in hallways past shift: 0   Number of times OOB to chair past shift: 1    Cardiac:   Cardiac Monitoring: yes, portable    Access:   Current line(s): PIV    Respiratory:   O2 Device: None (Room air)    GI:  Last BM (including prior to admit): 04/17/24  Current diet:  ADULT DIET; Regular  Tolerating current diet: Yes    Pain Management:   Patient states pain is manageable on current regimen: N/A    Skin:  Rashard Scale Score:

## 2024-04-17 NOTE — ED NOTES
TRANSFER - OUT REPORT:    Verbal report given to Hanna PADILLA on Anshu Bonilla Jr.  being transferred to [facility] for routine progression of patient care       Report consisted of patient's Situation, Background, Assessment and   Recommendations(SBAR).     Information from the following report(s) Nurse Handoff Report, Index, ED Encounter Summary, ED SBAR, Adult Overview, Intake/Output, MAR, Recent Results, and Cardiac Rhythm SINUS  was reviewed with the receiving nurse.    [place] Fall Assessment:    Presents to emergency department  because of falls (Syncope, seizure, or loss of consciousness): No  Age > 70: Yes  Altered Mental Status, Intoxication with alcohol or substance confusion (Disorientation, impaired judgment, poor safety awaremess, or inability to follow instructions): Yes  Impaired Mobility: Ambulates or transfers with assistive devices or assistance; Unable to ambulate or transer.: No  Nursing Judgement: Yes          Lines:   Peripheral IV 04/16/24 Left;Proximal Forearm (Active)        Opportunity for questions and clarification was provided.      Patient transported with:  Tech

## 2024-04-17 NOTE — CONSULTS
Date of Consultation:  April 17, 2024    Referring Physician: MD Nirav    Reason for Consultation: Question of dementia    Chief Complaint   Patient presents with    Chest Pain     EMS reports chest pain after lying on defibrillator.  Pain every morning.  EMS reports weakness for 2 days, EKG showed peaked T waves with vomiting for 2 weeks.  IV inserted and IVF started.  Pt lives at a residential with caregiver.  EMS reports improvement of mental status with IVF. 500cc fluid given.       History of Present Illness:   Anshu Bonilla Jr. is a 79 y.o. male with history of CAD, CHF, prediabetes who presents with generalized functional decline.  Neurology was consulted for question of dementia.    Patient tells me that the reason he is at the hospital is \"because my roommate is getting sick and tired of me\".  He denies any new visual changes, new dizziness, new focal weakness or numbness, confusion, new speech or language dysfunction.  He has had some balance issues over a year now but has not had any falls.  Reportedly power of  had called stating that he has been laying in bed for most of the day and not being interactive.  Also noted that POA cannot take care of the patient anymore.    He had also been vomiting for the last few days and having some atypical chest pain.    He has had multiple EEGs in the past which have shown some mild cerebral dysfunction however no seizures or epileptiform discharges.  He denies any personal history of seizures, gaps of time is seen throughout the day, family history of seizures, meningitis or encephalitis, significant TBI.  Denies any chronic history of alcohol use or drug use.      Past Medical History:   Diagnosis Date    Aneurysm (HCC)     abdominal    Arthritis     CAD (coronary artery disease)     heart attack    Heart failure (HCC)     CHF    Ill-defined condition 12/2017    nuclear stress test - led to catherization being done    Ill-defined condition     pre-COPD

## 2024-04-17 NOTE — PROGRESS NOTES
End of Shift Note     Bedside shift change report given to RANDY Lux  (oncoming nurse) by Valeria Sunshine RN .          Shift worked: days   Shift summary and any significant changes:     PT/OT worked with patient  Neuro came to bedside  Discontinued MRI         Concerns for physician to address: Placement coordination with POA and CM   Zone phone for oncoming shift:   [phone]      Patient Information  Anshu Bonilla Jr.  79 y.o.  4/16/2024  3:49 PM by Rasheeda Palma MD. Anshu Bonilla Jr. was admitted from [facility]     Problem List       Patient Active Problem List     Diagnosis Date Noted    AMS (altered mental status) 04/17/2024    Bilateral carotid artery stenosis 02/09/2024    Syncope and collapse 02/08/2024    S/P CABG x 4 04/06/2013    CAD (coronary artery disease) 04/03/2013      Past Medical History        Past Medical History:   Diagnosis Date    Aneurysm (HCC)       abdominal    Arthritis      CAD (coronary artery disease)       heart attack    Heart failure (HCC)       CHF    Ill-defined condition 12/2017     nuclear stress test - led to catherization being done    Ill-defined condition       pre-COPD    Ill-defined condition       pre-diabetes    Pacemaker       [employer]            Core Measures:  CVA: N/A  CHF: yes  PNA: N/A     Activity:  Level of Assistance: Standby assist, set-up cues, supervision of patient - no hands on  Number times ambulated in hallways past shift: 0       Number of times OOB to chair past shift: 1     Cardiac:   Cardiac Monitoring: yes, portable     Access:   Current line(s): PIV     Respiratory:   O2 Device: None (Room air)     GI:  Last BM (including prior to admit): 04/17/24  Current diet:  ADULT DIET; Regular  Tolerating current diet: Yes     Pain Management:   Patient states pain is manageable on current regimen: N/A     Skin:  Rashard Scale Score: 19  Interventions: speciality bed  Pressure injury: no    Patient Safety:  Fall Score: Romero Total Score:  60  Interventions: bed alarm  Self-release roll belt: No  Dexterity to release roll belt: N/A   (must document dexterity  here by stating Yes or No here, otherwise this is a restraint and must follow restraint documentation policy.)     DVT prophylaxis:  DVT prophylaxis: meds     Active Consults:  IP CONSULT TO CASE MANAGEMENT  IP CONSULT TO NEUROLOGY     Length of Stay:  Expected LOS: 2  Actual LOS: 0

## 2024-04-17 NOTE — PROGRESS NOTES
This is a 79-year-old male who was admitted for confusional state, underlying dementia  Reviewed H&P, I agree with current management  CT chest  1.  No evidence of pulmonary embolism.  2.  Small nodules in the upper lobes bilaterally, possibly  infectious/inflammatory. The largest nodule measures 7 mm in the left upper  lobe. Consider 3-month follow-up chest CT to evaluate for resolution.  3.  Cholelithiasis without gallbladder inflammation.    Patient seen and examined, seems to be confused  Continue current management  Nonbillable round

## 2024-04-18 ENCOUNTER — APPOINTMENT (OUTPATIENT)
Facility: HOSPITAL | Age: 79
End: 2024-04-18
Payer: MEDICARE

## 2024-04-18 PROCEDURE — 76705 ECHO EXAM OF ABDOMEN: CPT

## 2024-04-18 PROCEDURE — 6370000000 HC RX 637 (ALT 250 FOR IP): Performed by: STUDENT IN AN ORGANIZED HEALTH CARE EDUCATION/TRAINING PROGRAM

## 2024-04-18 PROCEDURE — 6370000000 HC RX 637 (ALT 250 FOR IP): Performed by: GENERAL ACUTE CARE HOSPITAL

## 2024-04-18 PROCEDURE — 2580000003 HC RX 258: Performed by: GENERAL ACUTE CARE HOSPITAL

## 2024-04-18 PROCEDURE — 1100000000 HC RM PRIVATE

## 2024-04-18 PROCEDURE — 6360000002 HC RX W HCPCS: Performed by: GENERAL ACUTE CARE HOSPITAL

## 2024-04-18 PROCEDURE — 97116 GAIT TRAINING THERAPY: CPT

## 2024-04-18 PROCEDURE — 6360000002 HC RX W HCPCS: Performed by: INTERNAL MEDICINE

## 2024-04-18 PROCEDURE — 97530 THERAPEUTIC ACTIVITIES: CPT

## 2024-04-18 RX ORDER — VITAMIN B COMPLEX
2000 TABLET ORAL DAILY
Status: DISCONTINUED | OUTPATIENT
Start: 2024-04-26 | End: 2024-04-20 | Stop reason: HOSPADM

## 2024-04-18 RX ORDER — VITAMIN B COMPLEX
6000 TABLET ORAL DAILY
Status: DISCONTINUED | OUTPATIENT
Start: 2024-04-19 | End: 2024-04-20 | Stop reason: HOSPADM

## 2024-04-18 RX ORDER — PROCHLORPERAZINE EDISYLATE 5 MG/ML
5 INJECTION INTRAMUSCULAR; INTRAVENOUS EVERY 6 HOURS PRN
Status: DISCONTINUED | OUTPATIENT
Start: 2024-04-18 | End: 2024-04-20 | Stop reason: HOSPADM

## 2024-04-18 RX ADMIN — PAROXETINE HYDROCHLORIDE 20 MG: 20 TABLET, FILM COATED ORAL at 08:53

## 2024-04-18 RX ADMIN — PROCHLORPERAZINE EDISYLATE 5 MG: 5 INJECTION INTRAMUSCULAR; INTRAVENOUS at 12:42

## 2024-04-18 RX ADMIN — ATORVASTATIN CALCIUM 40 MG: 40 TABLET, FILM COATED ORAL at 21:08

## 2024-04-18 RX ADMIN — METOPROLOL SUCCINATE 25 MG: 25 TABLET, EXTENDED RELEASE ORAL at 08:53

## 2024-04-18 RX ADMIN — ACETAMINOPHEN 650 MG: 325 TABLET ORAL at 02:23

## 2024-04-18 RX ADMIN — SODIUM CHLORIDE, PRESERVATIVE FREE 10 ML: 5 INJECTION INTRAVENOUS at 21:10

## 2024-04-18 RX ADMIN — ENOXAPARIN SODIUM 40 MG: 100 INJECTION SUBCUTANEOUS at 08:53

## 2024-04-18 RX ADMIN — SODIUM CHLORIDE, PRESERVATIVE FREE 10 ML: 5 INJECTION INTRAVENOUS at 08:56

## 2024-04-18 RX ADMIN — ONDANSETRON 4 MG: 4 TABLET, ORALLY DISINTEGRATING ORAL at 08:53

## 2024-04-18 RX ADMIN — LOSARTAN POTASSIUM 100 MG: 100 TABLET, FILM COATED ORAL at 08:53

## 2024-04-18 RX ADMIN — ASPIRIN 81 MG: 81 TABLET, COATED ORAL at 08:53

## 2024-04-18 ASSESSMENT — PAIN DESCRIPTION - DESCRIPTORS: DESCRIPTORS: ACHING

## 2024-04-18 ASSESSMENT — PAIN DESCRIPTION - ORIENTATION: ORIENTATION: ANTERIOR

## 2024-04-18 ASSESSMENT — PAIN DESCRIPTION - LOCATION: LOCATION: HEAD

## 2024-04-18 ASSESSMENT — PAIN SCALES - GENERAL: PAINLEVEL_OUTOF10: 6

## 2024-04-18 NOTE — CARE COORDINATION
CM  verifying that pt will meet Medicare 3 night in-pt stay requirement for SNF on 4/20/24.    CHATO Sheldon  [facility] CM   [phone]

## 2024-04-18 NOTE — PLAN OF CARE
Problem: Physical Therapy - Adult  Goal: By Discharge: Performs mobility at highest level of function for planned discharge setting.  See evaluation for individualized goals.  Description: FUNCTIONAL STATUS PRIOR TO ADMISSION: Pt limited historian due to confusion at time of evaluation. Reports indep amb with use of rollator at times. Admits to spending increased time in bed; per roommate, pt only gets up to smoke and is unable to/ does not adequately care for self    HOME SUPPORT PRIOR TO ADMISSION: Pt lives with several roommates; states that he may be able to return home with his sister    Physical Therapy Goals  Initiated 4/17/2024  1.  Patient will move from supine to sit and sit to supine in bed with independence within 7 day(s).    2.  Patient will perform sit to stand with modified indep within 7 day(s).  3.  Patient will transfer from bed to chair and chair to bed with supervision/set-up using the least restrictive device within 7 day(s).  4.  Patient will ambulate with supervision/set-up for 100 feet with the least restrictive device within 7 day(s).   5.  Patient will ascend/descend 5 stairs with bilat handrail(s) with contact guard assist within 7 day(s).   Outcome: Progressing   PHYSICAL THERAPY TREATMENT    Patient: Anshu Bonilla Jr. (79 y.o. male)  Date: 4/18/2024  Diagnosis: Transient alteration of awareness [R40.4]  AMS (altered mental status) [R41.82]  Weakness [R53.1] AMS (altered mental status)      Precautions: Fall Risk, Bed Alarm                      ASSESSMENT:  Patient continues to benefit from skilled PT services and is slowly progressing towards goals. He demonstrates the ability to ambulate increased distance with use of RW. When offered a rollator patient immediately sits down at attempts to push himself backwards back to his room. Education about why pushing himself on the rollator is a poor technique and increases the risk for falls. Patient is too fatigued to ambulate farther. He

## 2024-04-18 NOTE — PROGRESS NOTES
End of Shift Note     Bedside shift change report given to RANDY Ngo  (oncoming nurse) by Valeria Sunshine RN .          Shift worked: 7a - 7p   Shift summary and any significant changes:     PT/OT worked with patient. No significant changes    Concerns for physician to address: See notes above   Zone phone for oncoming shift:   [phone]      Patient Information  Anshu Bonilla Jr.  79 y.o.  4/16/2024  3:49 PM by Rasheeda Palma MD. Anshu Bonilla Jr. was admitted from [facility]     Problem List       Patient Active Problem List     Diagnosis Date Noted    AMS (altered mental status) 04/17/2024    Bilateral carotid artery stenosis 02/09/2024    Syncope and collapse 02/08/2024    S/P CABG x 4 04/06/2013    CAD (coronary artery disease) 04/03/2013      Past Medical History        Past Medical History:   Diagnosis Date    Aneurysm (HCC)       abdominal    Arthritis      CAD (coronary artery disease)       heart attack    Heart failure (HCC)       CHF    Ill-defined condition 12/2017     nuclear stress test - led to catherization being done    Ill-defined condition       pre-COPD    Ill-defined condition       pre-diabetes    Pacemaker       [employer]            Core Measures:  CVA: N/A  CHF: yes  PNA: N/A     Activity:  Level of Assistance: Standby assist, set-up cues, supervision of patient - no hands on  Number times ambulated in hallways past shift: 0       Number of times OOB to chair past shift: 1     Cardiac:   Cardiac Monitoring: yes, portable     Access:   Current line(s): PIV     Respiratory:   O2 Device: None (Room air)     GI:  Last BM (including prior to admit): 04/17/24  Current diet:  ADULT DIET; Regular  Tolerating current diet: Yes     Pain Management:   Patient states pain is manageable on current regimen: N/A     Skin:  Rashard Scale Score: 19  Interventions: speciality bed  Pressure injury: no    Patient Safety:  Fall Score: Romero Total Score: 60  Interventions: bed alarm  Self-release roll belt:  No  Dexterity to release roll belt: N/A   (must document dexterity  here by stating Yes or No here, otherwise this is a restraint and must follow restraint documentation policy.)     DVT prophylaxis:  DVT prophylaxis: meds     Active Consults:  IP CONSULT TO CASE MANAGEMENT  IP CONSULT TO NEUROLOGY     Length of Stay:  Expected LOS: 2  Actual LOS: 0

## 2024-04-18 NOTE — PLAN OF CARE
Problem: Discharge Planning  Goal: Discharge to home or other facility with appropriate resources  4/17/2024 2213 by Maci Reyes LPN  Outcome: Progressing  4/17/2024 1013 by Valeria Sunshine RN  Outcome: Progressing     Problem: Skin/Tissue Integrity  Goal: Absence of new skin breakdown  Description: 1.  Monitor for areas of redness and/or skin breakdown  2.  Assess vascular access sites hourly  3.  Every 4-6 hours minimum:  Change oxygen saturation probe site  4.  Every 4-6 hours:  If on nasal continuous positive airway pressure, respiratory therapy assess nares and determine need for appliance change or resting period.  4/17/2024 2213 by Maci Reyes LPN  Outcome: Progressing  4/17/2024 1013 by Valeria Sunshine RN  Outcome: Progressing     Problem: Safety - Adult  Goal: Free from fall injury  4/17/2024 2213 by Maci Reyes LPN  Outcome: Progressing  4/17/2024 1013 by Valeria Sunshine RN  Outcome: Progressing     Problem: Chronic Conditions and Co-morbidities  Goal: Patient's chronic conditions and co-morbidity symptoms are monitored and maintained or improved  4/17/2024 2213 by Maci Reyes LPN  Outcome: Progressing  4/17/2024 1013 by Valeria Sunshine RN  Outcome: Progressing     Problem: Neurosensory - Adult  Goal: Achieves stable or improved neurological status  4/17/2024 2213 by Maci Reyes LPN  Outcome: Progressing  4/17/2024 1013 by Valeria Sunshine RN  Outcome: Progressing  Goal: Achieves maximal functionality and self care  4/17/2024 2213 by Maci Reyes LPN  Outcome: Progressing  4/17/2024 1013 by Valeria Sunshine RN  Outcome: Progressing     Problem: Gastrointestinal - Adult  Goal: Minimal or absence of nausea and vomiting  4/17/2024 2213 by Maci Reyes LPN  Outcome: Progressing  4/17/2024 1013 by Valeria Sunshine RN  Outcome: Progressing     Problem: ABCDS Injury Assessment  Goal: Absence of physical injury  4/17/2024 2213 by Maci Reyes LPN  Outcome: Progressing  4/17/2024 1013 by  Valeria Sunshine, RN  Outcome: Progressing     Problem: Physical Therapy - Adult  Goal: By Discharge: Performs mobility at highest level of function for planned discharge setting.  See evaluation for individualized goals.  Description: FUNCTIONAL STATUS PRIOR TO ADMISSION: Pt limited historian due to confusion at time of evaluation. Reports indep amb with use of rollator at times. Admits to spending increased time in bed; per roommate, pt only gets up to smoke and is unable to/ does not adequately care for self    HOME SUPPORT PRIOR TO ADMISSION: Pt lives with several roommates; states that he may be able to return home with his sister    Physical Therapy Goals  Initiated 4/17/2024  1.  Patient will move from supine to sit and sit to supine in bed with independence within 7 day(s).    2.  Patient will perform sit to stand with modified indep within 7 day(s).  3.  Patient will transfer from bed to chair and chair to bed with supervision/set-up using the least restrictive device within 7 day(s).  4.  Patient will ambulate with supervision/set-up for 100 feet with the least restrictive device within 7 day(s).   5.  Patient will ascend/descend 5 stairs with bilat handrail(s) with contact guard assist within 7 day(s).   4/17/2024 1048 by Andreina Bower, PT  Outcome: Progressing     Problem: Respiratory - Adult  Goal: Achieves optimal ventilation and oxygenation  4/17/2024 2213 by Maci Reyes LPN  Outcome: Progressing  4/17/2024 1456 by Noelle Guallpa, RT  Outcome: Progressing

## 2024-04-18 NOTE — PROGRESS NOTES
End of Shift Note     Bedside shift change report given to , RN  (oncoming nurse) by Valeria Sunshine RN .          Shift worked: Nights   Shift summary and any significant changes:     none         Concerns for physician to address: See notes above   Zone phone for oncoming shift:   [phone]      Patient Information  Anshu Bonilla Jr.  79 y.o.  4/16/2024  3:49 PM by Rasheeda Palma MD. Anshu Bonilla Jr. was admitted from [facility]     Problem List       Patient Active Problem List     Diagnosis Date Noted    AMS (altered mental status) 04/17/2024    Bilateral carotid artery stenosis 02/09/2024    Syncope and collapse 02/08/2024    S/P CABG x 4 04/06/2013    CAD (coronary artery disease) 04/03/2013      Past Medical History        Past Medical History:   Diagnosis Date    Aneurysm (HCC)       abdominal    Arthritis      CAD (coronary artery disease)       heart attack    Heart failure (HCC)       CHF    Ill-defined condition 12/2017     nuclear stress test - led to catherization being done    Ill-defined condition       pre-COPD    Ill-defined condition       pre-diabetes    Pacemaker       [employer]            Core Measures:  CVA: N/A  CHF: yes  PNA: N/A     Activity:  Level of Assistance: Standby assist, set-up cues, supervision of patient - no hands on  Number times ambulated in hallways past shift: 0       Number of times OOB to chair past shift: 1     Cardiac:   Cardiac Monitoring: yes, portable     Access:   Current line(s): PIV     Respiratory:   O2 Device: None (Room air)     GI:  Last BM (including prior to admit): 04/17/24  Current diet:  ADULT DIET; Regular  Tolerating current diet: Yes     Pain Management:   Patient states pain is manageable on current regimen: N/A     Skin:  Rashard Scale Score: 19  Interventions: speciality bed  Pressure injury: no    Patient Safety:  Fall Score: Romero Total Score: 60  Interventions: bed alarm  Self-release roll belt: No  Dexterity to release roll belt: N/A   (must

## 2024-04-18 NOTE — CARE COORDINATION
Transition of Care Plan:    RUR: 20% (high RUR)  Prior Level of Functioning: Needing some assistance  Disposition: SNF prior to returning home  If SNF or IPR: Date FOC offered: 04/17/24  Date FOC received: 04/17/24  Accepting facility: [facility]  Date authorization started with reference number:   Date authorization received and expires:   Follow up appointments: defer to SNF   DME needed: defer to SNF  Transportation at discharge: BLS likely needed at d/c  IM/IMM Medicare/ letter given: 2nd needed prior to d/c  Is patient a [place] and connected with [facility]? N/a   If yes, was [place] transfer form completed and [facility] notified? N/a  Caregiver Contact: VIVI Aaron  Discharge Caregiver contacted prior to discharge? CM to contact  Care Conference needed? Not at this time  Barriers to discharge:  3 midnight stay     0854 - Plan to transition to SNF. Referral to [facility] pending. CM will follow-up with facility and request back-up option from POA.Pt will meet 3 midnight stay 04/20/2024.    1149 - [facility] verbally accepted, will plan to have bed ready on 04/20.    CHATO Petersen  Care Management  [facility]  [phone]

## 2024-04-18 NOTE — PROGRESS NOTES
End of Shift Note     Bedside shift change report given to RANDY Lester  (oncoming nurse) by Valeria Sunshine RN .          Shift worked: Nights   Shift summary and any significant changes:     B/P is high, MD was notified and is aware      Concerns for physician to address: See notes above   Zone phone for oncoming shift:   [phone]      Patient Information  Anshu Bonilla Jr.  79 y.o.  4/16/2024  3:49 PM by Rasheeda Palma MD. Anshu Bonilla Jr. was admitted from [facility]     Problem List       Patient Active Problem List     Diagnosis Date Noted    AMS (altered mental status) 04/17/2024    Bilateral carotid artery stenosis 02/09/2024    Syncope and collapse 02/08/2024    S/P CABG x 4 04/06/2013    CAD (coronary artery disease) 04/03/2013      Past Medical History        Past Medical History:   Diagnosis Date    Aneurysm (HCC)       abdominal    Arthritis      CAD (coronary artery disease)       heart attack    Heart failure (HCC)       CHF    Ill-defined condition 12/2017     nuclear stress test - led to catherization being done    Ill-defined condition       pre-COPD    Ill-defined condition       pre-diabetes    Pacemaker       [employer]            Core Measures:  CVA: N/A  CHF: yes  PNA: N/A     Activity:  Level of Assistance: Standby assist, set-up cues, supervision of patient - no hands on  Number times ambulated in hallways past shift: 0       Number of times OOB to chair past shift: 1     Cardiac:   Cardiac Monitoring: yes, portable     Access:   Current line(s): PIV     Respiratory:   O2 Device: None (Room air)     GI:  Last BM (including prior to admit): 04/17/24  Current diet:  ADULT DIET; Regular  Tolerating current diet: Yes     Pain Management:   Patient states pain is manageable on current regimen: N/A     Skin:  Rashard Scale Score: 19  Interventions: speciality bed  Pressure injury: no    Patient Safety:  Fall Score: Romero Total Score: 60  Interventions: bed alarm  Self-release roll belt:  Adult

## 2024-04-19 LAB
ALBUMIN SERPL-MCNC: 3.4 G/DL (ref 3.5–5)
ALBUMIN/GLOB SERPL: 1 (ref 1.1–2.2)
ALP SERPL-CCNC: 81 U/L (ref 45–117)
ALT SERPL-CCNC: 13 U/L (ref 12–78)
ANION GAP SERPL CALC-SCNC: 3 MMOL/L (ref 5–15)
AST SERPL-CCNC: 11 U/L (ref 15–37)
BASOPHILS # BLD: 0.1 K/UL (ref 0–0.1)
BASOPHILS NFR BLD: 1 % (ref 0–1)
BILIRUB SERPL-MCNC: 0.5 MG/DL (ref 0.2–1)
BUN SERPL-MCNC: 22 MG/DL (ref 6–20)
BUN/CREAT SERPL: 15 (ref 12–20)
CALCIUM SERPL-MCNC: 9.2 MG/DL (ref 8.5–10.1)
CHLORIDE SERPL-SCNC: 107 MMOL/L (ref 97–108)
CO2 SERPL-SCNC: 29 MMOL/L (ref 21–32)
CREAT SERPL-MCNC: 1.46 MG/DL (ref 0.7–1.3)
DIFFERENTIAL METHOD BLD: ABNORMAL
EOSINOPHIL # BLD: 0.3 K/UL (ref 0–0.4)
EOSINOPHIL NFR BLD: 3 % (ref 0–7)
ERYTHROCYTE [DISTWIDTH] IN BLOOD BY AUTOMATED COUNT: 12.6 % (ref 11.5–14.5)
GLOBULIN SER CALC-MCNC: 3.5 G/DL (ref 2–4)
GLUCOSE SERPL-MCNC: 107 MG/DL (ref 65–100)
HCT VFR BLD AUTO: 41.7 % (ref 36.6–50.3)
HGB BLD-MCNC: 13.9 G/DL (ref 12.1–17)
IMM GRANULOCYTES # BLD AUTO: 0.1 K/UL (ref 0–0.04)
IMM GRANULOCYTES NFR BLD AUTO: 1 % (ref 0–0.5)
LYMPHOCYTES # BLD: 2.3 K/UL (ref 0.8–3.5)
LYMPHOCYTES NFR BLD: 23 % (ref 12–49)
MCH RBC QN AUTO: 32.4 PG (ref 26–34)
MCHC RBC AUTO-ENTMCNC: 33.3 G/DL (ref 30–36.5)
MCV RBC AUTO: 97.2 FL (ref 80–99)
MONOCYTES # BLD: 0.8 K/UL (ref 0–1)
MONOCYTES NFR BLD: 8 % (ref 5–13)
NEUTS SEG # BLD: 6.7 K/UL (ref 1.8–8)
NEUTS SEG NFR BLD: 64 % (ref 32–75)
NRBC # BLD: 0 K/UL (ref 0–0.01)
NRBC BLD-RTO: 0 PER 100 WBC
PLATELET # BLD AUTO: 194 K/UL (ref 150–400)
PMV BLD AUTO: 12.1 FL (ref 8.9–12.9)
POTASSIUM SERPL-SCNC: 4.5 MMOL/L (ref 3.5–5.1)
PROT SERPL-MCNC: 6.9 G/DL (ref 6.4–8.2)
RBC # BLD AUTO: 4.29 M/UL (ref 4.1–5.7)
SODIUM SERPL-SCNC: 139 MMOL/L (ref 136–145)
WBC # BLD AUTO: 10.3 K/UL (ref 4.1–11.1)

## 2024-04-19 PROCEDURE — 2580000003 HC RX 258: Performed by: INTERNAL MEDICINE

## 2024-04-19 PROCEDURE — 6370000000 HC RX 637 (ALT 250 FOR IP): Performed by: GENERAL ACUTE CARE HOSPITAL

## 2024-04-19 PROCEDURE — 6370000000 HC RX 637 (ALT 250 FOR IP): Performed by: INTERNAL MEDICINE

## 2024-04-19 PROCEDURE — 1100000000 HC RM PRIVATE

## 2024-04-19 PROCEDURE — 36415 COLL VENOUS BLD VENIPUNCTURE: CPT

## 2024-04-19 PROCEDURE — 80053 COMPREHEN METABOLIC PANEL: CPT

## 2024-04-19 PROCEDURE — 97116 GAIT TRAINING THERAPY: CPT

## 2024-04-19 PROCEDURE — 97530 THERAPEUTIC ACTIVITIES: CPT

## 2024-04-19 PROCEDURE — 6360000002 HC RX W HCPCS: Performed by: GENERAL ACUTE CARE HOSPITAL

## 2024-04-19 PROCEDURE — 2580000003 HC RX 258: Performed by: GENERAL ACUTE CARE HOSPITAL

## 2024-04-19 PROCEDURE — 85025 COMPLETE CBC W/AUTO DIFF WBC: CPT

## 2024-04-19 RX ORDER — LOSARTAN POTASSIUM 25 MG/1
50 TABLET ORAL DAILY
Status: DISCONTINUED | OUTPATIENT
Start: 2024-04-20 | End: 2024-04-20 | Stop reason: HOSPADM

## 2024-04-19 RX ORDER — SODIUM CHLORIDE 9 MG/ML
INJECTION, SOLUTION INTRAVENOUS CONTINUOUS
Status: DISCONTINUED | OUTPATIENT
Start: 2024-04-19 | End: 2024-04-19

## 2024-04-19 RX ORDER — PANTOPRAZOLE SODIUM 40 MG/1
40 TABLET, DELAYED RELEASE ORAL
Status: DISCONTINUED | OUTPATIENT
Start: 2024-04-20 | End: 2024-04-20 | Stop reason: HOSPADM

## 2024-04-19 RX ADMIN — SODIUM CHLORIDE, PRESERVATIVE FREE 10 ML: 5 INJECTION INTRAVENOUS at 09:39

## 2024-04-19 RX ADMIN — METOPROLOL SUCCINATE 25 MG: 25 TABLET, EXTENDED RELEASE ORAL at 09:37

## 2024-04-19 RX ADMIN — LOSARTAN POTASSIUM 100 MG: 100 TABLET, FILM COATED ORAL at 09:37

## 2024-04-19 RX ADMIN — Medication 6000 UNITS: at 09:35

## 2024-04-19 RX ADMIN — PAROXETINE HYDROCHLORIDE 20 MG: 20 TABLET, FILM COATED ORAL at 09:37

## 2024-04-19 RX ADMIN — ASPIRIN 81 MG: 81 TABLET, COATED ORAL at 09:38

## 2024-04-19 RX ADMIN — ENOXAPARIN SODIUM 40 MG: 100 INJECTION SUBCUTANEOUS at 09:39

## 2024-04-19 RX ADMIN — ATORVASTATIN CALCIUM 40 MG: 40 TABLET, FILM COATED ORAL at 20:01

## 2024-04-19 RX ADMIN — SODIUM CHLORIDE: 9 INJECTION, SOLUTION INTRAVENOUS at 11:58

## 2024-04-19 RX ADMIN — SODIUM CHLORIDE, PRESERVATIVE FREE 10 ML: 5 INJECTION INTRAVENOUS at 20:01

## 2024-04-19 NOTE — PROGRESS NOTES
Hospitalist Progress Note    NAME:   Anshu Bonilla Jr.   : 1945   MRN: [id number]     Date/Time: 2024 8:15 PM  Patient PCP: George Carr MD    Estimated discharge date:  Barriers: medical improvement       Assessment / Plan:   Dementia  Mental/functional decline  Generalized weakness  Vit D deeficiency  MRI in February showed White matter T2 hyperintensities favoring chronic  microangiopathy.  Patient may have worsening dementia.  Neurology consult noted , signed off   Brain MRI cancelled by neuro  Based on prior records, roommate reported seizure at home, prior EEG negative, will repeat EEG   TSH wnl , vitamin D 10.8, B12 and folate wnl    consult/PT/OT consult  Addendum: POA called back, and he reported that pt has been laying in bed most of the day, no fever/chills, not interactive, chest pain related to AICD. POA can't care for pt anymore.   Pt been vomiting the last few days,  CT chest  1.  No evidence of pulmonary embolism.  2.  Small nodules in the upper lobes bilaterally, possibly  infectious/inflammatory. The largest nodule measures 7 mm in the left upper  lobe. Consider 3-month follow-up chest CT to evaluate for resolution.  3.  Cholelithiasis without gallbladder inflammation.     get abdominal US   Atypical chest pain, related to AICD  Negative troponin  Negative EKG  Likely musculoskeletal     Incidental findings  Small nodules in the upper lobes bilaterally, possibly infectious/inflammatory. The largest nodule measures 7 mm in the left upper  lobe. Consider 3-month follow-up chest CT to evaluate for resolution.  Cholelithiasis without gallbladder inflammation.  Follow-up with PCP     CAD  Status post CABG  Hypertension/hyperlipidemia  Status post abdominal aortic aneurysm, status post EVAR  Chronic systolic heart failure EF 25%,  Type 2 diabetes  GERD  Tobacco use  Left renal artery stenosis  CKD 3  Resume home medications        Medical Decision Making:    MG 2.0   PHOS 3.3   BILITOT 0.3   AST 12*   ALT 14       Signed: Marla Gastelum MD

## 2024-04-19 NOTE — CARE COORDINATION
Transition of Care Plan:     RUR: 18% (moderate RUR)  Prior Level of Functioning: Needing some assistance  Disposition: SNF prior to returning home  If SNF or IPR: Date FOC offered: 04/17/24  Date FOC received: 04/17/24  Accepting facility: [facility]  Date authorization started with reference number:   Date authorization received and expires:   Follow up appointments: defer to SNF   DME needed: defer to SNF  Transportation at discharge: BLS likely needed at d/c  IM/IMM Medicare/ letter given: 2nd given 04/19  Is patient a  and connected with [facility]? N/a              If yes, was  transfer form completed and [facility] notified? N/a  Caregiver Contact: VIVI Aaron; [phone]  Discharge Caregiver contacted prior to discharge? CM to contact  Care Conference needed? Not at this time  Barriers to discharge:  3 midnight stay     0838 - [facility] accepted via [facility], moved to selected. Plan to transition there in the AM, CM will request bed info and arrange transport.     1110 - Transport confirmed for 1100 on 04/20/24. Awaiting bed info from liaison. POA updated and is in agreement. Unit CM will hand off to weekend CM for AM d/c.    1153 - Bed # 48A. Weekend CM to send DC Summary with pt once complete.     Transition of Care Plan to SNF/Rehab    Communication to Patient/Family:  Met with patient and family and they are agreeable to the transition plan. The Plan for Transition of Care is related to the following treatment goals: SNF    The Patient and/or patient representative was provided with a choice of provider and agrees  with the discharge plan.      Yes [x] No []    A Freedom of choice list was provided with basic dialogue that supports the patient's individualized plan of care/goals and shares the quality data associated with the providers.       Yes [x] No []    SNF/Rehab Transition:  Patient has been accepted to [facility] SNF/Rehab and meets criteria for admission.   Patient will

## 2024-04-19 NOTE — PROGRESS NOTES
Hospitalist Progress Note    NAME:   Anshu Bonilla Jr.   : 1945   MRN: [id number]     Date/Time: 2024 3:21 PM  Patient PCP: George Carr MD    Estimated discharge date:  Barriers: medical improvement       Assessment / Plan:   Dementia  Mental/functional decline  Generalized weakness  Vit D deeficiency  MRI in February showed White matter T2 hyperintensities favoring chronic  microangiopathy.  Patient may have worsening dementia.  Neurology consult noted , signed off   Brain MRI cancelled by neuro  Based on prior records, roommate reported seizure at home, prior EEG negative, will repeat EEG   TSH wnl , vitamin D 10.8, B12 and folate wnl    consult/PT/OT consult  Addendum: POA called back, and he reported that pt has been laying in bed most of the day, no fever/chills, not interactive, chest pain related to AICD. POA can't care for pt anymore.   Pt been vomiting the last few days,  CT chest  1.  No evidence of pulmonary embolism.  2.  Small nodules in the upper lobes bilaterally, possibly  infectious/inflammatory. The largest nodule measures 7 mm in the left upper  lobe. Consider 3-month follow-up chest CT to evaluate for resolution.  3.  Cholelithiasis without gallbladder inflammation.     get abdominal US   Abdomen ultrasound showed cholelithiasis but no biliary obstruction  Acute kidney injury  Creatinine 1.46, started on IV fluid for few hours ,  will repeat BMP  Atypical chest pain, related to AICD  Negative troponin  Negative EKG  Likely musculoskeletal     Incidental findings  Small nodules in the upper lobes bilaterally, possibly infectious/inflammatory. The largest nodule measures 7 mm in the left upper  lobe. Consider 3-month follow-up chest CT to evaluate for resolution.  Cholelithiasis without gallbladder inflammation.  Follow-up with PCP     CAD  Status post CABG  Hypertension/hyperlipidemia  Status post abdominal aortic aneurysm, status post EVAR  Chronic      LABS:  I reviewed today's most current labs and imaging studies.  Pertinent labs include:  Recent Labs     04/16/24  1607 04/19/24  0350   WBC 12.3* 10.3   HGB 12.6 13.9   HCT 38.5 41.7    194       Recent Labs     04/16/24  1607 04/19/24  0350    139   K 5.0 4.5   * 107   CO2 34* 29   GLUCOSE 79 107*   BUN 26* 22*   CREATININE 1.25 1.46*   CALCIUM 8.9 9.2   MG 2.0  --    PHOS 3.3  --    BILITOT 0.3 0.5   AST 12* 11*   ALT 14 13         Signed: Marla Gastelum MD

## 2024-04-19 NOTE — PLAN OF CARE
Problem: Physical Therapy - Adult  Goal: By Discharge: Performs mobility at highest level of function for planned discharge setting.  See evaluation for individualized goals.  Description: FUNCTIONAL STATUS PRIOR TO ADMISSION: Pt limited historian due to confusion at time of evaluation. Reports indep amb with use of rollator at times. Admits to spending increased time in bed; per roommate, pt only gets up to smoke and is unable to/ does not adequately care for self    HOME SUPPORT PRIOR TO ADMISSION: Pt lives with several roommates; states that he may be able to return home with his sister    Physical Therapy Goals  Initiated 4/17/2024  1.  Patient will move from supine to sit and sit to supine in bed with independence within 7 day(s).    2.  Patient will perform sit to stand with modified indep within 7 day(s).  3.  Patient will transfer from bed to chair and chair to bed with supervision/set-up using the least restrictive device within 7 day(s).  4.  Patient will ambulate with supervision/set-up for 100 feet with the least restrictive device within 7 day(s).   5.  Patient will ascend/descend 5 stairs with bilat handrail(s) with contact guard assist within 7 day(s).   Outcome: Progressing     PHYSICAL THERAPY TREATMENT    Patient: Anshu Bonilla Jr. (79 y.o. male)  Date: 4/19/2024  Diagnosis: Transient alteration of awareness [R40.4]  AMS (altered mental status) [R41.82]  Weakness [R53.1] AMS (altered mental status)      Precautions: Fall Risk, Bed Alarm                      ASSESSMENT:  Patient continues to benefit from skilled PT services and is progressing towards goals. Pt transferred supine > sit independently, sit <> stand from EOB with RW and CGA. Pt ambulated in hallway with RW, required cues for upright posture, though unable to maintain with additional cueing. Pt noted with shuffling gait, decreased B step length, downward gaze. Pt returned to sit in bedside chair, deferred further ambulation. Pt left  seated in bedside chair in NAD, all lines intact.          PLAN:  Patient continues to benefit from skilled intervention to address the above impairments.  Continue treatment per established plan of care.    Recommendation for discharge: (in order for the patient to meet his/her long term goals): Therapy up to 5 days/week in Skilled nursing facility    Other factors to consider for discharge: poor safety awareness, impaired cognition, and high risk for falls    IF patient discharges home will need the following DME: rolling walker       SUBJECTIVE:   Patient stated, \"I haven't slept much.\"    OBJECTIVE DATA SUMMARY:   Critical Behavior:          Functional Mobility Training:  Bed Mobility:  Bed Mobility Training  Bed Mobility Training: Yes  Rolling: Independent  Supine to Sit: Independent  Transfers:  Transfer Training  Transfer Training: Yes (with RW)  Interventions: Verbal cues;Safety awareness training  Sit to Stand: Contact-guard assistance  Stand to Sit: Contact-guard assistance  Bed to Chair: Contact-guard assistance  Balance:  Balance  Sitting: Intact  Standing: Impaired  Standing - Static: Constant support;Good  Standing - Dynamic: Constant support;Fair   Ambulation/Gait Training:     Gait  Gait Training: Yes  Overall Level of Assistance: Contact-guard assistance  Distance (ft): 25 Feet (x4)  Assistive Device: Gait belt;Walker, rolling  Interventions: Verbal cues;Tactile cues;Safety awareness training;Manual cues  Base of Support: Other (comment)  Speed/Yesica: Pace decreased (< 100 feet/min)  Step Length: Left shortened;Right shortened  Gait Abnormalities: Decreased step clearance;Trunk sway increased (rounded shoulders, RW ahead of ALEX)  Neuro Re-Education:                    Pain Ratin/10   Pain Intervention(s):       Activity Tolerance:   Good and requires rest breaks    After treatment:   Patient left in no apparent distress sitting up in chair, Call bell within reach, Bed/ chair alarm activated,

## 2024-04-19 NOTE — PROGRESS NOTES
End of Shift Note     Bedside shift change report given to RANDY Benson  (oncoming nurse) by RANDY Go .          Shift worked: days   Shift summary and any significant changes:     IV fluid started today. Possible discharge tomorrow. No acute events during the day.    Concerns for physician to address: none   [facility] phone for oncoming shift:   [phone]      Patient Information  Anshu Bonilla Jr.  79 y.o.  4/16/2024  3:49 PM by Rasheeda Palma MD. Anshu Bonilla Jr. was admitted from [facility]     Problem List       Patient Active Problem List     Diagnosis Date Noted    AMS (altered mental status) 04/17/2024    Bilateral carotid artery stenosis 02/09/2024    Syncope and collapse 02/08/2024    S/P CABG x 4 04/06/2013    CAD (coronary artery disease) 04/03/2013      Past Medical History        Past Medical History:   Diagnosis Date    Aneurysm (HCC)       abdominal    Arthritis      CAD (coronary artery disease)       heart attack    Heart failure (HCC)       CHF    Ill-defined condition 12/2017     nuclear stress test - led to catherization being done    Ill-defined condition       pre-COPD    Ill-defined condition       pre-diabetes    Pacemaker       [employer]            Core Measures:  CVA: N/A  CHF: yes  PNA: N/A     Activity:  Level of Assistance: Standby assist, set-up cues, supervision of patient - no hands on  Number times ambulated in hallways past shift: 0       Number of times OOB to chair past shift: 1     Cardiac:   Cardiac Monitoring: yes, portable     Access:   Current line(s): PIV     Respiratory:   O2 Device: None (Room air)     GI:  Last BM (including prior to admit): 04/17/24  Current diet:  ADULT DIET; Regular  Tolerating current diet: Yes     Pain Management:   Patient states pain is manageable on current regimen: N/A     Skin:  Rashard Scale Score: 19  Interventions: speciality bed  Pressure injury: no    Patient Safety:  Fall Score: Romero Total Score: 60  Interventions: bed alarm  Self-release  roll belt: No  Dexterity to release roll belt: N/A   (must document dexterity  here by stating Yes or No here, otherwise this is a restraint and must follow restraint documentation policy.)     DVT prophylaxis:  DVT prophylaxis: meds     Active Consults:  IP CONSULT TO CASE MANAGEMENT  IP CONSULT TO NEUROLOGY     Length of Stay:  Expected LOS: 2  Actual LOS: 0

## 2024-04-19 NOTE — PLAN OF CARE
Continue POC    Problem: Discharge Planning  Goal: Discharge to home or other facility with appropriate resources  4/18/2024 2143 by Gabriela Avalos, RN  Outcome: Progressing  Flowsheets (Taken 4/18/2024 2129)  Discharge to home or other facility with appropriate resources: Identify barriers to discharge with patient and caregiver  4/18/2024 0948 by Blizzard, Cherelle, RN  Outcome: Progressing     Problem: Skin/Tissue Integrity  Goal: Absence of new skin breakdown  Description: 1.  Monitor for areas of redness and/or skin breakdown  2.  Assess vascular access sites hourly  3.  Every 4-6 hours minimum:  Change oxygen saturation probe site  4.  Every 4-6 hours:  If on nasal continuous positive airway pressure, respiratory therapy assess nares and determine need for appliance change or resting period.  Outcome: Progressing     Problem: Safety - Adult  Goal: Free from fall injury  Outcome: Progressing  Flowsheets (Taken 4/18/2024 2141)  Free From Fall Injury: Instruct family/caregiver on patient safety     Problem: Chronic Conditions and Co-morbidities  Goal: Patient's chronic conditions and co-morbidity symptoms are monitored and maintained or improved  Outcome: Progressing  Flowsheets (Taken 4/18/2024 2129)  Care Plan - Patient's Chronic Conditions and Co-Morbidity Symptoms are Monitored and Maintained or Improved: Monitor and assess patient's chronic conditions and comorbid symptoms for stability, deterioration, or improvement     Problem: Neurosensory - Adult  Goal: Achieves stable or improved neurological status  Outcome: Progressing  Goal: Achieves maximal functionality and self care  Outcome: Progressing     Problem: Gastrointestinal - Adult  Goal: Minimal or absence of nausea and vomiting  Outcome: Progressing  Flowsheets (Taken 4/18/2024 2129)  Minimal or absence of nausea and vomiting: Administer IV fluids as ordered to ensure adequate hydration     Problem: ABCDS Injury Assessment  Goal: Absence of physical

## 2024-04-19 NOTE — PROGRESS NOTES
End of Shift Note     Bedside shift change report given to RANDY Go  (oncoming nurse) by Valeria Sunshine RN .          Shift worked: nights   Shift summary and any significant changes:     No acute events   Concerns for physician to address: none   [facility] phone for oncoming shift:         Patient Information  Anshu Bonilla Jr.  79 y.o.  4/16/2024  3:49 PM by Rasheeda Palma MD. Anshu Bonilla Jr. was admitted from [facility]     Problem List       Patient Active Problem List     Diagnosis Date Noted    AMS (altered mental status) 04/17/2024    Bilateral carotid artery stenosis 02/09/2024    Syncope and collapse 02/08/2024    S/P CABG x 4 04/06/2013    CAD (coronary artery disease) 04/03/2013      Past Medical History        Past Medical History:   Diagnosis Date    Aneurysm (HCC)       abdominal    Arthritis      CAD (coronary artery disease)       heart attack    Heart failure (HCC)       CHF    Ill-defined condition 12/2017     nuclear stress test - led to catherization being done    Ill-defined condition       pre-COPD    Ill-defined condition       pre-diabetes    Pacemaker       [facility]            Core Measures:  CVA: N/A  CHF: yes  PNA: N/A     Activity:  Level of Assistance: Standby assist, set-up cues, supervision of patient - no hands on  Number times ambulated in hallways past shift: 0       Number of times OOB to chair past shift: 1     Cardiac:   Cardiac Monitoring: yes, portable     Access:   Current line(s): PIV     Respiratory:   O2 Device: None (Room air)     GI:  Last BM (including prior to admit): 04/17/24  Current diet:  ADULT DIET; Regular  Tolerating current diet: Yes     Pain Management:   Patient states pain is manageable on current regimen: N/A     Skin:  Rashard Scale Score: 19  Interventions: speciality bed  Pressure injury: no    Patient Safety:  Fall Score: Romero Total Score: 60  Interventions: bed alarm  Self-release roll belt: No  Dexterity to release roll belt: N/A   (must

## 2024-04-20 VITALS
RESPIRATION RATE: 20 BRPM | HEART RATE: 59 BPM | SYSTOLIC BLOOD PRESSURE: 163 MMHG | HEIGHT: 71 IN | WEIGHT: 154.32 LBS | BODY MASS INDEX: 21.6 KG/M2 | OXYGEN SATURATION: 98 % | TEMPERATURE: 97.5 F | DIASTOLIC BLOOD PRESSURE: 92 MMHG

## 2024-04-20 LAB
ANION GAP SERPL CALC-SCNC: 3 MMOL/L (ref 5–15)
BUN SERPL-MCNC: 23 MG/DL (ref 6–20)
BUN/CREAT SERPL: 16 (ref 12–20)
CALCIUM SERPL-MCNC: 8.7 MG/DL (ref 8.5–10.1)
CHLORIDE SERPL-SCNC: 107 MMOL/L (ref 97–108)
CO2 SERPL-SCNC: 30 MMOL/L (ref 21–32)
CREAT SERPL-MCNC: 1.41 MG/DL (ref 0.7–1.3)
EKG ATRIAL RATE: 60 BPM
EKG DIAGNOSIS: NORMAL
EKG P-R INTERVAL: 190 MS
EKG Q-T INTERVAL: 436 MS
EKG QRS DURATION: 100 MS
EKG QTC CALCULATION (BAZETT): 436 MS
EKG R AXIS: -20 DEGREES
EKG T AXIS: 126 DEGREES
EKG VENTRICULAR RATE: 60 BPM
GLUCOSE SERPL-MCNC: 148 MG/DL (ref 65–100)
POTASSIUM SERPL-SCNC: 3.8 MMOL/L (ref 3.5–5.1)
SODIUM SERPL-SCNC: 140 MMOL/L (ref 136–145)

## 2024-04-20 PROCEDURE — 36415 COLL VENOUS BLD VENIPUNCTURE: CPT

## 2024-04-20 PROCEDURE — 80048 BASIC METABOLIC PNL TOTAL CA: CPT

## 2024-04-20 PROCEDURE — 6360000002 HC RX W HCPCS: Performed by: GENERAL ACUTE CARE HOSPITAL

## 2024-04-20 PROCEDURE — 6370000000 HC RX 637 (ALT 250 FOR IP): Performed by: INTERNAL MEDICINE

## 2024-04-20 PROCEDURE — 6370000000 HC RX 637 (ALT 250 FOR IP): Performed by: GENERAL ACUTE CARE HOSPITAL

## 2024-04-20 PROCEDURE — 2580000003 HC RX 258: Performed by: GENERAL ACUTE CARE HOSPITAL

## 2024-04-20 RX ORDER — LOSARTAN POTASSIUM 50 MG/1
50 TABLET ORAL DAILY
Qty: 30 TABLET | Refills: 0 | Status: SHIPPED | OUTPATIENT
Start: 2024-04-20 | End: 2024-04-20

## 2024-04-20 RX ORDER — CHOLECALCIFEROL (VITAMIN D3) 50 MCG
TABLET ORAL
Qty: 42 TABLET | Refills: 0 | Status: SHIPPED | OUTPATIENT
Start: 2024-04-21 | End: 2024-05-25

## 2024-04-20 RX ORDER — LOSARTAN POTASSIUM 50 MG/1
100 TABLET ORAL DAILY
Qty: 60 TABLET | Refills: 0 | Status: SHIPPED | OUTPATIENT
Start: 2024-04-20 | End: 2024-05-20

## 2024-04-20 RX ADMIN — PAROXETINE HYDROCHLORIDE 20 MG: 20 TABLET, FILM COATED ORAL at 08:23

## 2024-04-20 RX ADMIN — PANTOPRAZOLE SODIUM 40 MG: 40 TABLET, DELAYED RELEASE ORAL at 05:53

## 2024-04-20 RX ADMIN — Medication 6000 UNITS: at 08:23

## 2024-04-20 RX ADMIN — METOPROLOL SUCCINATE 25 MG: 25 TABLET, EXTENDED RELEASE ORAL at 08:23

## 2024-04-20 RX ADMIN — ASPIRIN 81 MG: 81 TABLET, COATED ORAL at 08:23

## 2024-04-20 RX ADMIN — LOSARTAN POTASSIUM 50 MG: 25 TABLET, FILM COATED ORAL at 08:23

## 2024-04-20 RX ADMIN — SODIUM CHLORIDE, PRESERVATIVE FREE 10 ML: 5 INJECTION INTRAVENOUS at 08:24

## 2024-04-20 ASSESSMENT — PAIN SCALES - GENERAL: PAINLEVEL_OUTOF10: 0

## 2024-04-20 NOTE — DISCHARGE INSTRUCTIONS
HOSPITALIST DISCHARGE INSTRUCTIONS    NAME: Anshu Bonilla Jr.   :  1945   MRN:  [id number]     Date/Time:  2024 10:00 AM    ADMIT DATE: 2024   DISCHARGE DATE: 2024   Dementia  Mental/functional decline  Generalized weakness  Vit D deeficiency  Acute kidney injury  Atypical chest pain, related to AICD  Small nodules in the upper lobes bilaterally, possibly infectious/inflammatory. The largest nodule measures 7 mm in the left upper  lobe. Consider 3-month follow-up chest CT to evaluate for resolution.  Cholelithiasis without gallbladder inflammation.  CAD  Status post CABG  Hypertension/hyperlipidemia  Status post abdominal aortic aneurysm, status post EVAR  Chronic systolic heart failure EF 25%,  Type 2 diabetes  GERD  Tobacco use  Left renal artery stenosis  CKD 3    Attending Physician: Marla Gastelum MD      Medications: Per above medication reconciliation.    Pain Management: per above medications    Recommended diet: cardiac diet     Recommended activity: activity as tolerated    Wound care: None    Indwelling devices:  None    Supplemental Oxygen: None    Required Lab work: Per SNF routine    Glucose management:  None    Code status: full

## 2024-04-20 NOTE — DISCHARGE SUMMARY
Discharge Summary    Name: Anshu Bonilla Jr.  [id number]  YOB: 1945 (Age: 79 y.o.)   Date of Admission: 4/16/2024  Date of Discharge: 4/20/2024  Attending Physician: Marla Gastelum MD    Discharge Diagnosis:   Dementia  Mental/functional decline  Generalized weakness  Vit D deeficiency  Acute kidney injury  Atypical chest pain, related to AICD  Small nodules in the upper lobes bilaterally, possibly infectious/inflammatory. The largest nodule measures 7 mm in the left upper  lobe. Consider 3-month follow-up chest CT to evaluate for resolution.  Cholelithiasis without gallbladder inflammation.  CAD  Status post CABG  Hypertension/hyperlipidemia  Status post abdominal aortic aneurysm, status post EVAR  Chronic systolic heart failure EF 25%,  Type 2 diabetes  GERD  Tobacco use  Left renal artery stenosis  CKD 3    Consultations:  IP CONSULT TO CASE MANAGEMENT  IP CONSULT TO NEUROLOGY      Brief Admission History/Reason for Admission Per Marla Gastelum MD:   HIEF COMPLAINT: worsening mental status      HISTORY OF PRESENT ILLNESS:     Anshu Bonilla is a 79 y.o.  male with PMHx significant for  has a past medical history of Aneurysm (HCC), Arthritis, CAD (coronary artery disease), Heart failure (HCC), Ill-defined condition, Ill-defined condition, Ill-defined condition, and Pacemaker.      As per ER \"Anshu Bonilla Jr. is a 79 y.o. male with past medical history of coronary artery disease status post recent left heart catheterization in February 2024 as well as ICD placement 2024 for severe bradycardia and concerns for cardiogenic syncope presents to the emergency departments with EMS.  EMS was called by his roommate Khadijah Mackaynett who provided most of the story to me.  He told me that the patient will get out of bed for the past 2 days except to smoke cigarettes.  He has been filling up his urinal and want him to get and he has had intermittent vomiting for the past few

## 2024-04-20 NOTE — CARE COORDINATION
UPDATE: CM received an update that [facility] did NOT have the patient scheduled for transport today and [facility] is booked and would be unable to accommodate the transport request. CM spoke with nursing supervisor and received approval for [facility] transport. [facility] will be transporting the patient at 12:45 PM.     Transition of Care Plan:    RUR: 18%-\"Moderate Risk\"  Prior Level of Functioning: Needing some assistance with his ADLs  Disposition: SNF prior to returning home   If SNF or IPR: Date FOC offered: 4/17/24  Date FOC received: 4/17/24  [facility]   Date authorization started with reference number: N/A  Follow up appointments: PCP & Specialists as indicated   DME needed: Defer to SNF  Transportation at discharge: [facility] at 11 AM  IM/IMM Medicare/ letter given: 2nd IM letter given & signed on 4/19/24  Is patient a  and connected with [facility]? N/A   If yes, was [place] transfer form completed and [facility] notified?   Caregiver Contact: VIVI Aaron, Phone: [phone]  Discharge Caregiver contacted prior to discharge? Yes, FLOR spoke to the patient's POA today, 4/20/24  Care Conference needed? No  Barriers to discharge: N/A, patient is being discharged today, 4/20/24    CM spoke to admissions liaison for Critical [facility] hospital, Rosa Isela (phone: [phone]), and she stated that they are expecting the patient for admission today, 4/20/24. The patient will be going to room #48A. The phone number for report is [phone] and ask for [facility]. The patient will need a copy of his D/C Summary sent with him to Critical access hospital.    CM contacted the patient's POA, Fransisco Daugherty (phone: [phone]), and he is agreeable to the patient's discharge today, 4/20/24.     The patient's 2nd IM letter was given and signed on 4/20/24. From CM perspective, the patient can be discharged.     Casandra Coleman, [facility]  [phone]

## 2024-04-20 NOTE — PROGRESS NOTES
End of Shift Note     Bedside shift change report given to Major RN  (oncoming nurse) by RANDY Benson (Offgoing nurse) .          Shift worked: 7p - 7a   Shift summary and any significant changes:     IV fluid discontinued. Patient awaiting placement. No acute events during the day.    Concerns for physician to address: none   [facility] phone for oncoming shift:   0424      Patient Information  Anshu Bonilla Jr.  79 y.o.  4/16/2024  3:49 PM by Rasheeda Palma MD. Anshu Bonilla Jr. was admitted from [facility]     Problem List       Patient Active Problem List     Diagnosis Date Noted    AMS (altered mental status) 04/17/2024    Bilateral carotid artery stenosis 02/09/2024    Syncope and collapse 02/08/2024    S/P CABG x 4 04/06/2013    CAD (coronary artery disease) 04/03/2013      Past Medical History        Past Medical History:   Diagnosis Date    Aneurysm (HCC)       abdominal    Arthritis      CAD (coronary artery disease)       heart attack    Heart failure (HCC)       CHF    Ill-defined condition 12/2017     nuclear stress test - led to catherization being done    Ill-defined condition       pre-COPD    Ill-defined condition       pre-diabetes    Pacemaker       [employer]            Core Measures:  CVA: N/A  CHF: yes  PNA: N/A     Activity:  Level of Assistance: Standby assist, set-up cues, supervision of patient - no hands on  Number times ambulated in hallways past shift: 0       Number of times OOB to chair past shift: 1     Cardiac:   Cardiac Monitoring: yes, portable     Access:   Current line(s): PIV     Respiratory:   O2 Device: None (Room air)     GI:  Last BM (including prior to admit): 04/17/24  Current diet:  ADULT DIET; Regular  Tolerating current diet: Yes     Pain Management:   Patient states pain is manageable on current regimen: N/A     Skin:  Rashard Scale Score: 19  Interventions: speciality bed  Pressure injury: no    Patient Safety:  Fall Score: Romero Total Score: 60  Interventions: bed  alarm  Self-release roll belt: No     DVT prophylaxis:  DVT prophylaxis: meds     Active Consults:  IP CONSULT TO CASE MANAGEMENT  IP CONSULT TO NEUROLOGY     Length of Stay:  Expected LOS: 2  Actual LOS: 4

## 2024-04-20 NOTE — PLAN OF CARE
Problem: Neurosensory - Adult  Goal: Achieves stable or improved neurological status  4/19/2024 2216 by Ashok Garcia RN  Outcome: Progressing  Flowsheets (Taken 4/19/2024 2216)  Achieves stable or improved neurological status:   Assess for and report changes in neurological status   Initiate measures to prevent increased intracranial pressure  4/19/2024 1010 by Donavan Wiley RN  Outcome: Progressing     Problem: Neurosensory - Adult  Goal: Achieves maximal functionality and self care  4/19/2024 2216 by Ashok Garcia RN  Outcome: Progressing  Flowsheets (Taken 4/19/2024 2216)  Achieves maximal functionality and self care: Monitor swallowing and airway patency with patient fatigue and changes in neurological status  4/19/2024 1010 by Donavan Wiley RN  Outcome: Progressing     Problem: Gastrointestinal - Adult  Goal: Minimal or absence of nausea and vomiting  4/19/2024 2216 by Ashok Garcia RN  Outcome: Progressing  Flowsheets (Taken 4/19/2024 2216)  Minimal or absence of nausea and vomiting:   Administer IV fluids as ordered to ensure adequate hydration   Administer ordered antiemetic medications as needed  4/19/2024 1010 by Donavan Wiley RN  Outcome: Progressing     Problem: ABCDS Injury Assessment  Goal: Absence of physical injury  4/19/2024 2216 by Ashok Garcia RN  Outcome: Progressing  Flowsheets (Taken 4/19/2024 2216)  Absence of Physical Injury: Implement safety measures based on patient assessment  4/19/2024 1010 by Donavan Wiley RN  Outcome: Progressing     Problem: Respiratory - Adult  Goal: Achieves optimal ventilation and oxygenation  4/19/2024 2216 by Ashok Garcia RN  Outcome: Progressing  Flowsheets (Taken 4/19/2024 2216)  Achieves optimal ventilation and oxygenation:   Assess for changes in respiratory status   Assess for changes in mentation and behavior  4/19/2024 1010 by Donavan Wiley RN  Outcome: Progressing

## 2024-04-22 ENCOUNTER — CARE COORDINATION (OUTPATIENT)
Dept: CARE COORDINATION | Age: 79
End: 2024-04-22

## 2024-04-22 NOTE — CARE COORDINATION
SECURE email notification sent to identified IDT members at   [facility].

## 2024-04-23 ENCOUNTER — TELEPHONE (OUTPATIENT)
Age: 79
End: 2024-04-23

## 2024-05-25 ENCOUNTER — APPOINTMENT (OUTPATIENT)
Facility: HOSPITAL | Age: 79
End: 2024-05-25
Payer: MEDICARE

## 2024-05-25 ENCOUNTER — HOSPITAL ENCOUNTER (EMERGENCY)
Facility: HOSPITAL | Age: 79
Discharge: HOME | End: 2024-05-26
Attending: EMERGENCY MEDICINE
Payer: MEDICARE

## 2024-05-25 VITALS
HEART RATE: 60 BPM | SYSTOLIC BLOOD PRESSURE: 142 MMHG | WEIGHT: 150 LBS | TEMPERATURE: 98 F | DIASTOLIC BLOOD PRESSURE: 69 MMHG | BODY MASS INDEX: 21 KG/M2 | HEIGHT: 71 IN | RESPIRATION RATE: 23 BRPM | OXYGEN SATURATION: 100 %

## 2024-05-25 DIAGNOSIS — R50.9 FEVER AND CHILLS: Primary | ICD-10-CM

## 2024-05-25 LAB
ALBUMIN SERPL-MCNC: 3.2 G/DL (ref 3.5–5)
ALBUMIN/GLOB SERPL: 1 (ref 1.1–2.2)
ALP SERPL-CCNC: 67 U/L (ref 45–117)
ALT SERPL-CCNC: 17 U/L (ref 12–78)
ANION GAP SERPL CALC-SCNC: 5 MMOL/L (ref 5–15)
AST SERPL-CCNC: 14 U/L (ref 15–37)
BASOPHILS # BLD: 0.1 K/UL (ref 0–0.1)
BASOPHILS NFR BLD: 1 % (ref 0–1)
BILIRUB SERPL-MCNC: 0.3 MG/DL (ref 0.2–1)
BUN SERPL-MCNC: 31 MG/DL (ref 6–20)
BUN/CREAT SERPL: 17 (ref 12–20)
CALCIUM SERPL-MCNC: 9.1 MG/DL (ref 8.5–10.1)
CHLORIDE SERPL-SCNC: 106 MMOL/L (ref 97–108)
CO2 SERPL-SCNC: 27 MMOL/L (ref 21–32)
COMMENT:: NORMAL
CREAT SERPL-MCNC: 1.8 MG/DL (ref 0.7–1.3)
DIFFERENTIAL METHOD BLD: ABNORMAL
EOSINOPHIL # BLD: 0.5 K/UL (ref 0–0.4)
EOSINOPHIL NFR BLD: 3 % (ref 0–7)
ERYTHROCYTE [DISTWIDTH] IN BLOOD BY AUTOMATED COUNT: 12.5 % (ref 11.5–14.5)
GLOBULIN SER CALC-MCNC: 3.3 G/DL (ref 2–4)
GLUCOSE SERPL-MCNC: 242 MG/DL (ref 65–100)
HCT VFR BLD AUTO: 37.5 % (ref 36.6–50.3)
HGB BLD-MCNC: 12.7 G/DL (ref 12.1–17)
IMM GRANULOCYTES # BLD AUTO: 0.1 K/UL (ref 0–0.04)
IMM GRANULOCYTES NFR BLD AUTO: 1 % (ref 0–0.5)
LYMPHOCYTES # BLD: 1.8 K/UL (ref 0.8–3.5)
LYMPHOCYTES NFR BLD: 11 % (ref 12–49)
MCH RBC QN AUTO: 31.9 PG (ref 26–34)
MCHC RBC AUTO-ENTMCNC: 33.9 G/DL (ref 30–36.5)
MCV RBC AUTO: 94.2 FL (ref 80–99)
MONOCYTES # BLD: 1.1 K/UL (ref 0–1)
MONOCYTES NFR BLD: 7 % (ref 5–13)
NEUTS SEG # BLD: 12.5 K/UL (ref 1.8–8)
NEUTS SEG NFR BLD: 77 % (ref 32–75)
NRBC # BLD: 0 K/UL (ref 0–0.01)
NRBC BLD-RTO: 0 PER 100 WBC
PLATELET # BLD AUTO: 204 K/UL (ref 150–400)
PMV BLD AUTO: 11.8 FL (ref 8.9–12.9)
POTASSIUM SERPL-SCNC: 4.3 MMOL/L (ref 3.5–5.1)
PROT SERPL-MCNC: 6.5 G/DL (ref 6.4–8.2)
RBC # BLD AUTO: 3.98 M/UL (ref 4.1–5.7)
SODIUM SERPL-SCNC: 138 MMOL/L (ref 136–145)
SPECIMEN HOLD: NORMAL
WBC # BLD AUTO: 16.1 K/UL (ref 4.1–11.1)

## 2024-05-25 PROCEDURE — 71045 X-RAY EXAM CHEST 1 VIEW: CPT

## 2024-05-25 PROCEDURE — 85025 COMPLETE CBC W/AUTO DIFF WBC: CPT

## 2024-05-25 PROCEDURE — 99285 EMERGENCY DEPT VISIT HI MDM: CPT

## 2024-05-25 PROCEDURE — 80053 COMPREHEN METABOLIC PANEL: CPT

## 2024-05-25 PROCEDURE — 36415 COLL VENOUS BLD VENIPUNCTURE: CPT

## 2024-05-25 PROCEDURE — 93005 ELECTROCARDIOGRAM TRACING: CPT | Performed by: EMERGENCY MEDICINE

## 2024-05-25 ASSESSMENT — PAIN - FUNCTIONAL ASSESSMENT: PAIN_FUNCTIONAL_ASSESSMENT: NONE - DENIES PAIN

## 2024-05-25 ASSESSMENT — LIFESTYLE VARIABLES
HOW MANY STANDARD DRINKS CONTAINING ALCOHOL DO YOU HAVE ON A TYPICAL DAY: 3 OR 4
HOW OFTEN DO YOU HAVE A DRINK CONTAINING ALCOHOL: 2-4 TIMES A MONTH

## 2024-05-26 LAB
APPEARANCE UR: CLEAR
BACTERIA URNS QL MICRO: NEGATIVE /HPF
BILIRUB UR QL: NEGATIVE
COLOR UR: ABNORMAL
EKG ATRIAL RATE: 60 BPM
EKG DIAGNOSIS: NORMAL
EKG P AXIS: 101 DEGREES
EKG P-R INTERVAL: 194 MS
EKG Q-T INTERVAL: 446 MS
EKG QRS DURATION: 92 MS
EKG QTC CALCULATION (BAZETT): 446 MS
EKG R AXIS: -19 DEGREES
EKG T AXIS: 137 DEGREES
EKG VENTRICULAR RATE: 60 BPM
EPITH CASTS URNS QL MICRO: ABNORMAL /LPF
GLUCOSE UR STRIP.AUTO-MCNC: NEGATIVE MG/DL
HGB UR QL STRIP: ABNORMAL
KETONES UR QL STRIP.AUTO: NEGATIVE MG/DL
LACTATE BLD-SCNC: 1.22 MMOL/L (ref 0.4–2)
LEUKOCYTE ESTERASE UR QL STRIP.AUTO: NEGATIVE
NITRITE UR QL STRIP.AUTO: NEGATIVE
PH UR STRIP: 5.5 (ref 5–8)
PROT UR STRIP-MCNC: ABNORMAL MG/DL
RBC #/AREA URNS HPF: ABNORMAL /HPF (ref 0–5)
SP GR UR REFRACTOMETRY: 1.02
URINE CULTURE IF INDICATED: ABNORMAL
UROBILINOGEN UR QL STRIP.AUTO: 1 EU/DL (ref 0.2–1)
WBC URNS QL MICRO: ABNORMAL /HPF (ref 0–4)

## 2024-05-26 PROCEDURE — 83605 ASSAY OF LACTIC ACID: CPT

## 2024-05-26 PROCEDURE — 81001 URINALYSIS AUTO W/SCOPE: CPT

## 2024-05-26 RX ORDER — CEPHALEXIN 500 MG/1
500 CAPSULE ORAL 4 TIMES DAILY
Qty: 28 CAPSULE | Refills: 0 | Status: SHIPPED | OUTPATIENT
Start: 2024-05-26 | End: 2024-06-02

## 2024-05-26 NOTE — DISCHARGE INSTRUCTIONS
I would recommend that you take Keflex (antibiotic) 4 times daily for one week.  I suspect that you have a urinary infection but we were unable to obtain a sample from you.  Return if you have worsening pain, fever, or weakness.

## 2024-05-26 NOTE — ED PROVIDER NOTES
fenofibrate (TRICOR) 48 MG tablet Take 1 tablet by mouth      metoprolol succinate (TOPROL XL) 25 MG extended release tablet Take 1 tablet by mouth daily 30 tablet 0    atorvastatin (LIPITOR) 40 MG tablet Take 1 tablet by mouth nightly 30 tablet 0    PARoxetine (PAXIL) 20 MG tablet Take 1 tablet by mouth every morning      acetaminophen (TYLENOL) 325 MG tablet Take 2 tablets by mouth every 4 hours as needed      albuterol sulfate  (90 Base) MCG/ACT inhaler Inhale 2 puffs into the lungs every 4 hours as needed      aspirin 81 MG EC tablet Take 1 tablet by mouth daily      omeprazole (PRILOSEC) 40 MG delayed release capsule Take 1 capsule by mouth daily as needed      umeclidinium-vilanterol (ANORO ELLIPTA) 62.5-25 MCG/INH AEPB inhaler Inhale 1 puff into the lungs daily         Past Medical History:   Diagnosis Date    Aneurysm (HCC)     abdominal    Arthritis     CAD (coronary artery disease)     heart attack    Heart failure (HCC)     CHF    Ill-defined condition 12/2017    nuclear stress test - led to catherization being done    Ill-defined condition     pre-COPD    Ill-defined condition     pre-diabetes    Pacemaker     Treatsie       Past Surgical History:   Procedure Laterality Date    CARDIAC CATHETERIZATION  12/2017    abdominal aneurysm    CARDIAC PROCEDURE N/A 2/14/2024    Left heart cath / coronary angiography performed by Garcia Bass MD at [facility] CARDIAC CATH LAB    COLONOSCOPY N/A 1/8/2018    COLONOSCOPY performed by Ari Alexis MD at [facility] ENDOSCOPY    EP DEVICE PROCEDURE N/A 2/19/2024    Insert ICD dual performed by Mark Aldrich MD at [facility] CARDIAC CATH LAB    HERNIA REPAIR      ORTHOPEDIC SURGERY      right foot Knox's neuroma    ORTHOPEDIC SURGERY      neck    ORTHOPEDIC SURGERY      left knee arthroscopic surgery    SD UNLISTED PROCEDURE CARDIAC SURGERY  4/5/13    CABG    SD UNLISTED PROCEDURE CARDIAC SURGERY  1992, 2006, 2008    stents    TONSILLECTOMY

## 2024-05-26 NOTE — ED NOTES
Pt tried to pee for us on 5 different occasions with assistance. When approached with order to straight cath, pt adamantly refused. Pt has no signs of urinary retention at this time. Spoke with MD about this and he said that he was going to discharge with abx.

## 2024-05-26 NOTE — ED NOTES
Patient discharged from the ED by RANDY Mchugh. Diagnosis, medications, precautions and follow-ups were reviewed with the patient/family. Questions were asked and answered prior to departure. Patient departed the ED via stretcher and was accompanied by [facility].

## 2024-06-12 ENCOUNTER — HOSPITAL ENCOUNTER (EMERGENCY)
Facility: HOSPITAL | Age: 79
Discharge: HOME | End: 2024-06-12
Attending: EMERGENCY MEDICINE
Payer: MEDICARE

## 2024-06-12 VITALS
TEMPERATURE: 97.8 F | HEIGHT: 70 IN | BODY MASS INDEX: 21.84 KG/M2 | HEART RATE: 61 BPM | OXYGEN SATURATION: 100 % | DIASTOLIC BLOOD PRESSURE: 77 MMHG | RESPIRATION RATE: 20 BRPM | WEIGHT: 152.56 LBS | SYSTOLIC BLOOD PRESSURE: 161 MMHG

## 2024-06-12 DIAGNOSIS — R55 SYNCOPE AND COLLAPSE: Primary | ICD-10-CM

## 2024-06-12 LAB
ALBUMIN SERPL-MCNC: 3 G/DL (ref 3.5–5)
ALBUMIN/GLOB SERPL: 1 (ref 1.1–2.2)
ALP SERPL-CCNC: 71 U/L (ref 45–117)
ALT SERPL-CCNC: 13 U/L (ref 12–78)
ANION GAP SERPL CALC-SCNC: 4 MMOL/L (ref 5–15)
AST SERPL-CCNC: 13 U/L (ref 15–37)
BASOPHILS # BLD: 0.1 K/UL (ref 0–0.1)
BASOPHILS NFR BLD: 1 % (ref 0–1)
BILIRUB SERPL-MCNC: 0.3 MG/DL (ref 0.2–1)
BUN SERPL-MCNC: 26 MG/DL (ref 6–20)
BUN/CREAT SERPL: 16 (ref 12–20)
CALCIUM SERPL-MCNC: 9.2 MG/DL (ref 8.5–10.1)
CHLORIDE SERPL-SCNC: 108 MMOL/L (ref 97–108)
CO2 SERPL-SCNC: 29 MMOL/L (ref 21–32)
CREAT SERPL-MCNC: 1.67 MG/DL (ref 0.7–1.3)
DIFFERENTIAL METHOD BLD: ABNORMAL
EOSINOPHIL # BLD: 0.4 K/UL (ref 0–0.4)
EOSINOPHIL NFR BLD: 4 % (ref 0–7)
ERYTHROCYTE [DISTWIDTH] IN BLOOD BY AUTOMATED COUNT: 12.7 % (ref 11.5–14.5)
GLOBULIN SER CALC-MCNC: 2.9 G/DL (ref 2–4)
GLUCOSE SERPL-MCNC: 121 MG/DL (ref 65–100)
HCT VFR BLD AUTO: 37.3 % (ref 36.6–50.3)
HGB BLD-MCNC: 12.3 G/DL (ref 12.1–17)
IMM GRANULOCYTES # BLD AUTO: 0.1 K/UL (ref 0–0.04)
IMM GRANULOCYTES NFR BLD AUTO: 1 % (ref 0–0.5)
LYMPHOCYTES # BLD: 1.6 K/UL (ref 0.8–3.5)
LYMPHOCYTES NFR BLD: 15 % (ref 12–49)
MCH RBC QN AUTO: 31.6 PG (ref 26–34)
MCHC RBC AUTO-ENTMCNC: 33 G/DL (ref 30–36.5)
MCV RBC AUTO: 95.9 FL (ref 80–99)
MONOCYTES # BLD: 0.8 K/UL (ref 0–1)
NEUTS SEG # BLD: 7.8 K/UL (ref 1.8–8)
NEUTS SEG NFR BLD: 71 % (ref 32–75)
NRBC # BLD: 0 K/UL (ref 0–0.01)
NRBC BLD-RTO: 0 PER 100 WBC
PLATELET # BLD AUTO: 196 K/UL (ref 150–400)
PMV BLD AUTO: 11.9 FL (ref 8.9–12.9)
POTASSIUM SERPL-SCNC: 4.6 MMOL/L (ref 3.5–5.1)
PROT SERPL-MCNC: 5.9 G/DL (ref 6.4–8.2)
RBC # BLD AUTO: 3.89 M/UL (ref 4.1–5.7)
SODIUM SERPL-SCNC: 141 MMOL/L (ref 136–145)
TROPONIN I SERPL HS-MCNC: 33 NG/L (ref 0–76)
TROPONIN I SERPL HS-MCNC: 34 NG/L (ref 0–76)
WBC # BLD AUTO: 10.7 K/UL (ref 4.1–11.1)

## 2024-06-12 PROCEDURE — 85025 COMPLETE CBC W/AUTO DIFF WBC: CPT

## 2024-06-12 PROCEDURE — 36415 COLL VENOUS BLD VENIPUNCTURE: CPT

## 2024-06-12 PROCEDURE — 93005 ELECTROCARDIOGRAM TRACING: CPT | Performed by: EMERGENCY MEDICINE

## 2024-06-12 PROCEDURE — 84484 ASSAY OF TROPONIN QUANT: CPT

## 2024-06-12 PROCEDURE — 99284 EMERGENCY DEPT VISIT MOD MDM: CPT

## 2024-06-12 PROCEDURE — 80053 COMPREHEN METABOLIC PANEL: CPT

## 2024-06-12 ASSESSMENT — PAIN - FUNCTIONAL ASSESSMENT: PAIN_FUNCTIONAL_ASSESSMENT: 0-10

## 2024-06-12 ASSESSMENT — LIFESTYLE VARIABLES
HOW MANY STANDARD DRINKS CONTAINING ALCOHOL DO YOU HAVE ON A TYPICAL DAY: PATIENT DOES NOT DRINK
HOW OFTEN DO YOU HAVE A DRINK CONTAINING ALCOHOL: NEVER

## 2024-06-12 ASSESSMENT — PAIN SCALES - GENERAL: PAINLEVEL_OUTOF10: 0

## 2024-06-12 NOTE — ED NOTES
0714: Bedside and Verbal shift change report given to Karen (oncoming nurse) by Keyon (offgoing nurse). Report included the following information Nurse Handoff Report.      1925: EDT Fransisco ambulated patient at this time successfully

## 2024-06-12 NOTE — ED PROVIDER NOTES
focal deficit present.      Mental Status: He is alert and oriented to person, place, and time.   Psychiatric:         Mood and Affect: Mood normal.         Behavior: Behavior normal.        DIAGNOSTIC RESULTS   LABS:     Recent Results (from the past 24 hour(s))   Troponin    Collection Time: 06/12/24  3:00 PM   Result Value Ref Range    Troponin, High Sensitivity 34 0 - 76 ng/L   CBC with Auto Differential    Collection Time: 06/12/24  3:00 PM   Result Value Ref Range    WBC 10.7 4.1 - 11.1 K/uL    RBC 3.89 (L) 4.10 - 5.70 M/uL    Hemoglobin 12.3 12.1 - 17.0 g/dL    Hematocrit 37.3 36.6 - 50.3 %    MCV 95.9 80.0 - 99.0 FL    MCH 31.6 26.0 - 34.0 PG    MCHC 33.0 30.0 - 36.5 g/dL    RDW 12.7 11.5 - 14.5 %    Platelets 196 150 - 400 K/uL    MPV 11.9 8.9 - 12.9 FL    Nucleated RBCs 0.0 0  WBC    nRBC 0.00 0.00 - 0.01 K/uL    Neutrophils % 71 32 - 75 %    Lymphocytes % 15 12 - 49 %    Monocytes % 8 5 - 13 %    Eosinophils % 4 0 - 7 %    Basophils % 1 0 - 1 %    Immature Granulocytes % 1 (H) 0.0 - 0.5 %    Neutrophils Absolute 7.8 1.8 - 8.0 K/UL    Lymphocytes Absolute 1.6 0.8 - 3.5 K/UL    Monocytes Absolute 0.8 0.0 - 1.0 K/UL    Eosinophils Absolute 0.4 0.0 - 0.4 K/UL    Basophils Absolute 0.1 0.0 - 0.1 K/UL    Immature Granulocytes Absolute 0.1 (H) 0.00 - 0.04 K/UL    Differential Type AUTOMATED     Comprehensive Metabolic Panel w/ Reflex to MG    Collection Time: 06/12/24  3:00 PM   Result Value Ref Range    Sodium 141 136 - 145 mmol/L    Potassium 4.6 3.5 - 5.1 mmol/L    Chloride 108 97 - 108 mmol/L    CO2 29 21 - 32 mmol/L    Anion Gap 4 (L) 5 - 15 mmol/L    Glucose 121 (H) 65 - 100 mg/dL    BUN 26 (H) 6 - 20 MG/DL    Creatinine 1.67 (H) 0.70 - 1.30 MG/DL    BUN/Creatinine Ratio 16 12 - 20      Est, Glom Filt Rate 42 (L) >60 ml/min/1.73m2    Calcium 9.2 8.5 - 10.1 MG/DL    Total Bilirubin 0.3 0.2 - 1.0 MG/DL    ALT 13 12 - 78 U/L    AST 13 (L) 15 - 37 U/L    Alk Phosphatase 71 45 - 117 U/L    Total Protein

## 2024-06-13 LAB
EKG ATRIAL RATE: 29 BPM
EKG DIAGNOSIS: NORMAL
EKG P AXIS: -27 DEGREES
EKG P-R INTERVAL: 188 MS
EKG Q-T INTERVAL: 440 MS
EKG QRS DURATION: 90 MS
EKG QTC CALCULATION (BAZETT): 440 MS
EKG R AXIS: -34 DEGREES
EKG T AXIS: 137 DEGREES
EKG VENTRICULAR RATE: 60 BPM

## 2024-10-08 ENCOUNTER — CARE COORDINATION (OUTPATIENT)
Dept: CARE COORDINATION | Age: 79
End: 2024-10-08

## 2024-10-08 NOTE — CARE COORDINATION
SECURE email notification sent to identified IDT members at   [facility] and [facility]    Adbry Counseling: I discussed with the patient the risks of tralokinumab including but not limited to eye infection and irritation, cold sores, injection site reactions, worsening of asthma, allergic reactions and increased risk of parasitic infection.  Live vaccines should be avoided while taking tralokinumab. The patient understands that monitoring is required and they must alert us or the primary physician if symptoms of infection or other concerning signs are noted.

## 2024-10-31 ENCOUNTER — HOSPITAL ENCOUNTER (INPATIENT)
Facility: HOSPITAL | Age: 79
LOS: 5 days | Discharge: NURSING, REHAB OR LONG-TERM CARE | DRG: 683 | End: 2024-11-05
Attending: EMERGENCY MEDICINE | Admitting: STUDENT IN AN ORGANIZED HEALTH CARE EDUCATION/TRAINING PROGRAM
Payer: MEDICARE

## 2024-10-31 ENCOUNTER — APPOINTMENT (OUTPATIENT)
Facility: HOSPITAL | Age: 79
DRG: 683 | End: 2024-10-31
Payer: MEDICARE
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
## 2024-10-31 DIAGNOSIS — R55 SYNCOPE AND COLLAPSE: Primary | ICD-10-CM

## 2024-10-31 DIAGNOSIS — R55 SYNCOPE, UNSPECIFIED SYNCOPE TYPE: ICD-10-CM

## 2024-10-31 DIAGNOSIS — D72.829 LEUKOCYTOSIS, UNSPECIFIED TYPE: ICD-10-CM

## 2024-10-31 LAB
ALBUMIN SERPL-MCNC: 4 G/DL (ref 3.5–5)
ALBUMIN/GLOB SERPL: 1.3 (ref 1.1–2.2)
ALP SERPL-CCNC: 75 U/L (ref 45–117)
ALT SERPL-CCNC: 16 U/L (ref 12–78)
AMORPH CRY URNS QL MICRO: ABNORMAL
ANION GAP SERPL CALC-SCNC: 7 MMOL/L (ref 2–12)
APPEARANCE UR: ABNORMAL
AST SERPL-CCNC: 13 U/L (ref 15–37)
BACTERIA URNS QL MICRO: ABNORMAL /HPF
BASOPHILS # BLD: 0.1 K/UL (ref 0–0.1)
BASOPHILS NFR BLD: 0 % (ref 0–1)
BILIRUB SERPL-MCNC: 0.7 MG/DL (ref 0.2–1)
BILIRUB UR QL: NEGATIVE
BUN SERPL-MCNC: 43 MG/DL (ref 6–20)
BUN/CREAT SERPL: 21 (ref 12–20)
CALCIUM SERPL-MCNC: 9.7 MG/DL (ref 8.5–10.1)
CHLORIDE SERPL-SCNC: 108 MMOL/L (ref 97–108)
CO2 SERPL-SCNC: 27 MMOL/L (ref 21–32)
COLOR UR: ABNORMAL
COMMENT:: NORMAL
CREAT SERPL-MCNC: 2.04 MG/DL (ref 0.7–1.3)
DIFFERENTIAL METHOD BLD: ABNORMAL
EOSINOPHIL # BLD: 0 K/UL (ref 0–0.4)
EOSINOPHIL NFR BLD: 0 % (ref 0–7)
EPITH CASTS URNS QL MICRO: ABNORMAL /LPF
ERYTHROCYTE [DISTWIDTH] IN BLOOD BY AUTOMATED COUNT: 12.8 % (ref 11.5–14.5)
GLOBULIN SER CALC-MCNC: 3.1 G/DL (ref 2–4)
GLUCOSE SERPL-MCNC: 140 MG/DL (ref 65–100)
GLUCOSE UR STRIP.AUTO-MCNC: NEGATIVE MG/DL
HCT VFR BLD AUTO: 41.2 % (ref 36.6–50.3)
HGB BLD-MCNC: 13.2 G/DL (ref 12.1–17)
HGB UR QL STRIP: ABNORMAL
IMM GRANULOCYTES # BLD AUTO: 0.2 K/UL (ref 0–0.04)
IMM GRANULOCYTES NFR BLD AUTO: 1 % (ref 0–0.5)
KETONES UR QL STRIP.AUTO: NEGATIVE MG/DL
LEUKOCYTE ESTERASE UR QL STRIP.AUTO: ABNORMAL
LYMPHOCYTES # BLD: 1.4 K/UL (ref 0.8–3.5)
LYMPHOCYTES NFR BLD: 8 % (ref 12–49)
MCH RBC QN AUTO: 30.6 PG (ref 26–34)
MCHC RBC AUTO-ENTMCNC: 32 G/DL (ref 30–36.5)
MCV RBC AUTO: 95.6 FL (ref 80–99)
MONOCYTES # BLD: 1.1 K/UL (ref 0–1)
MONOCYTES NFR BLD: 6 % (ref 5–13)
MUCOUS THREADS URNS QL MICRO: ABNORMAL /LPF
NEUTS SEG # BLD: 15.3 K/UL (ref 1.8–8)
NEUTS SEG NFR BLD: 85 % (ref 32–75)
NITRITE UR QL STRIP.AUTO: NEGATIVE
NRBC # BLD: 0 K/UL (ref 0–0.01)
NRBC BLD-RTO: 0 PER 100 WBC
PH UR STRIP: 5.5 (ref 5–8)
PLATELET # BLD AUTO: 192 K/UL (ref 150–400)
PMV BLD AUTO: 12.1 FL (ref 8.9–12.9)
POTASSIUM SERPL-SCNC: 4.3 MMOL/L (ref 3.5–5.1)
PROT SERPL-MCNC: 7.1 G/DL (ref 6.4–8.2)
PROT UR STRIP-MCNC: 30 MG/DL
RBC # BLD AUTO: 4.31 M/UL (ref 4.1–5.7)
RBC #/AREA URNS HPF: ABNORMAL /HPF (ref 0–5)
SODIUM SERPL-SCNC: 142 MMOL/L (ref 136–145)
SP GR UR REFRACTOMETRY: 1.02
SPECIMEN HOLD: NORMAL
TROPONIN I SERPL HS-MCNC: 45 NG/L (ref 0–76)
TROPONIN I SERPL HS-MCNC: 45 NG/L (ref 0–76)
URINE CULTURE IF INDICATED: ABNORMAL
UROBILINOGEN UR QL STRIP.AUTO: 1 EU/DL (ref 0.2–1)
WBC # BLD AUTO: 18.1 K/UL (ref 4.1–11.1)
WBC URNS QL MICRO: ABNORMAL /HPF (ref 0–4)

## 2024-10-31 PROCEDURE — 6360000002 HC RX W HCPCS: Performed by: STUDENT IN AN ORGANIZED HEALTH CARE EDUCATION/TRAINING PROGRAM

## 2024-10-31 PROCEDURE — 70450 CT HEAD/BRAIN W/O DYE: CPT

## 2024-10-31 PROCEDURE — 1100000003 HC PRIVATE W/ TELEMETRY

## 2024-10-31 PROCEDURE — 71045 X-RAY EXAM CHEST 1 VIEW: CPT

## 2024-10-31 PROCEDURE — 80053 COMPREHEN METABOLIC PANEL: CPT

## 2024-10-31 PROCEDURE — 36415 COLL VENOUS BLD VENIPUNCTURE: CPT

## 2024-10-31 PROCEDURE — 2580000003 HC RX 258: Performed by: STUDENT IN AN ORGANIZED HEALTH CARE EDUCATION/TRAINING PROGRAM

## 2024-10-31 PROCEDURE — 81001 URINALYSIS AUTO W/SCOPE: CPT

## 2024-10-31 PROCEDURE — 99285 EMERGENCY DEPT VISIT HI MDM: CPT

## 2024-10-31 PROCEDURE — 93005 ELECTROCARDIOGRAM TRACING: CPT | Performed by: EMERGENCY MEDICINE

## 2024-10-31 PROCEDURE — 85025 COMPLETE CBC W/AUTO DIFF WBC: CPT

## 2024-10-31 PROCEDURE — 2580000003 HC RX 258: Performed by: EMERGENCY MEDICINE

## 2024-10-31 PROCEDURE — 84484 ASSAY OF TROPONIN QUANT: CPT

## 2024-10-31 RX ORDER — ACETAMINOPHEN 325 MG/1
650 TABLET ORAL EVERY 6 HOURS PRN
Status: DISCONTINUED | OUTPATIENT
Start: 2024-10-31 | End: 2024-11-05 | Stop reason: HOSPADM

## 2024-10-31 RX ORDER — MAGNESIUM SULFATE IN WATER 40 MG/ML
2000 INJECTION, SOLUTION INTRAVENOUS PRN
Status: DISCONTINUED | OUTPATIENT
Start: 2024-10-31 | End: 2024-11-01

## 2024-10-31 RX ORDER — SODIUM CHLORIDE, SODIUM LACTATE, POTASSIUM CHLORIDE, CALCIUM CHLORIDE 600; 310; 30; 20 MG/100ML; MG/100ML; MG/100ML; MG/100ML
INJECTION, SOLUTION INTRAVENOUS CONTINUOUS
Status: DISCONTINUED | OUTPATIENT
Start: 2024-10-31 | End: 2024-11-01

## 2024-10-31 RX ORDER — PAROXETINE 20 MG/1
40 TABLET, FILM COATED ORAL EVERY MORNING
Status: DISCONTINUED | OUTPATIENT
Start: 2024-11-01 | End: 2024-11-05 | Stop reason: HOSPADM

## 2024-10-31 RX ORDER — ASPIRIN 81 MG/1
81 TABLET ORAL DAILY
Status: DISCONTINUED | OUTPATIENT
Start: 2024-11-01 | End: 2024-11-05 | Stop reason: HOSPADM

## 2024-10-31 RX ORDER — HYDRALAZINE HYDROCHLORIDE 20 MG/ML
10 INJECTION INTRAMUSCULAR; INTRAVENOUS EVERY 6 HOURS PRN
Status: DISCONTINUED | OUTPATIENT
Start: 2024-10-31 | End: 2024-11-05 | Stop reason: HOSPADM

## 2024-10-31 RX ORDER — ONDANSETRON 4 MG/1
4 TABLET, ORALLY DISINTEGRATING ORAL EVERY 8 HOURS PRN
Status: DISCONTINUED | OUTPATIENT
Start: 2024-10-31 | End: 2024-11-05 | Stop reason: HOSPADM

## 2024-10-31 RX ORDER — ONDANSETRON 2 MG/ML
4 INJECTION INTRAMUSCULAR; INTRAVENOUS EVERY 6 HOURS PRN
Status: DISCONTINUED | OUTPATIENT
Start: 2024-10-31 | End: 2024-11-05 | Stop reason: HOSPADM

## 2024-10-31 RX ORDER — SODIUM CHLORIDE 0.9 % (FLUSH) 0.9 %
5-40 SYRINGE (ML) INJECTION EVERY 12 HOURS SCHEDULED
Status: DISCONTINUED | OUTPATIENT
Start: 2024-10-31 | End: 2024-11-05 | Stop reason: HOSPADM

## 2024-10-31 RX ORDER — ACETAMINOPHEN 650 MG/1
650 SUPPOSITORY RECTAL EVERY 6 HOURS PRN
Status: DISCONTINUED | OUTPATIENT
Start: 2024-10-31 | End: 2024-11-05 | Stop reason: HOSPADM

## 2024-10-31 RX ORDER — ENOXAPARIN SODIUM 100 MG/ML
30 INJECTION SUBCUTANEOUS EVERY 24 HOURS
Status: DISCONTINUED | OUTPATIENT
Start: 2024-11-01 | End: 2024-11-02

## 2024-10-31 RX ORDER — POLYETHYLENE GLYCOL 3350 17 G/17G
17 POWDER, FOR SOLUTION ORAL DAILY PRN
Status: DISCONTINUED | OUTPATIENT
Start: 2024-10-31 | End: 2024-11-05 | Stop reason: HOSPADM

## 2024-10-31 RX ORDER — METOPROLOL SUCCINATE 25 MG/1
25 TABLET, EXTENDED RELEASE ORAL DAILY
Status: DISCONTINUED | OUTPATIENT
Start: 2024-11-01 | End: 2024-11-05 | Stop reason: HOSPADM

## 2024-10-31 RX ORDER — ALBUTEROL SULFATE 0.83 MG/ML
2.5 SOLUTION RESPIRATORY (INHALATION) EVERY 4 HOURS PRN
Status: DISCONTINUED | OUTPATIENT
Start: 2024-10-31 | End: 2024-11-05 | Stop reason: HOSPADM

## 2024-10-31 RX ORDER — ALBUTEROL SULFATE 90 UG/1
2 INHALANT RESPIRATORY (INHALATION) EVERY 4 HOURS PRN
Status: DISCONTINUED | OUTPATIENT
Start: 2024-10-31 | End: 2024-10-31 | Stop reason: ALTCHOICE

## 2024-10-31 RX ORDER — SODIUM CHLORIDE, SODIUM LACTATE, POTASSIUM CHLORIDE, AND CALCIUM CHLORIDE .6; .31; .03; .02 G/100ML; G/100ML; G/100ML; G/100ML
1000 INJECTION, SOLUTION INTRAVENOUS
Status: COMPLETED | OUTPATIENT
Start: 2024-10-31 | End: 2024-10-31

## 2024-10-31 RX ORDER — SODIUM CHLORIDE 9 MG/ML
INJECTION, SOLUTION INTRAVENOUS PRN
Status: DISCONTINUED | OUTPATIENT
Start: 2024-10-31 | End: 2024-11-05 | Stop reason: HOSPADM

## 2024-10-31 RX ORDER — SODIUM CHLORIDE 0.9 % (FLUSH) 0.9 %
5-40 SYRINGE (ML) INJECTION PRN
Status: DISCONTINUED | OUTPATIENT
Start: 2024-10-31 | End: 2024-11-05 | Stop reason: HOSPADM

## 2024-10-31 RX ADMIN — SODIUM CHLORIDE, POTASSIUM CHLORIDE, SODIUM LACTATE AND CALCIUM CHLORIDE 1000 ML: 600; 310; 30; 20 INJECTION, SOLUTION INTRAVENOUS at 16:05

## 2024-10-31 RX ADMIN — SODIUM CHLORIDE, PRESERVATIVE FREE 10 ML: 5 INJECTION INTRAVENOUS at 22:25

## 2024-10-31 RX ADMIN — WATER 1000 MG: 1 INJECTION INTRAMUSCULAR; INTRAVENOUS; SUBCUTANEOUS at 22:23

## 2024-10-31 ASSESSMENT — PAIN - FUNCTIONAL ASSESSMENT
PAIN_FUNCTIONAL_ASSESSMENT: ACTIVITIES ARE NOT PREVENTED
PAIN_FUNCTIONAL_ASSESSMENT: 0-10

## 2024-10-31 ASSESSMENT — PAIN DESCRIPTION - PAIN TYPE: TYPE: ACUTE PAIN

## 2024-10-31 ASSESSMENT — PAIN DESCRIPTION - DESCRIPTORS: DESCRIPTORS: DISCOMFORT

## 2024-10-31 ASSESSMENT — PAIN DESCRIPTION - LOCATION: LOCATION: CHEST

## 2024-10-31 ASSESSMENT — PAIN DESCRIPTION - ORIENTATION: ORIENTATION: MID

## 2024-10-31 ASSESSMENT — PAIN SCALES - GENERAL: PAINLEVEL_OUTOF10: 10

## 2024-10-31 ASSESSMENT — PAIN DESCRIPTION - FREQUENCY: FREQUENCY: CONTINUOUS

## 2024-10-31 ASSESSMENT — PAIN DESCRIPTION - ONSET: ONSET: ON-GOING

## 2024-10-31 NOTE — H&P
processed based on acceptable specimen integrity and analyte stability, which may vary by analyte.   Troponin    Collection Time: 10/31/24  4:35 PM   Result Value Ref Range    Troponin, High Sensitivity 45 0 - 76 ng/L         Imaging Results (most recent):  XR CHEST PORTABLE    Result Date: 10/31/2024  EXAM:  XR CHEST PORTABLE INDICATION: r/o infiltrate, R sided chest pain COMPARISON: Chest radiograph 5/25/2024, CTA chest 4/16/2024 TECHNIQUE: Upright portable chest AP view FINDINGS: Median sternotomy and coronary revascularization. Left pectoral ICD. The cardiac silhouette is within normal limits. The pulmonary vasculature is within normal limits. The lungs and pleural spaces are clear. Osseous structures age-appropriate. Upper abdominal metallic stents and embolization coils..     No acute process on portable chest. Electronically signed by STEPHON RODRIGUEZ    CT HEAD WO CONTRAST    Result Date: 10/31/2024  EXAM: Head CT without Contrast: TECHNIQUE: Unenhanced CT Head is performed. CT dose reduction was achieved through use of a standardized protocol tailored for this examination and automatic exposure control for dose modulation. INDICATION:  AMS FINDINGS: There is no apparent acute infarct or mass, and no bleed, shift, hydrocephalus or significant extra-axial fluid collection. There are basal ganglia and deep white matter lacunes. Cerebral white matter hypodensity is present, nonspecific, but favoring chronic microangiopathy. Bone windows are unremarkable.     No acute intracranial abnormality. Electronically signed by FREDDY SANTOS        _______________________________________________________________________    TOTAL TIME:  70 Minutes    Critical Care Provided     Minutes non procedure based    Signed: Tono Sanders PA-C    Procedures: see electronic medical records for all procedures/Xrays and details which were not copied into this note but were reviewed prior to creation of Plan.

## 2024-10-31 NOTE — ED NOTES
Pt attached to continuous monitoring. Bed alarm set, bed locked & in lowest position, x2 side rails up, call bell within reach, & urinal within reach.

## 2024-10-31 NOTE — ED NOTES
Patient refused straight stick for blood cultures, stating \"You can do it tomorrow when you are fresh, I've been through too much shit today.\" Primary RN Huber notified.

## 2024-10-31 NOTE — ED PROVIDER NOTES
9:32 PM    Chief Complaint   Patient presents with    Chest Pain     Pt BIBEMS from home with reports of chest pain that started this morning. Pt denies SOB. Pt just was d/c from rehab for UTI.    Fall     Pt had a GLF last night. Did not hit head or LOC.          The patient presents with: Syncope.  79-year-old male with a history of pacemaker, CAD status post CABG and carotid artery stenosis presents emerged department from a group home by EMS with a chief complaint of syncopal episode.  Patient is a very poor historian.    Per EMS, patient apparently had a syncopal episode.  Endorse chest pain prior.  Patient is unsure if he struck his head.  He has not any blood thinners.  Patient denies any complaints on my assessment.  Patient reports chest pain was on right side.    My exam shows: 79-year-old male, resting in bed, appears deconditioned.  Normocephalic and atraumatic.  Regular rate and rhythm, no murmurs.  Clear to auscultation bilaterally.  Patient has no abdominal tenderness.  He is a poor historian and tangential at times, but no focal deficits.    Impression/Plan: 79-year-old male with history of CAD status post CABG presents to the emergency department with a chief complaint of chest pain and syncope.  Given patient does have a pacemaker in place, will interrogate this.  Twelve-lead EKG is unremarkable.    Given patient's fall and confusion, CT head obtained, nonfocal neurologic exam, doubt CVA.    Labs obtained, this does show a leukocytosis as well as mild KAVON.  Will hydrate with IV fluids.  Will check chest x-ray to rule out infiltrate.  Check urinalysis, history of UTIs.    Anticipate patient will require admission.    Nursing note and vitals reviewed.    MEDICATIONS GIVEN:  Medications   cefTRIAXone (ROCEPHIN) 1,000 mg in sterile water 10 mL IV syringe (has no administration in time range)   lactated ringers infusion (has no administration in time range)   sodium chloride flush 0.9 % injection 5-40 
none  Procedures       CRITICAL CARE TIME   Patient does not meet Critical Care Time, 0 minutes     SCREENINGS   NIH Stroke Score       Heart Score       Curb-65          EMERGENCY DEPARTMENT COURSE and DIFFERENTIAL DIAGNOSIS/MDM   Vitals:    Vitals:    10/31/24 1656 10/31/24 1801 10/31/24 1816 10/31/24 1831   BP: (!) 166/70 (!) 158/68 (!) 156/73 (!) 154/70   Pulse: 60 60 63 64   Resp: 18 14 16 16   Temp:       TempSrc:       SpO2:       Height:                Patient was given the following medications:  Medications   lactated ringers bolus 1,000 mL (has no administration in time range)   cefTRIAXone (ROCEPHIN) 1,000 mg in sterile water 10 mL IV syringe (has no administration in time range)   lactated ringers infusion (has no administration in time range)   sodium chloride flush 0.9 % injection 5-40 mL (has no administration in time range)   sodium chloride flush 0.9 % injection 5-40 mL (has no administration in time range)   0.9 % sodium chloride infusion (has no administration in time range)   magnesium sulfate 2000 mg in 50 mL IVPB premix (has no administration in time range)   enoxaparin (LOVENOX) injection 40 mg (has no administration in time range)   ondansetron (ZOFRAN-ODT) disintegrating tablet 4 mg (has no administration in time range)     Or   ondansetron (ZOFRAN) injection 4 mg (has no administration in time range)   polyethylene glycol (GLYCOLAX) packet 17 g (has no administration in time range)   acetaminophen (TYLENOL) tablet 650 mg (has no administration in time range)     Or   acetaminophen (TYLENOL) suppository 650 mg (has no administration in time range)   Please enter Wt in chart (has no administration in time range)   aspirin EC tablet 81 mg (has no administration in time range)   metoprolol succinate (TOPROL XL) extended release tablet 25 mg (has no administration in time range)   PARoxetine (PAXIL) tablet 40 mg (has no administration in time range)   hydrALAZINE (APRESOLINE) injection 10 mg

## 2024-10-31 NOTE — ED NOTES
This RN gave SBAR report to Emerson RN at this time. Offered oncoming RN the opportunity to ask any questions. This RN stated pertinent pt information, additionally informed oncoming RN of tasks that still need to be completed including but not limited to additional blood work and medication administration. Pt's bed locked & in lowest position and call light w/in reach. Emergency equipment at bedside.

## 2024-11-01 LAB
ANION GAP SERPL CALC-SCNC: 5 MMOL/L (ref 2–12)
BASOPHILS # BLD: 0.1 K/UL (ref 0–0.1)
BASOPHILS NFR BLD: 1 % (ref 0–1)
BUN SERPL-MCNC: 38 MG/DL (ref 6–20)
BUN/CREAT SERPL: 26 (ref 12–20)
CALCIUM SERPL-MCNC: 8.9 MG/DL (ref 8.5–10.1)
CHLORIDE SERPL-SCNC: 108 MMOL/L (ref 97–108)
CO2 SERPL-SCNC: 28 MMOL/L (ref 21–32)
CREAT SERPL-MCNC: 1.48 MG/DL (ref 0.7–1.3)
DIFFERENTIAL METHOD BLD: ABNORMAL
EKG ATRIAL RATE: 60 BPM
EKG ATRIAL RATE: 60 BPM
EKG DIAGNOSIS: NORMAL
EKG DIAGNOSIS: NORMAL
EKG P AXIS: 103 DEGREES
EKG P AXIS: 97 DEGREES
EKG P-R INTERVAL: 124 MS
EKG P-R INTERVAL: 158 MS
EKG Q-T INTERVAL: 418 MS
EKG Q-T INTERVAL: 428 MS
EKG QRS DURATION: 86 MS
EKG QRS DURATION: 94 MS
EKG QTC CALCULATION (BAZETT): 418 MS
EKG QTC CALCULATION (BAZETT): 428 MS
EKG R AXIS: -22 DEGREES
EKG R AXIS: -40 DEGREES
EKG T AXIS: 150 DEGREES
EKG T AXIS: 97 DEGREES
EKG VENTRICULAR RATE: 60 BPM
EKG VENTRICULAR RATE: 60 BPM
EOSINOPHIL # BLD: 0.2 K/UL (ref 0–0.4)
EOSINOPHIL NFR BLD: 2 % (ref 0–7)
ERYTHROCYTE [DISTWIDTH] IN BLOOD BY AUTOMATED COUNT: 12.7 % (ref 11.5–14.5)
GLUCOSE SERPL-MCNC: 106 MG/DL (ref 65–100)
HCT VFR BLD AUTO: 35.7 % (ref 36.6–50.3)
HGB BLD-MCNC: 11.7 G/DL (ref 12.1–17)
IMM GRANULOCYTES # BLD AUTO: 0.1 K/UL (ref 0–0.04)
IMM GRANULOCYTES NFR BLD AUTO: 1 % (ref 0–0.5)
LYMPHOCYTES # BLD: 2 K/UL (ref 0.8–3.5)
LYMPHOCYTES NFR BLD: 16 % (ref 12–49)
MCH RBC QN AUTO: 31.2 PG (ref 26–34)
MCHC RBC AUTO-ENTMCNC: 32.8 G/DL (ref 30–36.5)
MCV RBC AUTO: 95.2 FL (ref 80–99)
MONOCYTES # BLD: 1 K/UL (ref 0–1)
MONOCYTES NFR BLD: 8 % (ref 5–13)
NEUTS SEG # BLD: 9 K/UL (ref 1.8–8)
NEUTS SEG NFR BLD: 72 % (ref 32–75)
NRBC # BLD: 0 K/UL (ref 0–0.01)
NRBC BLD-RTO: 0 PER 100 WBC
PLATELET # BLD AUTO: 166 K/UL (ref 150–400)
PMV BLD AUTO: 12.3 FL (ref 8.9–12.9)
POTASSIUM SERPL-SCNC: 3.7 MMOL/L (ref 3.5–5.1)
RBC # BLD AUTO: 3.75 M/UL (ref 4.1–5.7)
SODIUM SERPL-SCNC: 141 MMOL/L (ref 136–145)
TROPONIN I SERPL HS-MCNC: 55 NG/L (ref 0–76)
WBC # BLD AUTO: 12.3 K/UL (ref 4.1–11.1)

## 2024-11-01 PROCEDURE — 1100000003 HC PRIVATE W/ TELEMETRY

## 2024-11-01 PROCEDURE — 6360000002 HC RX W HCPCS: Performed by: STUDENT IN AN ORGANIZED HEALTH CARE EDUCATION/TRAINING PROGRAM

## 2024-11-01 PROCEDURE — 2580000003 HC RX 258: Performed by: STUDENT IN AN ORGANIZED HEALTH CARE EDUCATION/TRAINING PROGRAM

## 2024-11-01 PROCEDURE — 6370000000 HC RX 637 (ALT 250 FOR IP): Performed by: STUDENT IN AN ORGANIZED HEALTH CARE EDUCATION/TRAINING PROGRAM

## 2024-11-01 PROCEDURE — 80048 BASIC METABOLIC PNL TOTAL CA: CPT

## 2024-11-01 PROCEDURE — 85025 COMPLETE CBC W/AUTO DIFF WBC: CPT

## 2024-11-01 PROCEDURE — 87086 URINE CULTURE/COLONY COUNT: CPT

## 2024-11-01 PROCEDURE — 84484 ASSAY OF TROPONIN QUANT: CPT

## 2024-11-01 RX ADMIN — ENOXAPARIN SODIUM 30 MG: 100 INJECTION SUBCUTANEOUS at 12:43

## 2024-11-01 RX ADMIN — WATER 1000 MG: 1 INJECTION INTRAMUSCULAR; INTRAVENOUS; SUBCUTANEOUS at 17:42

## 2024-11-01 RX ADMIN — ASPIRIN 81 MG: 81 TABLET, COATED ORAL at 09:26

## 2024-11-01 RX ADMIN — SODIUM CHLORIDE, PRESERVATIVE FREE 10 ML: 5 INJECTION INTRAVENOUS at 20:18

## 2024-11-01 RX ADMIN — METOPROLOL SUCCINATE 25 MG: 25 TABLET, EXTENDED RELEASE ORAL at 09:26

## 2024-11-01 RX ADMIN — SODIUM CHLORIDE, PRESERVATIVE FREE 10 ML: 5 INJECTION INTRAVENOUS at 09:27

## 2024-11-01 RX ADMIN — PAROXETINE HYDROCHLORIDE 40 MG: 20 TABLET, FILM COATED ORAL at 09:26

## 2024-11-01 NOTE — CONSULTS
[facility]              [address] [place] [address]                              CONSULTATION      PATIENT NAME: REBECCA OLEA              : 1945  MED REC NO: [id number]                       ROOM: 2119  ACCOUNT NO: [id number]                       ADMIT DATE: 10/31/2024  PROVIDER: Chapito Williamson MD    DATE OF SERVICE:  2024    ATTENDING PHYSICIAN:  ERIN CORTES    REASON FOR CONSULTATION:  Evaluate syncope.    CHIEF COMPLAINT:  The patient voices no chief complaint.    HISTORY OF PRESENT ILLNESS:  The patient is a 79-year-old man followed by Dr. Iban Fay.  He has a history of an ischemic cardiomyopathy with prior coronary artery bypass grafting in .  He has a known EF of 25%.  He does have an implantable defibrillator placed by  *** in February.  He underwent catheterization by  *** back in February that showed 3-vessel native coronary disease with all bypass grafts patent.  He had a syncopal episode at that time prompting the defibrillator placement.    The patient is a poor historian.  He also has a history of hypertension, dyslipidemia, and type 2 diabetes.  He apparently has some dementia and in the past has lived with several friends.  He was admitted after he presented to the emergency room following a syncopal episode.  Reportedly, there was some chest pain as well.  He did have some reproducible right side chest wall pain on admission.  Cardiac enzymes were negative and we were asked to see the patient for evaluation.  He has a [employer] defibrillator.  No other complaints at this time.    PAST MEDICAL HISTORY:  As noted above.  Also, dementia, type 2 diabetes, gastroesophageal reflux, history of prior abdominal aortic aneurysm with prior EVAR procedure.    OTHER SURGERIES:  Include prior ICD placement, bypass surgery, surgery for Knox's neuroma, tonsillectomy, neck surgery, left knee surgery.    CURRENT MEDICATIONS:

## 2024-11-01 NOTE — CONSULTS
Pt seen and examined. Full consult dictated     Doubt arrhythmia as cause of syncope    Will arrange to have device interrogated

## 2024-11-02 LAB
ANION GAP SERPL CALC-SCNC: 4 MMOL/L (ref 2–12)
BACTERIA SPEC CULT: NORMAL
BASOPHILS # BLD: 0.1 K/UL (ref 0–0.1)
BASOPHILS NFR BLD: 1 % (ref 0–1)
BUN SERPL-MCNC: 29 MG/DL (ref 6–20)
BUN/CREAT SERPL: 21 (ref 12–20)
CALCIUM SERPL-MCNC: 9 MG/DL (ref 8.5–10.1)
CHLORIDE SERPL-SCNC: 107 MMOL/L (ref 97–108)
CO2 SERPL-SCNC: 29 MMOL/L (ref 21–32)
CREAT SERPL-MCNC: 1.35 MG/DL (ref 0.7–1.3)
DIFFERENTIAL METHOD BLD: ABNORMAL
EOSINOPHIL # BLD: 0.3 K/UL (ref 0–0.4)
EOSINOPHIL NFR BLD: 3 % (ref 0–7)
ERYTHROCYTE [DISTWIDTH] IN BLOOD BY AUTOMATED COUNT: 12.6 % (ref 11.5–14.5)
GLUCOSE SERPL-MCNC: 109 MG/DL (ref 65–100)
HCT VFR BLD AUTO: 35.8 % (ref 36.6–50.3)
HGB BLD-MCNC: 11.9 G/DL (ref 12.1–17)
IMM GRANULOCYTES # BLD AUTO: 0 K/UL (ref 0–0.04)
IMM GRANULOCYTES NFR BLD AUTO: 0 % (ref 0–0.5)
LYMPHOCYTES # BLD: 2.1 K/UL (ref 0.8–3.5)
LYMPHOCYTES NFR BLD: 22 % (ref 12–49)
MCH RBC QN AUTO: 30.9 PG (ref 26–34)
MCHC RBC AUTO-ENTMCNC: 33.2 G/DL (ref 30–36.5)
MCV RBC AUTO: 93 FL (ref 80–99)
MONOCYTES # BLD: 0.9 K/UL (ref 0–1)
MONOCYTES NFR BLD: 9 % (ref 5–13)
NEUTS SEG # BLD: 6.4 K/UL (ref 1.8–8)
NEUTS SEG NFR BLD: 65 % (ref 32–75)
NRBC # BLD: 0 K/UL (ref 0–0.01)
NRBC BLD-RTO: 0 PER 100 WBC
PLATELET # BLD AUTO: 157 K/UL (ref 150–400)
PMV BLD AUTO: 11.9 FL (ref 8.9–12.9)
POTASSIUM SERPL-SCNC: 3.6 MMOL/L (ref 3.5–5.1)
RBC # BLD AUTO: 3.85 M/UL (ref 4.1–5.7)
SERVICE CMNT-IMP: NORMAL
SODIUM SERPL-SCNC: 140 MMOL/L (ref 136–145)
WBC # BLD AUTO: 9.8 K/UL (ref 4.1–11.1)

## 2024-11-02 PROCEDURE — 2580000003 HC RX 258: Performed by: STUDENT IN AN ORGANIZED HEALTH CARE EDUCATION/TRAINING PROGRAM

## 2024-11-02 PROCEDURE — 6360000002 HC RX W HCPCS: Performed by: STUDENT IN AN ORGANIZED HEALTH CARE EDUCATION/TRAINING PROGRAM

## 2024-11-02 PROCEDURE — 80048 BASIC METABOLIC PNL TOTAL CA: CPT

## 2024-11-02 PROCEDURE — 1100000003 HC PRIVATE W/ TELEMETRY

## 2024-11-02 PROCEDURE — 97530 THERAPEUTIC ACTIVITIES: CPT

## 2024-11-02 PROCEDURE — 85025 COMPLETE CBC W/AUTO DIFF WBC: CPT

## 2024-11-02 PROCEDURE — 97161 PT EVAL LOW COMPLEX 20 MIN: CPT

## 2024-11-02 PROCEDURE — 36415 COLL VENOUS BLD VENIPUNCTURE: CPT

## 2024-11-02 PROCEDURE — 6370000000 HC RX 637 (ALT 250 FOR IP): Performed by: STUDENT IN AN ORGANIZED HEALTH CARE EDUCATION/TRAINING PROGRAM

## 2024-11-02 PROCEDURE — 2580000003 HC RX 258: Performed by: INTERNAL MEDICINE

## 2024-11-02 RX ORDER — SODIUM CHLORIDE 9 MG/ML
INJECTION, SOLUTION INTRAVENOUS CONTINUOUS
Status: ACTIVE | OUTPATIENT
Start: 2024-11-02 | End: 2024-11-03

## 2024-11-02 RX ORDER — ENOXAPARIN SODIUM 100 MG/ML
40 INJECTION SUBCUTANEOUS EVERY 24 HOURS
Status: DISCONTINUED | OUTPATIENT
Start: 2024-11-03 | End: 2024-11-05 | Stop reason: HOSPADM

## 2024-11-02 RX ADMIN — WATER 1000 MG: 1 INJECTION INTRAMUSCULAR; INTRAVENOUS; SUBCUTANEOUS at 17:56

## 2024-11-02 RX ADMIN — SODIUM CHLORIDE, PRESERVATIVE FREE 10 ML: 5 INJECTION INTRAVENOUS at 08:32

## 2024-11-02 RX ADMIN — SODIUM CHLORIDE: 9 INJECTION, SOLUTION INTRAVENOUS at 12:38

## 2024-11-02 RX ADMIN — ENOXAPARIN SODIUM 30 MG: 100 INJECTION SUBCUTANEOUS at 12:34

## 2024-11-02 RX ADMIN — PAROXETINE HYDROCHLORIDE 40 MG: 20 TABLET, FILM COATED ORAL at 08:31

## 2024-11-02 RX ADMIN — ASPIRIN 81 MG: 81 TABLET, COATED ORAL at 08:31

## 2024-11-02 RX ADMIN — METOPROLOL SUCCINATE 25 MG: 25 TABLET, EXTENDED RELEASE ORAL at 08:31

## 2024-11-02 NOTE — CARE COORDINATION
Chart reviewed. CM made attempt x 2 to reach pt by phone to complete assessment without response. Will defer and re-attempt at a later time.       11/02/24 0870   Service Assessment   Patient Orientation Unable to Assess       CHATO Wilson   Care Manager, [facility]  [phone]

## 2024-11-03 LAB
ANION GAP SERPL CALC-SCNC: 4 MMOL/L (ref 2–12)
BASOPHILS # BLD: 0.1 K/UL (ref 0–0.1)
BASOPHILS NFR BLD: 1 % (ref 0–1)
BUN SERPL-MCNC: 27 MG/DL (ref 6–20)
BUN/CREAT SERPL: 20 (ref 12–20)
CALCIUM SERPL-MCNC: 8.9 MG/DL (ref 8.5–10.1)
CHLORIDE SERPL-SCNC: 106 MMOL/L (ref 97–108)
CO2 SERPL-SCNC: 29 MMOL/L (ref 21–32)
CREAT SERPL-MCNC: 1.32 MG/DL (ref 0.7–1.3)
DIFFERENTIAL METHOD BLD: ABNORMAL
EOSINOPHIL # BLD: 0.4 K/UL (ref 0–0.4)
EOSINOPHIL NFR BLD: 4 % (ref 0–7)
ERYTHROCYTE [DISTWIDTH] IN BLOOD BY AUTOMATED COUNT: 12.6 % (ref 11.5–14.5)
GLUCOSE SERPL-MCNC: 103 MG/DL (ref 65–100)
HCT VFR BLD AUTO: 36.1 % (ref 36.6–50.3)
HGB BLD-MCNC: 12.3 G/DL (ref 12.1–17)
IMM GRANULOCYTES # BLD AUTO: 0.1 K/UL (ref 0–0.04)
IMM GRANULOCYTES NFR BLD AUTO: 1 % (ref 0–0.5)
LYMPHOCYTES # BLD: 2.4 K/UL (ref 0.8–3.5)
LYMPHOCYTES NFR BLD: 23 % (ref 12–49)
MCH RBC QN AUTO: 31.2 PG (ref 26–34)
MCHC RBC AUTO-ENTMCNC: 34.1 G/DL (ref 30–36.5)
MCV RBC AUTO: 91.6 FL (ref 80–99)
MONOCYTES # BLD: 0.9 K/UL (ref 0–1)
MONOCYTES NFR BLD: 9 % (ref 5–13)
NEUTS SEG # BLD: 6.5 K/UL (ref 1.8–8)
NEUTS SEG NFR BLD: 62 % (ref 32–75)
NRBC # BLD: 0 K/UL (ref 0–0.01)
NRBC BLD-RTO: 0 PER 100 WBC
PLATELET # BLD AUTO: 163 K/UL (ref 150–400)
PMV BLD AUTO: 11.9 FL (ref 8.9–12.9)
POTASSIUM SERPL-SCNC: 4 MMOL/L (ref 3.5–5.1)
RBC # BLD AUTO: 3.94 M/UL (ref 4.1–5.7)
SODIUM SERPL-SCNC: 139 MMOL/L (ref 136–145)
WBC # BLD AUTO: 10.3 K/UL (ref 4.1–11.1)

## 2024-11-03 PROCEDURE — 36415 COLL VENOUS BLD VENIPUNCTURE: CPT

## 2024-11-03 PROCEDURE — 1100000003 HC PRIVATE W/ TELEMETRY

## 2024-11-03 PROCEDURE — 6360000002 HC RX W HCPCS: Performed by: STUDENT IN AN ORGANIZED HEALTH CARE EDUCATION/TRAINING PROGRAM

## 2024-11-03 PROCEDURE — 6370000000 HC RX 637 (ALT 250 FOR IP): Performed by: STUDENT IN AN ORGANIZED HEALTH CARE EDUCATION/TRAINING PROGRAM

## 2024-11-03 PROCEDURE — 6360000002 HC RX W HCPCS: Performed by: INTERNAL MEDICINE

## 2024-11-03 PROCEDURE — 80048 BASIC METABOLIC PNL TOTAL CA: CPT

## 2024-11-03 PROCEDURE — 2580000003 HC RX 258: Performed by: STUDENT IN AN ORGANIZED HEALTH CARE EDUCATION/TRAINING PROGRAM

## 2024-11-03 PROCEDURE — 85025 COMPLETE CBC W/AUTO DIFF WBC: CPT

## 2024-11-03 RX ORDER — TRAZODONE HYDROCHLORIDE 50 MG/1
50 TABLET, FILM COATED ORAL NIGHTLY
Status: DISCONTINUED | OUTPATIENT
Start: 2024-11-03 | End: 2024-11-03

## 2024-11-03 RX ADMIN — SODIUM CHLORIDE, PRESERVATIVE FREE 10 ML: 5 INJECTION INTRAVENOUS at 20:49

## 2024-11-03 RX ADMIN — SODIUM CHLORIDE, PRESERVATIVE FREE 10 ML: 5 INJECTION INTRAVENOUS at 08:19

## 2024-11-03 RX ADMIN — PAROXETINE HYDROCHLORIDE 40 MG: 20 TABLET, FILM COATED ORAL at 08:19

## 2024-11-03 RX ADMIN — ASPIRIN 81 MG: 81 TABLET, COATED ORAL at 08:19

## 2024-11-03 RX ADMIN — METOPROLOL SUCCINATE 25 MG: 25 TABLET, EXTENDED RELEASE ORAL at 08:19

## 2024-11-03 RX ADMIN — HYDRALAZINE HYDROCHLORIDE 10 MG: 20 INJECTION INTRAMUSCULAR; INTRAVENOUS at 23:21

## 2024-11-03 ASSESSMENT — PAIN SCALES - GENERAL
PAINLEVEL_OUTOF10: 0
PAINLEVEL_OUTOF10: 0

## 2024-11-04 ENCOUNTER — APPOINTMENT (OUTPATIENT)
Facility: HOSPITAL | Age: 79
DRG: 683 | End: 2024-11-04
Attending: INTERNAL MEDICINE
Payer: MEDICARE

## 2024-11-04 LAB
ANION GAP SERPL CALC-SCNC: 4 MMOL/L (ref 2–12)
BASOPHILS # BLD: 0.1 K/UL (ref 0–0.1)
BASOPHILS NFR BLD: 1 % (ref 0–1)
BUN SERPL-MCNC: 25 MG/DL (ref 6–20)
BUN/CREAT SERPL: 20 (ref 12–20)
CALCIUM SERPL-MCNC: 9.3 MG/DL (ref 8.5–10.1)
CHLORIDE SERPL-SCNC: 108 MMOL/L (ref 97–108)
CO2 SERPL-SCNC: 26 MMOL/L (ref 21–32)
CREAT SERPL-MCNC: 1.25 MG/DL (ref 0.7–1.3)
DIFFERENTIAL METHOD BLD: ABNORMAL
EOSINOPHIL # BLD: 0.4 K/UL (ref 0–0.4)
EOSINOPHIL NFR BLD: 3 % (ref 0–7)
ERYTHROCYTE [DISTWIDTH] IN BLOOD BY AUTOMATED COUNT: 12.3 % (ref 11.5–14.5)
GLUCOSE SERPL-MCNC: 104 MG/DL (ref 65–100)
HCT VFR BLD AUTO: 39.9 % (ref 36.6–50.3)
HGB BLD-MCNC: 13.6 G/DL (ref 12.1–17)
IMM GRANULOCYTES # BLD AUTO: 0.1 K/UL (ref 0–0.04)
IMM GRANULOCYTES NFR BLD AUTO: 1 % (ref 0–0.5)
LYMPHOCYTES # BLD: 2.5 K/UL (ref 0.8–3.5)
LYMPHOCYTES NFR BLD: 21 % (ref 12–49)
MCH RBC QN AUTO: 31.2 PG (ref 26–34)
MCHC RBC AUTO-ENTMCNC: 34.1 G/DL (ref 30–36.5)
MCV RBC AUTO: 91.5 FL (ref 80–99)
MONOCYTES # BLD: 1 K/UL (ref 0–1)
MONOCYTES NFR BLD: 8 % (ref 5–13)
NEUTS SEG # BLD: 8.1 K/UL (ref 1.8–8)
NEUTS SEG NFR BLD: 66 % (ref 32–75)
NRBC # BLD: 0 K/UL (ref 0–0.01)
NRBC BLD-RTO: 0 PER 100 WBC
PLATELET # BLD AUTO: 177 K/UL (ref 150–400)
PMV BLD AUTO: 12.1 FL (ref 8.9–12.9)
POTASSIUM SERPL-SCNC: 4 MMOL/L (ref 3.5–5.1)
RBC # BLD AUTO: 4.36 M/UL (ref 4.1–5.7)
SODIUM SERPL-SCNC: 138 MMOL/L (ref 136–145)
WBC # BLD AUTO: 12.1 K/UL (ref 4.1–11.1)

## 2024-11-04 PROCEDURE — 6370000000 HC RX 637 (ALT 250 FOR IP): Performed by: STUDENT IN AN ORGANIZED HEALTH CARE EDUCATION/TRAINING PROGRAM

## 2024-11-04 PROCEDURE — 2580000003 HC RX 258: Performed by: STUDENT IN AN ORGANIZED HEALTH CARE EDUCATION/TRAINING PROGRAM

## 2024-11-04 PROCEDURE — 97116 GAIT TRAINING THERAPY: CPT

## 2024-11-04 PROCEDURE — 85025 COMPLETE CBC W/AUTO DIFF WBC: CPT

## 2024-11-04 PROCEDURE — 97165 OT EVAL LOW COMPLEX 30 MIN: CPT

## 2024-11-04 PROCEDURE — 93880 EXTRACRANIAL BILAT STUDY: CPT

## 2024-11-04 PROCEDURE — 36415 COLL VENOUS BLD VENIPUNCTURE: CPT

## 2024-11-04 PROCEDURE — 97535 SELF CARE MNGMENT TRAINING: CPT

## 2024-11-04 PROCEDURE — 80048 BASIC METABOLIC PNL TOTAL CA: CPT

## 2024-11-04 PROCEDURE — 6360000002 HC RX W HCPCS: Performed by: STUDENT IN AN ORGANIZED HEALTH CARE EDUCATION/TRAINING PROGRAM

## 2024-11-04 PROCEDURE — 1100000003 HC PRIVATE W/ TELEMETRY

## 2024-11-04 RX ADMIN — PAROXETINE HYDROCHLORIDE 40 MG: 20 TABLET, FILM COATED ORAL at 08:31

## 2024-11-04 RX ADMIN — ACETAMINOPHEN 650 MG: 325 TABLET ORAL at 20:57

## 2024-11-04 RX ADMIN — HYDRALAZINE HYDROCHLORIDE 10 MG: 20 INJECTION INTRAMUSCULAR; INTRAVENOUS at 20:14

## 2024-11-04 RX ADMIN — METOPROLOL SUCCINATE 25 MG: 25 TABLET, EXTENDED RELEASE ORAL at 08:31

## 2024-11-04 RX ADMIN — SODIUM CHLORIDE, PRESERVATIVE FREE 10 ML: 5 INJECTION INTRAVENOUS at 20:11

## 2024-11-04 RX ADMIN — ASPIRIN 81 MG: 81 TABLET, COATED ORAL at 08:31

## 2024-11-04 ASSESSMENT — PAIN DESCRIPTION - DESCRIPTORS: DESCRIPTORS: ACHING

## 2024-11-04 ASSESSMENT — PAIN SCALES - GENERAL
PAINLEVEL_OUTOF10: 0
PAINLEVEL_OUTOF10: 0
PAINLEVEL_OUTOF10: 3

## 2024-11-04 ASSESSMENT — PAIN DESCRIPTION - ORIENTATION: ORIENTATION: MID

## 2024-11-04 NOTE — CARE COORDINATION
Care Management Initial Assessment       RUR: 15% (moderate RUR)  Readmission? No  1st IM letter given? Yes - IM done 10/31  1st [facility] letter given: No     1401 to 1413 - CM contacted friend/POA, Fransisco, to complete initial assessment.  Patient's preferred pharmacy is the [facility] on [address].  Patient is open with [facility] for PT/OT/SN services.  The SW visited on Thursday and the patient was having chest discomfort, so they instructor Fransisco to contact EMS.  Fransisco confirmed that the patient has a defibrillator.     Fransisco stated that patient smokes at home.  Patient was recently at [facility] and Fransisco said he was there for about a week.  Fransisco would not like for patient to return to [facility] and would instead prefer for him to go to [facility] before coming back home.  He is aware that additional choices are likely needed in the event that [facility] cannot accept.  He confirmed that the SNF list can be sent to: [email or web address].    3330 to 1449 - SNF list sent to Fransisco.  SNF referral sent to [facility] with note to verify SNF days left since he was recently in SNF.     11/04/24 1403   Service Assessment   Patient Orientation Person   Cognition Alert   History Provided By Friend  (Fransisco Dat)   Primary Caregiver Self   Accompanied By/Relationship N/A   Support Systems Family Members;Friends/Neighbors   Patient's Healthcare Decision Maker is: Named in Scanned ACP Document   PCP Verified by CM Yes   Last Visit to PCP Within last 3 months  (one month ago)   Prior Functional Level Assistance with the following:;Bathing;Dressing;Toileting;Mobility;Cooking;Housework;Shopping   Current Functional Level Assistance with the following:;Mobility;Bathing;Dressing;Toileting;Cooking;Housework;Shopping   Can patient return to prior living arrangement Yes   Ability to make needs known: Good   Family able to assist with home care needs: Yes   Would you like for me to discuss the discharge plan with any other family

## 2024-11-04 NOTE — CARE COORDINATION
CM verified patient has a qualifying hospital stay for Skilled Nursing Facility.  Pt admitted 10/31 and met 3 night stay criteria on the morning of 11/4.    CHATO Tafoya

## 2024-11-05 VITALS
BODY MASS INDEX: 20.04 KG/M2 | WEIGHT: 139.99 LBS | SYSTOLIC BLOOD PRESSURE: 144 MMHG | OXYGEN SATURATION: 98 % | DIASTOLIC BLOOD PRESSURE: 71 MMHG | HEIGHT: 70 IN | RESPIRATION RATE: 19 BRPM | TEMPERATURE: 97.8 F | HEART RATE: 76 BPM

## 2024-11-05 LAB
ANION GAP SERPL CALC-SCNC: 4 MMOL/L (ref 2–12)
BASOPHILS # BLD: 0.1 K/UL (ref 0–0.1)
BASOPHILS NFR BLD: 1 % (ref 0–1)
BUN SERPL-MCNC: 26 MG/DL (ref 6–20)
BUN/CREAT SERPL: 19 (ref 12–20)
CALCIUM SERPL-MCNC: 9.5 MG/DL (ref 8.5–10.1)
CHLORIDE SERPL-SCNC: 107 MMOL/L (ref 97–108)
CO2 SERPL-SCNC: 28 MMOL/L (ref 21–32)
CREAT SERPL-MCNC: 1.37 MG/DL (ref 0.7–1.3)
DIFFERENTIAL METHOD BLD: ABNORMAL
EOSINOPHIL # BLD: 0.4 K/UL (ref 0–0.4)
EOSINOPHIL NFR BLD: 3 % (ref 0–7)
ERYTHROCYTE [DISTWIDTH] IN BLOOD BY AUTOMATED COUNT: 12.7 % (ref 11.5–14.5)
GLUCOSE BLD STRIP.AUTO-MCNC: 112 MG/DL (ref 65–117)
GLUCOSE BLD STRIP.AUTO-MCNC: 135 MG/DL (ref 65–117)
GLUCOSE SERPL-MCNC: 122 MG/DL (ref 65–100)
HCT VFR BLD AUTO: 41.1 % (ref 36.6–50.3)
HGB BLD-MCNC: 14 G/DL (ref 12.1–17)
IMM GRANULOCYTES # BLD AUTO: 0.1 K/UL (ref 0–0.04)
IMM GRANULOCYTES NFR BLD AUTO: 1 % (ref 0–0.5)
LYMPHOCYTES # BLD: 2.5 K/UL (ref 0.8–3.5)
LYMPHOCYTES NFR BLD: 21 % (ref 12–49)
MCH RBC QN AUTO: 31.1 PG (ref 26–34)
MCHC RBC AUTO-ENTMCNC: 34.1 G/DL (ref 30–36.5)
MCV RBC AUTO: 91.3 FL (ref 80–99)
MONOCYTES # BLD: 1 K/UL (ref 0–1)
MONOCYTES NFR BLD: 8 % (ref 5–13)
NEUTS SEG # BLD: 7.8 K/UL (ref 1.8–8)
NEUTS SEG NFR BLD: 66 % (ref 32–75)
NRBC # BLD: 0 K/UL (ref 0–0.01)
NRBC BLD-RTO: 0 PER 100 WBC
PLATELET # BLD AUTO: 187 K/UL (ref 150–400)
PMV BLD AUTO: 12.2 FL (ref 8.9–12.9)
POTASSIUM SERPL-SCNC: 4 MMOL/L (ref 3.5–5.1)
RBC # BLD AUTO: 4.5 M/UL (ref 4.1–5.7)
SERVICE CMNT-IMP: ABNORMAL
SERVICE CMNT-IMP: NORMAL
SODIUM SERPL-SCNC: 139 MMOL/L (ref 136–145)
WBC # BLD AUTO: 11.8 K/UL (ref 4.1–11.1)

## 2024-11-05 PROCEDURE — 36415 COLL VENOUS BLD VENIPUNCTURE: CPT

## 2024-11-05 PROCEDURE — 6360000002 HC RX W HCPCS: Performed by: INTERNAL MEDICINE

## 2024-11-05 PROCEDURE — 6370000000 HC RX 637 (ALT 250 FOR IP): Performed by: STUDENT IN AN ORGANIZED HEALTH CARE EDUCATION/TRAINING PROGRAM

## 2024-11-05 PROCEDURE — 85025 COMPLETE CBC W/AUTO DIFF WBC: CPT

## 2024-11-05 PROCEDURE — 80048 BASIC METABOLIC PNL TOTAL CA: CPT

## 2024-11-05 PROCEDURE — 2580000003 HC RX 258: Performed by: STUDENT IN AN ORGANIZED HEALTH CARE EDUCATION/TRAINING PROGRAM

## 2024-11-05 PROCEDURE — 82962 GLUCOSE BLOOD TEST: CPT

## 2024-11-05 RX ADMIN — METOPROLOL SUCCINATE 25 MG: 25 TABLET, EXTENDED RELEASE ORAL at 08:03

## 2024-11-05 RX ADMIN — ENOXAPARIN SODIUM 40 MG: 100 INJECTION SUBCUTANEOUS at 12:22

## 2024-11-05 RX ADMIN — PAROXETINE HYDROCHLORIDE 40 MG: 20 TABLET, FILM COATED ORAL at 08:03

## 2024-11-05 RX ADMIN — SODIUM CHLORIDE, PRESERVATIVE FREE 10 ML: 5 INJECTION INTRAVENOUS at 08:03

## 2024-11-05 RX ADMIN — ASPIRIN 81 MG: 81 TABLET, COATED ORAL at 08:03

## 2024-11-05 ASSESSMENT — PAIN SCALES - GENERAL: PAINLEVEL_OUTOF10: 0

## 2024-11-05 NOTE — PROGRESS NOTES
[facility] EP/ ARRHYTHMIA/ CARDIOLOGY CONSULT    Electrophysiology Service  11/2/2024     Assessment:   1. Syncopal episode of uncertain cause.  2. History of ischemic cardiomyopathy with prior coronary artery bypass grafting, known ejection fraction 25% with negative cardiac enzymes on admission.  3. Chronic systolic heart failure.  4. Prior ICD placement in February.  5. Hypertension.  6. Type 2 diabetes.  7. Dyslipidemia.  8. Apparent underlying dementia.  9. History of abdominal aortic aneurysm with prior endovascular aneurysm repair procedure.    Plan:   Will have ICD interrogated today  Will need carotid us  If all normal, can follow up with Dr. Fay in clinic      HPI:  The patient is a 79-year-old man followed by Dr. Iban Fay.  He has a history of an ischemic cardiomyopathy with prior coronary artery bypass grafting in 2013.  He has a known EF of 25%.  He does have an implantable defibrillator placed by Dr. Aldrich in February.  He underwent catheterization by Dr. Fay back in February that showed 3-vessel native coronary disease with all bypass grafts patent.  He had a syncopal episode at that time prompting the defibrillator placement.     The patient is a poor historian.  He also has a history of hypertension, dyslipidemia, and type 2 diabetes.  He apparently has some dementia and in the past has lived with several friends.  He was admitted after he presented to the emergency room following a syncopal episode.  Reportedly, there was some chest pain as well.  He did have some reproducible right side chest wall pain on admission.  Cardiac enzymes were negative and we were asked to see the patient for evaluation.  He has a [employer] defibrillator.  No other complaints at this time.       Allergies   Allergen Reactions    Penicillin G Rash     Past Medical History:   Diagnosis Date    Aneurysm (HCC)     abdominal    Arthritis     CAD (coronary artery disease)     heart attack    Heart 
                    [facility] EP/ ARRHYTHMIA/ CARDIOLOGY CONSULT    Electrophysiology Service  11/3/2024     Assessment:   Syncopal episode of uncertain cause.  History of ischemic cardiomyopathy with prior coronary artery bypass grafting, known ejection fraction 25% with negative cardiac enzymes on admission.  Chronic systolic heart failure.  Prior ICD placement in February.  Hypertension.  Type 2 diabetes.  Dyslipidemia.  Apparent underlying dementia.  History of abdominal aortic aneurysm with prior endovascular aneurysm repair procedure.    Plan:   ICD was interrogated today. No arrhtyhmias  Will need carotid us  If all normal, can follow up with Dr. Fay in clinic  Dr. Williamson will be back tomorrow.       HPI:  The patient is a 79-year-old man followed by Dr. Iban Fay.  He has a history of an ischemic cardiomyopathy with prior coronary artery bypass grafting in 2013.  He has a known EF of 25%.  He does have an implantable defibrillator placed by Dr. Aldrich in February.  He underwent catheterization by Dr. Fay back in February that showed 3-vessel native coronary disease with all bypass grafts patent.  He had a syncopal episode at that time prompting the defibrillator placement.     The patient is a poor historian.  He also has a history of hypertension, dyslipidemia, and type 2 diabetes.  He apparently has some dementia and in the past has lived with several friends.  He was admitted after he presented to the emergency room following a syncopal episode.  Reportedly, there was some chest pain as well.  He did have some reproducible right side chest wall pain on admission.  Cardiac enzymes were negative and we were asked to see the patient for evaluation.  He has a [employer] defibrillator.  No other complaints at this time.       Allergies   Allergen Reactions    Penicillin G Rash     Past Medical History:   Diagnosis Date    Aneurysm (HCC)     abdominal    Arthritis     CAD (coronary artery disease)     heart 
      Hospitalist Progress Note    NAME:   Anshu Bonilla Jr.   : 1945   MRN: [id number]     Date/Time: 2024 11:54 AM  Patient PCP: George Carr MD    Estimated discharge date: 48 hrs pending clinical improvement      Assessment / Plan:    Atypical right-sided chest pain  CAD s/p CABG  Chronic systolic heart failure  Hypertension  S/p placement  -Troponin x 2 negative  -Echo from 2024 revealed severely reduced left ventricular systolic function with EF of %.  Severe global hypokinesis.  -s/p fluids resuscitation in the ER. orthostatic vitals +, pt appears dry today with not much po intake per RN, will resume low rate NS at 50cc/hr x12 hrs.  Encourage po inta  -Continue ASA and metoprolol.  Hold losartan and atorvastatin given KAVON  -IV hydralazine as needed for hypertension  -Cardiology consult  -Telemetry     Leukocytosis  -WBC is wnl today.  Pt is afebrile.  CXR revealed no acute process  -UA abnl.  Ucx pending  -cont' IV rocephin     KAVON on CKID3  -Creatinine 2.04, s/p IVF with improvement with cr 1.35 today, currently down to baseline ~1.4  -Hold losartan and Lipitor for now  -resume low rate  IVF.   Poor po intake today, at risk for dehydration  -follow labs    Syncopal episode, could be multifactorial and likely related to above  Orthostatic hypotension  -CT of the head without contrast revealed no acute intracranial abnormality  -Carotid duplex from 2024 revealed no stenosis in the right internal carotid artery.  Heterogenous plaque in the right internal carotid artery.  No stenosis in the left internal carotid artery.  Normal antegrade flow in the right vertebral artery.  Normal antegrade flow in the left vertebral artery  -f/u with  TSH and free T4  as ordered  -orthostatic vitals +, pt appears dry today with not much po intake per RN, will resume low rate NS at 50cc/hr x12 hrs.  Encourage po intake  -PT/OT to eval     Depression  -Continue Paxil    Family (pt's sister and 
      Hospitalist Progress Note    NAME:   Anshu Bonilla Jr.   : 1945   MRN: [id number]     Date/Time: 11/3/2024 10:50 AM  Patient PCP: George Carr MD    Estimated discharge date: 24-48 hrs pending clinical improvement      Assessment / Plan:    Atypical right-sided chest pain  CAD s/p CABG  Chronic systolic heart failure  Hypertension  S/p placement  -Troponin x 2 negative  -Echo from 2024 revealed severely reduced left ventricular systolic function with EF of %.  Severe global hypokinesis.  -s/p fluids resuscitation in the ER. orthostatic vitals + s/p IVF resuscitation  -Continue ASA and metoprolol.  Hold losartan and atorvastatin given KAVON  -IV hydralazine as needed for hypertension  -Cardiology consult  -Telemetry     Leukocytosis  -WBC is wnl today.  Pt is afebrile.  CXR revealed no acute process  -UA abnl but Ucx neg.  Received IV abx, will discontinue given neg ucx.    KAVON on CKD3  -Creatinine 2.04, s/p IVF with improvement with ~ cr 1.3   -Hold losartan and Lipitor for now  -s/p IVF, encourage po intake  -follow labs    Syncopal episode, could be multifactorial and likely related to above  Orthostatic hypotension  -CT of the head without contrast revealed no acute intracranial abnormality  -Carotid duplex from 2024 revealed no stenosis in the right internal carotid artery.  Heterogenous plaque in the right internal carotid artery.  No stenosis in the left internal carotid artery.  Normal antegrade flow in the right vertebral artery.  Normal antegrade flow in the left vertebral artery  -was orthostatic vitals +, s/p IVF.  Encourage po intake.    -PT/OT to eval     Depression  -Continue Paxil      Medical Decision Making:   I personally reviewed labs  I personally reviewed imaging  Toxic drug monitoring  I personally reviewed EKG  Discussed case with: RN, plan of care discussed        Code Status: full  DVT Prophylaxis: lovenox    Subjective:   No new complaint.  Confused. 
      Hospitalist Progress Note    NAME:   Anshu Bonilla Jr.   : 1945   MRN: [id number]     Date/Time: 2024 2:36 PM  Patient PCP: George Carr MD    Estimated discharge date:      Assessment / Plan:    Atypical right-sided chest pain  CAD s/p CABG  Chronic systolic heart failure  Hypertension  S/p placement  -Troponin x 2 negative  -Echo from 2024 revealed severely reduced left ventricular systolic function with EF of %.  Severe global hypokinesis.  -s/p fluids resuscitation in the ER.  Will stop maintenance IVF given pt's hx of CHF and also currently tolerating po intake well.  -Continue ASA and metoprolol.  Hold losartan and atorvastatin given KAVON  -IV hydralazine as needed for hypertension  -Cardiology consult  -Telemetry     Leukocytosis  -WBC is down trending, afebrile.  CXR revealed no acute process  -UA abnl.  Add Ucx  -empiric IV abx     KAVON on CKID3  -Creatinine 2.04, s/p IVF with improvement with cr, currently down to baseline ~1.4  -Hold losartan and Lipitor for now  -will dc IVF given hx of CHF.  Pt is tolerating po intake currently  -follow labs    Syncopal episode, could be multifactorial and likely related to above  -CT of the head without contrast revealed no acute intracranial abnormality  -Carotid duplex from 2024 revealed no stenosis in the right internal carotid artery.  Heterogenous plaque in the right internal carotid artery.  No stenosis in the left internal carotid artery.  Normal antegrade flow in the right vertebral artery.  Normal antegrade flow in the left vertebral artery  -f/u with  TSH and free T4  as ordered  -check orthostatic vitals     Depression  -Continue Paxil          Medical Decision Making:   I personally reviewed labs  I personally reviewed imaging  Toxic drug monitoring  I personally reviewed EKG  Discussed case with: RNFLOR, discussed plan of care and dispo during interdisciplinary round        Code Status: full  DVT Prophylaxis: 
  Physician Progress Note      PATIENT:               REBECCA OLEA  [facility] #:                  [id number]  :                       1945  ADMIT DATE:       10/31/2024 3:18 PM  DISCH DATE:  RESPONDING  PROVIDER #:        Cecilia Day MD          QUERY TEXT:    79yoFM patient admitted with Syncope and Atypical chest pain noted to be   multifactorial.  If possible, please document in progress notes and discharge   summary after study the etiology of the syncope:    The medical record reflects the following:  Risk Factors: hx chronic systolic HF, HTN, smoker  Clinical Indicators: presents to ED after a syncopal episode, with c/o   reproducible right lower rib/chest wall pain.  Labs noted for Creatinine 2.04 , Troponin x2 neg, WBC 18.1.  ED noted ' Unclear etiology but may be orthostatic in the setting of KAVON,   orthostatic vital signs.  Patient does report single incident of chest pain   concurrent with syncope but with troponin negative x 2 and nonischemic EKG   lower suspicion of ACS  Treatment: fluids resuscitation in ED, CT head, CXR, urine cx, follow labs,   A1C, telemetry, Cardiology consult.    Thank you,  Katlin Morgan RN, CDI  Options provided:  -- Syncope due to KAVON  -- Syncope due to Leukocytosis.  -- Syncope due to KAVON and Leukocytosis  -- Other - I will add my own diagnosis  -- Disagree - Not applicable / Not valid  -- Disagree - Clinically unable to determine / Unknown  -- Refer to Clinical Documentation Reviewer    PROVIDER RESPONSE TEXT:    The syncope is due to KAVON.    Query created by: Katlin Morgan on 2024 4:44 PM      Electronically signed by:  Cecilia Day MD 2024 2:08 PM          
0700: Bedside and Verbal shift change report given to Keisha Quezada RN (oncoming nurse) by RANDY Thompson (offgoing nurse). Report included the following information Nurse Handoff Report, Adult Overview, Intake/Output, MAR, and Recent Results.     Uneventful shift    1713:   Discharge Summary     Patient: Anshu Bonilla Jr. MRN: [id number]  SSN: [id number]    YOB: 1945  Age: 79 y.o.  Sex: male       Code Status: Full  Allergies:   Allergies   Allergen Reactions    Penicillin G Rash       Admit Date: 10/31/2024    Discharge Date: 2024      Admission Diagnoses: Syncope, unspecified syncope type [R55]    PMH:   Past Medical History:   Diagnosis Date    Aneurysm (HCC)     abdominal    Arthritis     CAD (coronary artery disease)     heart attack    Heart failure (HCC)     CHF    Ill-defined condition 2017    nuclear stress test - led to catherization being done    Ill-defined condition     pre-COPD    Ill-defined condition     pre-diabetes    Pacemaker     [facility]       Social History:  Social history   Social History     Tobacco Use    Smoking status: Every Day     Current packs/day: 0.00     Types: Cigarettes     Last attempt to quit: 2013     Years since quittin.6    Smokeless tobacco: Never    Tobacco comments:     Quit smoking: started smoking again   Substance Use Topics    Alcohol use: Yes     Drug History   Social History     Substance and Sexual Activity   Drug Use Not on file     Familiy History   Family History   Problem Relation Age of Onset    Stroke Maternal Grandfather     Heart Disease Mother     Elevated Lipids Mother     Hypertension Mother     Heart Disease Paternal Grandmother     Diabetes Mother     Asthma Mother     Osteoarthritis Mother     Heart Disease Paternal Grandfather     Heart Disease Maternal Grandmother     Heart Disease Father     Osteoarthritis Sister     Heart Disease Maternal Grandfather     Elevated Lipids Sister     Diabetes Sister  
0700: Bedside and Verbal shift change report given to Prasanna Avalos RN   (oncoming nurse) by RANDY Ferreira (offgoing nurse). Report included the following information Nurse Handoff Report, Index, Adult Overview, Intake/Output, MAR, Recent Results, and Cardiac Rhythm A-Pace .     End of Shift Note    Bedside shift change report given to RANDY Thompson (oncoming nurse) by Prasanna Avalos RN (offgoing nurse).  Report included the following information SBAR, Kardex, Intake/Output, MAR, Recent Results, and Cardiac Rhythm A Pace    Shift worked:  [id number]     Shift summary and any significant changes:     none     Concerns for physician to address:  none     Zone phone for oncoming shift:   N/a         Prasanna Avalos RN                            
0700: Bedside and Verbal shift change report given to Prasanna Avalos RN   (oncoming nurse) by RANDY Shahid (offgoing nurse). Report included the following information Nurse Handoff Report, Index, Adult Overview, Intake/Output, MAR, Recent Results, and Cardiac Rhythm A-Pace .     1555: Phone call from Fransisco Daugherty wanting to know updates on discharge planning. Told him I would call case management and see if I could have them call him. He voiced understanding. Phone call to Susan, no answer. Susan returned my phone call and she will call Fransisco and provide him with updates.     End of Shift Note    Bedside shift change report given to RANDY Ferreira (oncoming nurse) by Prasanna Avalos RN (offgoing nurse).  Report included the following information SBAR, Kardex, Intake/Output, MAR, Recent Results, and Cardiac Rhythm A-Pace    Shift worked:  [id number]     Shift summary and any significant changes:     none     Concerns for physician to address:  none     [facility]   [phone]       Activity:     Number times ambulated in hallways past shift: 0  Number of times OOB to chair past shift: 0    Cardiac:   Cardiac Monitoring: Yes           Access:  Current line(s): PIV     Genitourinary:   Urinary status: voiding    Respiratory:      Chronic home O2 use?: NO  Incentive spirometer at bedside: NO       GI:     Current diet:  ADULT DIET; Regular; Low Fat/Low Chol/High Fiber/JASON  Passing flatus: YES  Tolerating current diet: YES       Pain Management:   Patient states pain is manageable on current regimen: YES    Skin:     Interventions: turn team, float heels, and limit briefs    Patient Safety:  Fall Score:    Interventions: bed/chair alarm, assistive device (walker, cane. etc), gripper socks, pt to call before getting OOB, and stay with me (per policy)       Length of Stay:  Expected LOS: 4  Actual LOS: 2      Prasanna Avalos RN                            
0700: Bedside and Verbal shift change report given to Prasanna Avalos RN   (oncoming nurse) by RANDY Thompson (offgoing nurse). Report included the following information Nurse Handoff Report, Index, Adult Overview, Intake/Output, MAR, Recent Results, and Cardiac Rhythm A-Pace .     1050: Spoke with Fransisco to provide updates. Answered all questions.    End of Shift Note    Bedside shift change report given to RANDY Thompson (oncoming nurse) by Prasanna Avalos RN (offgoing nurse).  Report included the following information SBAR, Kardex, Intake/Output, MAR, Recent Results, and Cardiac Rhythm A pace    Shift worked:  [phone]     Shift summary and any significant changes:     none     Concerns for physician to address:  none     Zone phone for oncoming shift:   N/a         Prasanna Avalos RN                            
1900 Bedside and Verbal shift change report given to Jay RN (oncoming nurse) by Nirmala RN (offgoing nurse). Report included the following information Nurse Handoff Report, MAR, Recent Results, and Cardiac Rhythm NSR.     2014 BP is 172/97, PRN hydralazine given.    2055 Latest BP is 138/83.    2100 Complain of right sided chest discomfort from cough as per pt. PRN tylenol given. Pt was sleeping at 2130.     0320 Blood samples collected and sent to lab.    0700 Bedside shift change report given to Keisha PADILLA (oncoming nurse) by Jay Louis RN (offgoing nurse).  Report included the following information SBAR, MAR, Recent Results, and Cardiac Rhythm NSR  
1900 Bedside and Verbal shift change report given to Jay RN (oncoming nurse) by Paul RN (offgoing nurse). Report included the following information Nurse Handoff Report, MAR, Recent Results, and Cardiac Rhythm A Paced.     2325 BP is 176/87, PRN Hydralazine given.    2355 BP recheck 127/90.     0330 Blood sample collected and sent to lab.    0700 Bedside shift change report given to Nirmala PADILLA (oncoming nurse) by Jay Louis RN (offgoing nurse).  Report included the following information SBAR, MAR, Recent Results, and Cardiac Rhythm A Paced  
Bedside shift change report given to Kleber (oncoming nurse) by Amy (offgoing nurse). Report included the following information Nurse Handoff Report, Intake/Output, MAR, Recent Results, Med Rec Status, and Cardiac Rhythm Atrial Paced .     End of Shift Note    Bedside shift change report given to Nirmala (oncoming nurse) by Kleber Adames RN (offgoing nurse).  Report included the following information SBAR    Shift worked:  7p-7a     Shift summary and any significant changes:     na     Concerns for physician to address:  na     Zone phone for oncoming shift:   na       Activity:     Number times ambulated in hallways past shift: 0  Number of times OOB to chair past shift: 0    Cardiac:   Cardiac Monitoring: Yes           Access:  Current line(s): PIV     Genitourinary:   Urinary status: voiding    Respiratory:      Chronic home O2 use?: NO  Incentive spirometer at bedside: NO       GI:     Current diet:  ADULT DIET; Regular; Low Fat/Low Chol/High Fiber/JASON  Passing flatus: YES  Tolerating current diet: YES       Pain Management:   Patient states pain is manageable on current regimen: YES    Skin:     Interventions: float heels, increase time out of bed, and limit briefs    Patient Safety:  Fall Score:    Interventions: bed/chair alarm, gripper socks, pt to call before getting OOB, and stay with me (per policy)       Length of Stay:  Expected LOS: 4  Actual LOS: 2      Kleber Adames, RN                              
Cardiology Progress Note      11/4/2024 2:42 PM    Admit Date: 10/31/2024          Subjective: Up in chair. Has some c/o dizziness but no syncope. Carotid dulpex by prelim report shows mild dz          BP (!) 104/55   Pulse 64   Temp 98 °F (36.7 °C) (Oral)   Resp 16   Ht 1.778 m (5' 10\")   Wt 63.5 kg (139 lb 15.9 oz)   SpO2 96%   BMI 20.09 kg/m²   11/02 1901 - 11/04 0700  In: 1305.5 [P.O.:900; I.V.:405.5]  Out: 1850 [Urine:1850]        Objective:      Physical Exam:  VS as above     Data Review:   Labs:    Hgb 13.6  BUN 25  Creat 1.25    Telemetry: SR       Assessment:       Syncopal episode of uncertain cause.  History of ischemic cardiomyopathy with prior coronary artery bypass grafting, known ejection fraction 25% with negative cardiac enzymes on admission.  Chronic systolic heart failure.  Prior ICD placement in February.  Hypertension.  Type 2 diabetes.  Dyslipidemia.  Apparent underlying dementia.  History of abdominal aortic aneurysm with prior endovascular aneurysm repair procedure.    Plan:No obvious cardiac or vascular cause for syncope. Cont current Rx. Can see Dr Fay in f/u . Will see back as inpt on request              
Occupational Therapy    Patient chart reviewed up to date. Attempted visit, receiving patient resting in bed. He politely declines participation at this time as hoping to rest and take a nap. Amenable to therapy continuing to follow, reports preference for mid-day sessions as able. Note possible discharge to SNF today.     Meme Wagoner, CATRINAR/L    
Orthostatics done. Pls see flowsheet.   
P&T-Approved DVT Prophylaxis Dosing    Per P&T Committee-approved protocol lovenox 40 mg daily has been adjusted to lovenox 30 mg daily based on weight and renal function as shown in the table below.         Guanaco Mays RPH           
Patient refused to have orthostatic bp taken at this time. Will try again later.   
Patient's bed alarm went off, patient was already standing and had incontinence and continued urinating on the plate on bed side table.  Patient was assisted to use the urinal. Patient stated: \"Do what you got to do.\"   
Pharmacy Automatic Renal Dosing Adjustment     Labs:  Recent Labs     Units 24  0521 24  0526 10/31/24  1445   CREATININE MG/DL 1.35* 1.48* 2.04*   BUN MG/DL 29* 38* 43*   WBC K/uL 9.8 12.3* 18.1*     Temp (24hrs), Av.1 °F (36.7 °C), Min:97.6 °F (36.4 °C), Max:98.9 °F (37.2 °C)      Creatinine Clearance (mL/min) or Dialysis: Estimated Creatinine Clearance: 41 mL/min (A) (based on SCr of 1.35 mg/dL (H)).      Impression/Plan:   Enoxaparin has been adjusted from 30mg subq daily to 40mg subq daily based on the renal dosing protocol.     Pharmacy will follow daily and adjust medications as appropriate for renal function.    Thank you,  Maximo Yeung, [facility]   
Received cardiology consult.  The pt follows with [facility].  Will defer plan to their team.    ROSALIE Stinson NP  11/1/2024  9:57 AM    [facility]  [address] [place] [address] [phone]  [email or web address]     
TRANSFER - IN REPORT:    Verbal report received from Emerson PADILLA on Anshu Bonilla Jr.  being received from ED for routine progression of patient care      Report consisted of patient's Situation, Background, Assessment and   Recommendations(SBAR).     Information from the following report(s) Nurse Handoff Report, ED Encounter Summary, Intake/Output, MAR, Recent Results, and Cardiac Rhythm Atrial Paced  was reviewed with the receiving nurse.    Opportunity for questions and clarification was provided.      Assessment completed upon patient's arrival to unit and care assumed.    End of Shift Note    Bedside shift change report given to Amy (oncoming nurse) by Kleber Adames RN (offgoing nurse).  Report included the following information SBAR    Shift worked:  7p-7a     Shift summary and any significant changes:     na     Concerns for physician to address:  na     Zone phone for oncoming shift:   na       Activity:     Number times ambulated in hallways past shift: 0  Number of times OOB to chair past shift: 0    Cardiac:   Cardiac Monitoring: Yes           Access:  Current line(s): PIV     Genitourinary:   Urinary status: voiding    Respiratory:      Chronic home O2 use?: NO  Incentive spirometer at bedside: NO       GI:     Current diet:  ADULT DIET; Regular; Low Fat/Low Chol/High Fiber/JASON  Passing flatus: YES  Tolerating current diet: YES       Pain Management:   Patient states pain is manageable on current regimen: YES    Skin:     Interventions: float heels and limit briefs    Patient Safety:  Fall Score:    Interventions: bed/chair alarm, gripper socks, pt to call before getting OOB, and stay with me (per policy)       Length of Stay:  Expected LOS: 3  Actual LOS: 1      Kleber Adames RN                              
Making:   I personally reviewed labs  I personally reviewed imaging  Toxic drug monitoring  I personally reviewed EKG  Discussed case with: RN, plan of care discussed        Code Status: full  DVT Prophylaxis: lovenox    Subjective:   Up in chair, mentation is much better today.   Discussed with RN events overnight.       Objective:     VITALS:   Last 24hrs VS reviewed since prior progress note. Most recent are:  Patient Vitals for the past 24 hrs:   BP Temp Temp src Pulse Resp SpO2   11/04/24 1056 (!) 104/55 98 °F (36.7 °C) Oral 64 -- --   11/04/24 0721 133/81 98.1 °F (36.7 °C) Oral 61 16 96 %   11/04/24 0317 127/69 98 °F (36.7 °C) Oral -- -- 95 %   11/03/24 2321 (!) 176/87 98.2 °F (36.8 °C) Oral -- -- 97 %   11/03/24 2050 -- 98.1 °F (36.7 °C) Oral -- 16 97 %   11/03/24 1515 131/72 98 °F (36.7 °C) Oral 63 -- --         Intake/Output Summary (Last 24 hours) at 11/4/2024 1201  Last data filed at 11/4/2024 0814  Gross per 24 hour   Intake 780 ml   Output 1250 ml   Net -470 ml        I had a face to face encounter and independently examined this patient on 11/4/2024, as outlined below:  PHYSICAL EXAM:  General: Pleasant.  NAD  EENT:  EOMI. Anicteric sclerae. MMM  Resp:  CTA bilaterally, no wheezing or rales.  No accessory muscle use  CV:  Regular  rhythm,  No edema  GI:  Soft, Non distended, Non tender.  +Bowel sounds  Neurologic:  Awake, conversant, AAOx2, moving all exts  Psych:    Not anxious nor agitated  Skin:  No rashes.  No jaundice    Reviewed most current lab test results and cultures  YES  Reviewed most current radiology test results   YES  Review and summation of old records today    NO  Reviewed patient's current orders and MAR    YES  PMH/SH reviewed - no change compared to H&P      Procedures: see electronic medical records for all procedures/Xrays and details which were not copied into this note but were reviewed prior to creation of Plan.      LABS:  I reviewed today's most current labs and imaging

## 2024-11-05 NOTE — CARE COORDINATION
Patient is clear from a CM standpoint.    [facility]: [phone]    Transition of Care Plan: [facility], then return to friend, Fransisco's, home or half-way if arranged.    RUR: 13% (low RUR)  Prior Level of Functioning: Assistance with ADLs  Disposition: [facility], then return to friend, [facility]isco[facility], home or [facility] if arranged.  DEYSI: 11/5/2024  If SNF or IPR: Date FOC offered: 11/4/2024  Date FOC received: 11/4 [facility]  Accepting facility: [facility]  Date authorization started with reference number: N/A - 3NS verified  Date authorization received and expires: N/A  Follow up appointments: defer to SNF.  DME needed: defer to SNF.  Patient has a cane, RW and rollator at home but friend states he was instructed not to use rollator and RW is in poor condition.  Transportation at discharge: [facility] at 5 PM, to [facility], room 39A, nursing call report to: [phone] ([facility]) or main number at: [phone].  Copy of POA left on bedside chart.  IM/IMM Medicare/ letter given: 2nd IM done 11/5/2024.  Is patient a [place] and connected with [facility]? No.   If yes, was [facility] transfer form completed and [facility] notified? N/A  Caregiver Contact: Fransisco mills - [phone] - [email or web address]   Discharge Caregiver contacted prior to discharge? Aware of discharge today.  Care Conference needed? No.  Barriers to discharge: None.    0818 -  acknowledged hospice consultation.  CM contacted Fransisco regarding hospice to see if he wanted to speak with an agency for further information.  Fransisco did not seem to understand hospice and said he did not speak with anyone regarding this.  Fransisco is agreeable to having MD reach out to him.  CM sent message to attending to speak with him regarding hospice.  If hospice services are needed for patient and he is not GIP appropriate, he will either need to return to a [facility]isco's residence/an alternate residence or pay privately to live in facility as he does not have Medicaid insurance for

## 2024-11-05 NOTE — DISCHARGE SUMMARY
Discharge Summary    Name: Anshu Bonilla Jr.  [id number]  YOB: 1945 (Age: 79 y.o.)   Date of Admission: 10/31/2024  Date of Discharge: 11/5/2024  Attending Physician: Cecilia Day MD    Discharge Diagnosis:   Atypical right-sided chest pain  CAD s/p CABG  Chronic systolic heart failure  Hypertension  S/p placement  Consultations:  IP CONSULT TO CARDIOLOGY  IP CONSULT TO CASE MANAGEMENT      Brief Admission History/Reason for Admission Per Mayank Son MD:   Anshu Bonilla is a 79 y.o.  male with PMHx significant for CAD s/p CABG, chronic systolic heart failure and s/p pacemaker who presented to the emergency department after experiencing a syncopal episode.  Patient is a poor historian and believes it is 1926 so unable to provide much information at this time. However, patient only complaint at this time is reproducible right lower rib/chest wall pain.  He denies any fever, chills, nausea, vomiting, abdominal pain, headache, dizziness, visual changes, urinary complaints or any other problems at this time.     In ED lab was obtained.  Creatinine 2.04.  Troponin x 2 negative.  WBC 18.1.  CT of the head without contrast revealed no acute intracranial abnormality.  CXR revealed no acute process.  Patient provided 1 L bolus of LR     We were asked to admit for work up and evaluation of the above problems.      Brief Hospital Course by Main Problems:   Atypical right-sided chest pain  CAD s/p CABG  Chronic systolic heart failure  Hypertension  S/p placement  Atypical right-sided chest pain  CAD s/p CABG  Chronic systolic heart failure  Hypertension  S/p placement  Troponin x 2 negative.  Echo from 2/12/2024 revealed severely reduced left ventricular systolic function with EF of 2025%.  Severe global hypokinesis.  s/p fluids resuscitation in the ER. orthostatic vitals + s/p IVF resuscitation.Continue ASA and metoprolol.  Hold losartan given KAVON and soft BP

## 2024-11-05 NOTE — PLAN OF CARE
Problem: Discharge Planning  Goal: Discharge to home or other facility with appropriate resources  11/3/2024 1037 by Nirmala Avalos RN  Outcome: Progressing  Flowsheets  Taken 11/3/2024 1037  Discharge to home or other facility with appropriate resources:   Identify barriers to discharge with patient and caregiver   Identify discharge learning needs (meds, wound care, etc)  Taken 11/3/2024 0815  Discharge to home or other facility with appropriate resources: Identify barriers to discharge with patient and caregiver  11/3/2024 0327 by Hanna Nelson RN  Outcome: Progressing     Problem: Pain  Goal: Verbalizes/displays adequate comfort level or baseline comfort level  11/3/2024 1037 by Nirmala Avalos RN  Outcome: Progressing  Flowsheets (Taken 11/2/2024 0802)  Verbalizes/displays adequate comfort level or baseline comfort level:   Encourage patient to monitor pain and request assistance   Administer analgesics based on type and severity of pain and evaluate response   Consider cultural and social influences on pain and pain management   Assess pain using appropriate pain scale   Implement non-pharmacological measures as appropriate and evaluate response   Notify Licensed Independent Practitioner if interventions unsuccessful or patient reports new pain  11/3/2024 0327 by Hanna Nelson RN  Outcome: Progressing     Problem: Risk for Elopement  Goal: Patient will not exit the unit/facility without proper excort  11/3/2024 0327 by Hanna Nelson RN  Outcome: Progressing     Problem: Safety - Adult  Goal: Free from fall injury  11/3/2024 1037 by Nirmala Avalos RN  Outcome: Progressing  Flowsheets (Taken 11/3/2024 1037)  Free From Fall Injury: Instruct family/caregiver on patient safety  11/3/2024 0327 by Hanna Nelson RN  Outcome: Progressing     
  Problem: Discharge Planning  Goal: Discharge to home or other facility with appropriate resources  11/3/2024 2220 by Jay Louis RN  Outcome: Progressing  11/3/2024 1037 by Nirmala Avalos RN  Outcome: Progressing  Flowsheets  Taken 11/3/2024 1037  Discharge to home or other facility with appropriate resources:   Identify barriers to discharge with patient and caregiver   Identify discharge learning needs (meds, wound care, etc)  Taken 11/3/2024 0815  Discharge to home or other facility with appropriate resources: Identify barriers to discharge with patient and caregiver     Problem: Pain  Goal: Verbalizes/displays adequate comfort level or baseline comfort level  11/3/2024 2220 by Jay Louis RN  Outcome: Progressing  11/3/2024 1037 by Nirmala Avalos RN  Outcome: Progressing  Flowsheets (Taken 11/2/2024 0802)  Verbalizes/displays adequate comfort level or baseline comfort level:   Encourage patient to monitor pain and request assistance   Administer analgesics based on type and severity of pain and evaluate response   Consider cultural and social influences on pain and pain management   Assess pain using appropriate pain scale   Implement non-pharmacological measures as appropriate and evaluate response   Notify Licensed Independent Practitioner if interventions unsuccessful or patient reports new pain     Problem: Risk for Elopement  Goal: Patient will not exit the unit/facility without proper excort  Outcome: Progressing     Problem: Safety - Adult  Goal: Free from fall injury  11/3/2024 2220 by Jay Louis RN  Outcome: Progressing  11/3/2024 1037 by Nirmala Avalos RN  Outcome: Progressing  Flowsheets (Taken 11/3/2024 1037)  Free From Fall Injury: Instruct family/caregiver on patient safety     
  Problem: Discharge Planning  Goal: Discharge to home or other facility with appropriate resources  11/4/2024 0743 by Nirmala Avalos RN  Outcome: Progressing  Flowsheets (Taken 11/3/2024 1037)  Discharge to home or other facility with appropriate resources:   Identify barriers to discharge with patient and caregiver   Identify discharge learning needs (meds, wound care, etc)  11/3/2024 2220 by Jay Louis RN  Outcome: Progressing     Problem: Pain  Goal: Verbalizes/displays adequate comfort level or baseline comfort level  11/4/2024 0743 by Nirmala Avalos RN  Outcome: Progressing  Flowsheets (Taken 11/2/2024 0802)  Verbalizes/displays adequate comfort level or baseline comfort level:   Encourage patient to monitor pain and request assistance   Administer analgesics based on type and severity of pain and evaluate response   Consider cultural and social influences on pain and pain management   Assess pain using appropriate pain scale   Implement non-pharmacological measures as appropriate and evaluate response   Notify Licensed Independent Practitioner if interventions unsuccessful or patient reports new pain  11/3/2024 2220 by Jay Louis RN  Outcome: Progressing     Problem: Risk for Elopement  Goal: Patient will not exit the unit/facility without proper excort  11/3/2024 2220 by Jay Louis RN  Outcome: Progressing     Problem: Safety - Adult  Goal: Free from fall injury  11/4/2024 0743 by Nirmala Avalos RN  Outcome: Progressing  Flowsheets (Taken 11/3/2024 1037)  Free From Fall Injury: Instruct family/caregiver on patient safety  11/3/2024 2220 by Jay Louis RN  Outcome: Progressing     
  Problem: Discharge Planning  Goal: Discharge to home or other facility with appropriate resources  11/5/2024 0505 by Jay Louis RN  Outcome: Progressing  11/4/2024 2210 by Jay Louis RN  Outcome: Progressing  Flowsheets (Taken 11/4/2024 0814 by Nirmala Avalos, RN)  Discharge to home or other facility with appropriate resources: Identify barriers to discharge with patient and caregiver     Problem: Pain  Goal: Verbalizes/displays adequate comfort level or baseline comfort level  11/5/2024 0505 by Jay Louis RN  Outcome: Progressing  11/4/2024 2210 by Jay Louis RN  Outcome: Progressing     Problem: Risk for Elopement  Goal: Patient will not exit the unit/facility without proper excort  11/5/2024 0505 by Jay Louis RN  Outcome: Progressing  11/4/2024 2210 by Jay Louis RN  Outcome: Progressing     Problem: Safety - Adult  Goal: Free from fall injury  11/5/2024 0505 by Jay Louis RN  Outcome: Progressing  11/4/2024 2210 by Jay Louis RN  Outcome: Progressing     Problem: Skin/Tissue Integrity  Goal: Absence of new skin breakdown  Description: 1.  Monitor for areas of redness and/or skin breakdown  2.  Assess vascular access sites hourly  3.  Every 4-6 hours minimum:  Change oxygen saturation probe site  4.  Every 4-6 hours:  If on nasal continuous positive airway pressure, respiratory therapy assess nares and determine need for appliance change or resting period.  11/5/2024 0505 by Jay Louis RN  Outcome: Progressing  11/4/2024 2210 by Jay Louis RN  Outcome: Progressing     
  Problem: Discharge Planning  Goal: Discharge to home or other facility with appropriate resources  11/5/2024 0739 by Keisha Quezada RN  Outcome: Progressing  11/5/2024 0505 by Jay Louis RN  Outcome: Progressing  11/4/2024 2210 by Jay Louis RN  Outcome: Progressing  Flowsheets (Taken 11/4/2024 0814 by Nirmala Avalos RN)  Discharge to home or other facility with appropriate resources: Identify barriers to discharge with patient and caregiver     Problem: Pain  Goal: Verbalizes/displays adequate comfort level or baseline comfort level  11/5/2024 0739 by Keisha Quezada RN  Outcome: Progressing  11/5/2024 0505 by Jay Louis RN  Outcome: Progressing  11/4/2024 2210 by Jay Louis RN  Outcome: Progressing     Problem: Risk for Elopement  Goal: Patient will not exit the unit/facility without proper excort  11/5/2024 0739 by Keisha Quezada RN  Outcome: Progressing  11/5/2024 0505 by Jay Louis RN  Outcome: Progressing  11/4/2024 2210 by Jay Louis RN  Outcome: Progressing     Problem: Safety - Adult  Goal: Free from fall injury  11/5/2024 0739 by Keisha Quezada RN  Outcome: Progressing  11/5/2024 0505 by Jay Louis RN  Outcome: Progressing  11/4/2024 2210 by Jay Louis RN  Outcome: Progressing     Problem: Skin/Tissue Integrity  Goal: Absence of new skin breakdown  Description: 1.  Monitor for areas of redness and/or skin breakdown  2.  Assess vascular access sites hourly  3.  Every 4-6 hours minimum:  Change oxygen saturation probe site  4.  Every 4-6 hours:  If on nasal continuous positive airway pressure, respiratory therapy assess nares and determine need for appliance change or resting period.  11/5/2024 0739 by Keisha Quezada RN  Outcome: Progressing  11/5/2024 0505 by Jay Louis RN  Outcome: Progressing  11/4/2024 2210 by Jay Louis RN  Outcome: Progressing     
  Problem: Discharge Planning  Goal: Discharge to home or other facility with appropriate resources  11/5/2024 1640 by Keisha Quezada RN  Outcome: Completed  Flowsheets (Taken 11/5/2024 0932 by Prasad Rangel)  Discharge to home or other facility with appropriate resources: Identify barriers to discharge with patient and caregiver  11/5/2024 0739 by Keisha Quezada RN  Outcome: Progressing  11/5/2024 0505 by Jay Louis RN  Outcome: Progressing     Problem: Pain  Goal: Verbalizes/displays adequate comfort level or baseline comfort level  11/5/2024 1640 by Keisha Quezada RN  Outcome: Completed  11/5/2024 0739 by Keisha Quezada RN  Outcome: Progressing  11/5/2024 0505 by Jay Louis RN  Outcome: Progressing     Problem: Risk for Elopement  Goal: Patient will not exit the unit/facility without proper excort  11/5/2024 1640 by Keisha uQezada RN  Outcome: Completed  11/5/2024 0739 by Keisha Quezada RN  Outcome: Progressing  11/5/2024 0505 by Jay Louis RN  Outcome: Progressing     Problem: Safety - Adult  Goal: Free from fall injury  11/5/2024 1640 by Keisha Quezada RN  Outcome: Completed  11/5/2024 0739 by Keisha Quezada RN  Outcome: Progressing  11/5/2024 0505 by Jay Louis RN  Outcome: Progressing     Problem: Skin/Tissue Integrity  Goal: Absence of new skin breakdown  Description: 1.  Monitor for areas of redness and/or skin breakdown  2.  Assess vascular access sites hourly  3.  Every 4-6 hours minimum:  Change oxygen saturation probe site  4.  Every 4-6 hours:  If on nasal continuous positive airway pressure, respiratory therapy assess nares and determine need for appliance change or resting period.  11/5/2024 1640 by Keisha Quezada RN  Outcome: Completed  11/5/2024 0739 by Keisha Quezada RN  Outcome: Progressing  11/5/2024 0505 by Jay Louis RN  Outcome: Progressing     
  Problem: Discharge Planning  Goal: Discharge to home or other facility with appropriate resources  Outcome: Progressing     Problem: Pain  Goal: Verbalizes/displays adequate comfort level or baseline comfort level  Outcome: Progressing     Problem: Risk for Elopement  Goal: Patient will not exit the unit/facility without proper excort  Outcome: Progressing     Problem: Safety - Adult  Goal: Free from fall injury  Outcome: Progressing     
  Problem: Discharge Planning  Goal: Discharge to home or other facility with appropriate resources  Outcome: Progressing  Flowsheets (Taken 10/31/2024 2056)  Discharge to home or other facility with appropriate resources:   Identify barriers to discharge with patient and caregiver   Arrange for needed discharge resources and transportation as appropriate   Identify discharge learning needs (meds, wound care, etc)     Problem: Pain  Goal: Verbalizes/displays adequate comfort level or baseline comfort level  Outcome: Progressing     Problem: Risk for Elopement  Goal: Patient will not exit the unit/facility without proper excort  Outcome: Progressing  Flowsheets (Taken 10/31/2024 2056)  Nursing Interventions for Elopement Risk:   Assist with personal care needs such as toileting, eating, dressing, as needed to reduce the risk of wandering   Communicate/escalate to charge nurse the risk of elopement   Reduce environmental triggers     
  Problem: Discharge Planning  Goal: Discharge to home or other facility with appropriate resources  Outcome: Progressing  Flowsheets (Taken 11/1/2024 1920)  Discharge to home or other facility with appropriate resources:   Identify barriers to discharge with patient and caregiver   Arrange for needed discharge resources and transportation as appropriate   Identify discharge learning needs (meds, wound care, etc)     Problem: Pain  Goal: Verbalizes/displays adequate comfort level or baseline comfort level  Outcome: Progressing     Problem: Risk for Elopement  Goal: Patient will not exit the unit/facility without proper excort  Outcome: Progressing     Problem: Safety - Adult  Goal: Free from fall injury  11/2/2024 0056 by Kleber Adames, RN  Outcome: Progressing  11/1/2024 1238 by Amy Richard, RN  Outcome: Progressing     
  Problem: Discharge Planning  Goal: Discharge to home or other facility with appropriate resources  Outcome: Progressing  Flowsheets (Taken 11/4/2024 0814 by Nirmala Avalos, RN)  Discharge to home or other facility with appropriate resources: Identify barriers to discharge with patient and caregiver     Problem: Pain  Goal: Verbalizes/displays adequate comfort level or baseline comfort level  Outcome: Progressing     Problem: Risk for Elopement  Goal: Patient will not exit the unit/facility without proper excort  Outcome: Progressing     Problem: Safety - Adult  Goal: Free from fall injury  Outcome: Progressing     Problem: Physical Therapy - Adult  Goal: By Discharge: Performs mobility at highest level of function for planned discharge setting.  See evaluation for individualized goals.  Description: FUNCTIONAL STATUS PRIOR TO ADMISSION: Per pt, he ambulates with RW at baseline. He reports he has been at a nursing facility for rehab for \"too long\".     HOME SUPPORT PRIOR TO ADMISSION: The patient lived with roommates but reports limited support if needed.    Physical Therapy Goals  Initiated 11/2/2024  1.  Patient will move from supine to sit and sit to supine, scoot up and down, and roll side to side in bed with modified independence within 7 day(s).    2.  Patient will perform sit to stand with modified independence within 7 day(s).  3.  Patient will transfer from bed to chair and chair to bed with modified independence using the least restrictive device within 7 day(s).  4.  Patient will ambulate with supervision/set-up for 50 feet with the least restrictive device within 7 day(s).   5.  Patient will ascend/descend 14 stairs with B handrail(s) with supervision/set-up within 7 day(s).   11/4/2024 1336 by Guerda Pearson, PT  Outcome: Progressing     Problem: Skin/Tissue Integrity  Goal: Absence of new skin breakdown  Description: 1.  Monitor for areas of redness and/or skin breakdown  2.  Assess vascular access 
  Problem: Risk for Elopement  Goal: Patient will not exit the unit/facility without proper excort  11/1/2024 0606 by Kleber Adames RN  Outcome: Progressing  Flowsheets (Taken 10/31/2024 2056)  Nursing Interventions for Elopement Risk:   Assist with personal care needs such as toileting, eating, dressing, as needed to reduce the risk of wandering   Communicate/escalate to charge nurse the risk of elopement   Reduce environmental triggers     Problem: Risk for Elopement  Goal: Patient will not exit the unit/facility without proper excort  11/1/2024 0606 by Kleber Adames RN  Outcome: Progressing  Flowsheets (Taken 10/31/2024 2056)  Nursing Interventions for Elopement Risk:   Assist with personal care needs such as toileting, eating, dressing, as needed to reduce the risk of wandering   Communicate/escalate to charge nurse the risk of elopement   Reduce environmental triggers     Problem: Safety - Adult  Goal: Free from fall injury  Outcome: Progressing     
Continue treatment per established plan of care.        Recommendation for discharge: (in order for the patient to meet his/her long term goals):   Moderate intensity short-term skilled physical therapy up to 5x/week    Other factors to consider for discharge: patient's current support system is unable to meet their requirements for physical assistance, poor safety awareness, impaired cognition, and not safe to be alone    IF patient discharges home will need the following DME: patient owns DME required for discharge       SUBJECTIVE:   Patient stated, \"Physical Therapy needs a new name.\"    OBJECTIVE DATA SUMMARY:   Critical Behavior:  Orientation  Overall Orientation Status: Impaired  Orientation Level: Oriented to person;Oriented to place  Cognition  Overall Cognitive Status: Exceptions  Following Commands: Follows one step commands with repetition  Attention Span: Attends with cues to redirect;Difficulty attending to directions  Memory: Decreased recall of recent events  Safety Judgement: Decreased awareness of need for safety;Decreased awareness of need for assistance  Problem Solving: Decreased awareness of errors;Assistance required to generate solutions;Assistance required to implement solutions  Insights: Decreased awareness of deficits  Sequencing: Requires cues for some    Functional Mobility Training:  Bed Mobility:  Bed Mobility Training  Bed Mobility Training: Yes  Scooting: Contact-guard assistance (received sitting EOB)  Transfers:  Transfer Training  Transfer Training: Yes  Sit to Stand: Contact-guard assistance;Minimum assistance  Stand to Sit: Contact-guard assistance  Bed to Chair: Minimum assistance;Adaptive equipment;Additional time  Toilet Transfer: Minimum assistance;Adaptive equipment;Additional time (infer to/from [facility])  Balance:  Balance  Sitting: Impaired  Sitting - Static: Good (unsupported)  Sitting - Dynamic: Fair (occasional)  Standing: Impaired  Standing - Static: Fair;Constant 
family/caregiver on patient safety  11/2/2024 0056 by Kleber Adames, RN  Outcome: Progressing     
equipment  Sit to Supine: Contact-guard assistance;Additional time;Adaptive equipment  Transfers:     Transfer Training  Transfer Training: Yes  Sit to Stand: Contact-guard assistance  Stand to Sit: Contact-guard assistance  Balance:               Balance  Sitting: Intact  Standing: Impaired  Standing - Static: Fair;Constant support  Standing - Dynamic: Fair;Constant support  Ambulation/Gait Training:                                                                                                                                                                                                                                                                                        [facility] AM-PAC®      Basic Mobility Inpatient Short Form (6-Clicks) Version 2    How much help is needed turning from your back to your side while in a flat bed without using bedrails?: A Little  How much help is needed moving from lying on your back to sitting on the side of a flat bed without using bedrails?: A Little  How much help is needed moving to and from a bed to a chair?: Total  How much help is needed standing up from a chair using your arms?: Total  How much help is needed walking in hospital room?: Total  How much help is needed climbing 3-5 steps with a railing?: Total    AM-PAC Inpatient Mobility Raw Score : 10  -PAC Inpatient T-Scale Score : 32.29     Cutoff score <=171,2,3 had higher odds of discharging home with home health or need of SNF/IPR.    1. Linda Rodriguez, Denise Sheriff, Tomas Young, Marcela Blunt, Aj Foster, Refugio Rodriguez.  Validity of the AM-PAC “6-Clicks” Inpatient Daily Activity and Basic Mobility Short Forms. Physical Therapy Mar 2014, 94 (3) 379-391; DOI: [email or web address]/ptj.[email or web address]  2. Lucio BARRIOS, Dillon ANGELES, Carline ANGELES, Karsten ANGELES. Association of AM-PAC \"6-Clicks\" Basic Mobility and Daily Activity Scores With Discharge Destination. Phys Ther. 2021 Apr 4;101(4):idem810. doi: 
Encouraged patient to increase frequency and duration OOB, be out of bed for all meals, perform daily ADLs (as approved by RN/MD regarding bathing etc), and performing functional mobility to/from bathroom.     Patient educated on safe transfer techniques, with specific emphasis on proper hand placement to push up from seated surface rather than attempt to pull self up, fully positioning self in-front of desired seated location, feeling chair on back of legs and reaching back with 1-2 UE to slowly lower self to seated position.       [facility] \"6 Clicks\"                                                       Daily Activity Inpatient Short Form  How much help from another person does the patient currently need... Total; A Lot A Little None   1.  Putting on and taking off regular lower body clothing? []  1 []  2 [x]  3 []  4   2.  Bathing (including washing, rinsing, drying)? []  1 []  2 [x]  3 []  4   3.  Toileting, which includes using toilet, bedpan or urinal? [] 1 []  2 [x]  3 []  4   4.  Putting on and taking off regular upper body clothing? []  1 []  2 [x]  3 []  4   5.  Taking care of personal grooming such as brushing teeth? []  1 []  2 [x]  3 []  4   6.  Eating meals? []  1 []  2 [x]  3 []  4   © 2007, Trustees of [facility], under license to [employer]. All rights reserved     Score: 18/24     Interpretation of Tool:  Represents clinically-significant functional categories (i.e. Activities of daily living).    Cutoff score 39.4 (19) correlates to a good likelihood of discharging home versus a facility  Linda Rodriguez, Denise Sheriff, Tomas Young, Marcela Blunt, Aj Foster, Refugio Rodriguez, -PAC “6-Clicks” Functional Assessment Scores Predict Acute Care Hospital Discharge Destination, Physical Therapy, Volume 94, Issue 9, 1 September 2014, Pages 8473-0088, [email or web address]    Pain Rating:  No reports.     Pain Intervention(s):   rest    Activity

## 2024-11-06 ENCOUNTER — CARE COORDINATION (OUTPATIENT)
Dept: CARE COORDINATION | Age: 79
End: 2024-11-06

## 2024-11-06 LAB
VAS LEFT CCA DIST EDV: 11 CM/S
VAS LEFT CCA DIST PSV: 51.6 CM/S
VAS LEFT CCA PROX EDV: 11.6 CM/S
VAS LEFT CCA PROX PSV: 51.6 CM/S
VAS LEFT ECA EDV: 13.9 CM/S
VAS LEFT ECA PSV: 114.3 CM/S
VAS LEFT ICA DIST EDV: 11.8 CM/S
VAS LEFT ICA DIST PSV: 41.7 CM/S
VAS LEFT ICA MID EDV: 14.1 CM/S
VAS LEFT ICA MID PSV: 57.5 CM/S
VAS LEFT ICA PROX EDV: 14.1 CM/S
VAS LEFT ICA PROX PSV: 49 CM/S
VAS LEFT ICA/CCA PSV: 1.1 NO UNITS
VAS LEFT SUBCLAVIAN PROX EDV: 0 CM/S
VAS LEFT SUBCLAVIAN PROX PSV: 106 CM/S
VAS LEFT VERTEBRAL EDV: 13.6 CM/S
VAS LEFT VERTEBRAL PSV: 45.9 CM/S
VAS RIGHT CCA DIST EDV: 18.2 CM/S
VAS RIGHT CCA DIST PSV: 72.1 CM/S
VAS RIGHT CCA PROX EDV: 8.7 CM/S
VAS RIGHT CCA PROX PSV: 62.5 CM/S
VAS RIGHT ECA EDV: 13.9 CM/S
VAS RIGHT ECA PSV: 121.6 CM/S
VAS RIGHT ICA DIST EDV: 9.9 CM/S
VAS RIGHT ICA DIST PSV: 32.1 CM/S
VAS RIGHT ICA MID EDV: 14.5 CM/S
VAS RIGHT ICA MID PSV: 56.4 CM/S
VAS RIGHT ICA PROX EDV: 16.2 CM/S
VAS RIGHT ICA PROX PSV: 57.2 CM/S
VAS RIGHT ICA/CCA PSV: 0.8 NO UNITS
VAS RIGHT SUBCLAVIAN PROX EDV: 0 CM/S
VAS RIGHT SUBCLAVIAN PROX PSV: 123.3 CM/S
VAS RIGHT VERTEBRAL EDV: 8.9 CM/S
VAS RIGHT VERTEBRAL PSV: 32.6 CM/S

## 2024-11-06 NOTE — CARE COORDINATION
SECURE email notification sent to identified IDT members at   [facility]

## 2025-04-11 ENCOUNTER — HOSPITAL ENCOUNTER (EMERGENCY)
Facility: HOSPITAL | Age: 80
Discharge: HOME | End: 2025-04-12
Attending: EMERGENCY MEDICINE
Payer: MEDICARE

## 2025-04-11 ENCOUNTER — APPOINTMENT (OUTPATIENT)
Facility: HOSPITAL | Age: 80
End: 2025-04-11
Payer: MEDICARE

## 2025-04-11 VITALS
SYSTOLIC BLOOD PRESSURE: 143 MMHG | OXYGEN SATURATION: 99 % | RESPIRATION RATE: 16 BRPM | HEART RATE: 60 BPM | TEMPERATURE: 98.2 F | DIASTOLIC BLOOD PRESSURE: 59 MMHG

## 2025-04-11 DIAGNOSIS — R10.9 ABDOMINAL PAIN, UNSPECIFIED ABDOMINAL LOCATION: ICD-10-CM

## 2025-04-11 DIAGNOSIS — R11.2 NAUSEA AND VOMITING, UNSPECIFIED VOMITING TYPE: Primary | ICD-10-CM

## 2025-04-11 LAB
ALBUMIN SERPL-MCNC: 3.7 G/DL (ref 3.5–5)
ALBUMIN/GLOB SERPL: 1 (ref 1.1–2.2)
ALP SERPL-CCNC: 67 U/L (ref 45–117)
ALT SERPL-CCNC: 14 U/L (ref 12–78)
ANION GAP SERPL CALC-SCNC: 3 MMOL/L (ref 2–12)
APPEARANCE UR: CLEAR
AST SERPL-CCNC: 20 U/L (ref 15–37)
BACTERIA URNS QL MICRO: NEGATIVE /HPF
BASOPHILS # BLD: 0.08 K/UL (ref 0–0.1)
BASOPHILS NFR BLD: 0.7 % (ref 0–1)
BILIRUB SERPL-MCNC: 0.4 MG/DL (ref 0.2–1)
BILIRUB UR QL: NEGATIVE
BUN SERPL-MCNC: 35 MG/DL (ref 6–20)
BUN/CREAT SERPL: 22 (ref 12–20)
CALCIUM SERPL-MCNC: 9.8 MG/DL (ref 8.5–10.1)
CHLORIDE SERPL-SCNC: 103 MMOL/L (ref 97–108)
CO2 SERPL-SCNC: 32 MMOL/L (ref 21–32)
COLOR UR: ABNORMAL
COMMENT:: NORMAL
CREAT SERPL-MCNC: 1.61 MG/DL (ref 0.7–1.3)
DIFFERENTIAL METHOD BLD: ABNORMAL
EKG ATRIAL RATE: 60 BPM
EKG DIAGNOSIS: NORMAL
EKG P AXIS: 117 DEGREES
EKG P-R INTERVAL: 176 MS
EKG Q-T INTERVAL: 436 MS
EKG QRS DURATION: 92 MS
EKG QTC CALCULATION (BAZETT): 436 MS
EKG R AXIS: -39 DEGREES
EKG T AXIS: 135 DEGREES
EKG VENTRICULAR RATE: 60 BPM
EOSINOPHIL # BLD: 0.23 K/UL (ref 0–0.4)
EOSINOPHIL NFR BLD: 2 % (ref 0–7)
EPITH CASTS URNS QL MICRO: ABNORMAL /LPF
ERYTHROCYTE [DISTWIDTH] IN BLOOD BY AUTOMATED COUNT: 13.3 % (ref 11.5–14.5)
GLOBULIN SER CALC-MCNC: 3.8 G/DL (ref 2–4)
GLUCOSE SERPL-MCNC: 102 MG/DL (ref 65–100)
GLUCOSE UR STRIP.AUTO-MCNC: NEGATIVE MG/DL
HCT VFR BLD AUTO: 39.2 % (ref 36.6–50.3)
HGB BLD-MCNC: 13.1 G/DL (ref 12.1–17)
HGB UR QL STRIP: ABNORMAL
IMM GRANULOCYTES # BLD AUTO: 0.07 K/UL (ref 0–0.04)
IMM GRANULOCYTES NFR BLD AUTO: 0.6 % (ref 0–0.5)
KETONES UR QL STRIP.AUTO: ABNORMAL MG/DL
LEUKOCYTE ESTERASE UR QL STRIP.AUTO: ABNORMAL
LYMPHOCYTES # BLD: 2.24 K/UL (ref 0.8–3.5)
LYMPHOCYTES NFR BLD: 19.8 % (ref 12–49)
MCH RBC QN AUTO: 31.5 PG (ref 26–34)
MCHC RBC AUTO-ENTMCNC: 33.4 G/DL (ref 30–36.5)
MCV RBC AUTO: 94.2 FL (ref 80–99)
MONOCYTES # BLD: 0.88 K/UL (ref 0–1)
MONOCYTES NFR BLD: 7.8 % (ref 5–13)
NEUTS SEG # BLD: 7.82 K/UL (ref 1.8–8)
NEUTS SEG NFR BLD: 69.1 % (ref 32–75)
NITRITE UR QL STRIP.AUTO: NEGATIVE
NRBC # BLD: 0 K/UL (ref 0–0.01)
NRBC BLD-RTO: 0 PER 100 WBC
PH UR STRIP: 6 (ref 5–8)
PLATELET # BLD AUTO: 251 K/UL (ref 150–400)
PMV BLD AUTO: 12.1 FL (ref 8.9–12.9)
POTASSIUM SERPL-SCNC: 3.9 MMOL/L (ref 3.5–5.1)
PROT SERPL-MCNC: 7.5 G/DL (ref 6.4–8.2)
PROT UR STRIP-MCNC: ABNORMAL MG/DL
RBC # BLD AUTO: 4.16 M/UL (ref 4.1–5.7)
RBC #/AREA URNS HPF: ABNORMAL /HPF (ref 0–5)
SODIUM SERPL-SCNC: 138 MMOL/L (ref 136–145)
SP GR UR REFRACTOMETRY: 1.02 (ref 1–1.03)
SPECIMEN HOLD: NORMAL
SPECIMEN HOLD: NORMAL
TROPONIN I SERPL HS-MCNC: 40 NG/L (ref 0–76)
UROBILINOGEN UR QL STRIP.AUTO: 1 EU/DL (ref 0.2–1)
WBC # BLD AUTO: 11.3 K/UL (ref 4.1–11.1)
WBC URNS QL MICRO: ABNORMAL /HPF (ref 0–4)

## 2025-04-11 PROCEDURE — 99285 EMERGENCY DEPT VISIT HI MDM: CPT

## 2025-04-11 PROCEDURE — 81001 URINALYSIS AUTO W/SCOPE: CPT

## 2025-04-11 PROCEDURE — 70450 CT HEAD/BRAIN W/O DYE: CPT

## 2025-04-11 PROCEDURE — 71045 X-RAY EXAM CHEST 1 VIEW: CPT

## 2025-04-11 PROCEDURE — 74177 CT ABD & PELVIS W/CONTRAST: CPT

## 2025-04-11 PROCEDURE — 93005 ELECTROCARDIOGRAM TRACING: CPT | Performed by: EMERGENCY MEDICINE

## 2025-04-11 PROCEDURE — 6360000004 HC RX CONTRAST MEDICATION: Performed by: EMERGENCY MEDICINE

## 2025-04-11 PROCEDURE — 96361 HYDRATE IV INFUSION ADD-ON: CPT

## 2025-04-11 PROCEDURE — 96360 HYDRATION IV INFUSION INIT: CPT

## 2025-04-11 PROCEDURE — 84484 ASSAY OF TROPONIN QUANT: CPT

## 2025-04-11 PROCEDURE — 2580000003 HC RX 258: Performed by: EMERGENCY MEDICINE

## 2025-04-11 PROCEDURE — 85025 COMPLETE CBC W/AUTO DIFF WBC: CPT

## 2025-04-11 PROCEDURE — 80053 COMPREHEN METABOLIC PANEL: CPT

## 2025-04-11 RX ORDER — 0.9 % SODIUM CHLORIDE 0.9 %
500 INTRAVENOUS SOLUTION INTRAVENOUS ONCE
Status: COMPLETED | OUTPATIENT
Start: 2025-04-11 | End: 2025-04-12

## 2025-04-11 RX ORDER — ONDANSETRON 4 MG/1
4 TABLET, ORALLY DISINTEGRATING ORAL 3 TIMES DAILY PRN
Qty: 21 TABLET | Refills: 0 | Status: SHIPPED | OUTPATIENT
Start: 2025-04-11

## 2025-04-11 RX ORDER — ONDANSETRON 4 MG/1
4 TABLET, ORALLY DISINTEGRATING ORAL 3 TIMES DAILY PRN
Qty: 21 TABLET | Refills: 0 | Status: SHIPPED | OUTPATIENT
Start: 2025-04-11 | End: 2025-04-11

## 2025-04-11 RX ORDER — IOPAMIDOL 755 MG/ML
100 INJECTION, SOLUTION INTRAVASCULAR
Status: COMPLETED | OUTPATIENT
Start: 2025-04-11 | End: 2025-04-11

## 2025-04-11 RX ADMIN — IOPAMIDOL 100 ML: 755 INJECTION, SOLUTION INTRAVENOUS at 17:36

## 2025-04-11 RX ADMIN — SODIUM CHLORIDE 500 ML: 0.9 INJECTION, SOLUTION INTRAVENOUS at 16:12

## 2025-04-11 ASSESSMENT — ENCOUNTER SYMPTOMS
STRIDOR: 0
BLOOD IN STOOL: 0
DIARRHEA: 1
BACK PAIN: 0
SHORTNESS OF BREATH: 0
NAUSEA: 1
RHINORRHEA: 0
CHEST TIGHTNESS: 0
ABDOMINAL PAIN: 1
TROUBLE SWALLOWING: 0
SINUS PRESSURE: 0
COLOR CHANGE: 0
EYES NEGATIVE: 1
SORE THROAT: 0
WHEEZING: 0
COUGH: 0
VOMITING: 1
SINUS PAIN: 0

## 2025-04-11 NOTE — DISCHARGE INSTRUCTIONS
Use the medications as directed for nausea and vomiting.  You will need to follow-up with GI physician listed above if you continue to have difficulty.  The symptoms have apparently been ongoing for a while.  All of your x-rays, CAT scans, labs as well as urine appear unremarkable.  He has been given a small amount of IV fluid here and will be sent home with medication for nausea or vomiting.  Follow-up with your own physician if continued difficulty as well.

## 2025-04-11 NOTE — ED TRIAGE NOTES
Pt was sent from [facility] for AMS and N/V. Pt reports having the N/V sometime after eating lunch. Pt is A&Ox3 skin warm and dry, NAD, resting on stretcher.

## 2025-04-11 NOTE — ED PROVIDER NOTES
[facility] EMERGENCY DEPARTMENT  EMERGENCY DEPARTMENT ENCOUNTER      Pt Name: Anshu Bonilla Jr.  MRN: [id number]  Birthdate 1945  Date of evaluation: 4/11/2025  Provider: Hayden Pal MD    CHIEF COMPLAINT       Chief Complaint   Patient presents with    Nausea    Altered Mental Status         HISTORY OF PRESENT ILLNESS    This is a 80-year-old male presents from the nursing home for complaints of bilateral weakness with some altered mental status and nausea and vomiting with some diarrhea.  In speaking with the patient he seems to be fairly alert and oriented.  He complains of some abdominal pain on the left side which she has had for a number of months.  He has had nausea and vomiting and diarrhea he says for about a year.  He denies any fever or chill and no shortness of breath.  Has had no chest pain.  He denies any urinary symptoms.  He is not sure exactly why they sent him to the hospital.  Patient voices no other acute complaint.            Review of External Medical Records:     Nursing Notes were reviewed.    REVIEW OF SYSTEMS       Review of Systems   Constitutional:  Negative for activity change, chills, fatigue and fever.   HENT:  Negative for congestion, ear pain, rhinorrhea, sinus pressure, sinus pain, sore throat and trouble swallowing.    Eyes: Negative.    Respiratory:  Negative for cough, chest tightness, shortness of breath, wheezing and stridor.    Cardiovascular:  Negative for chest pain, palpitations and leg swelling.   Gastrointestinal:  Positive for abdominal pain, diarrhea, nausea and vomiting. Negative for blood in stool.   Endocrine: Negative.    Genitourinary:  Negative for decreased urine volume, difficulty urinating, flank pain, frequency and hematuria.   Musculoskeletal:  Negative for back pain, joint swelling and neck pain.   Skin:  Negative for color change, pallor and rash.   Neurological:  Negative for dizziness, syncope, speech difficulty, weakness, numbness and  Vital Sign     Unable to Pay for Housing in the Last Year: No     Number of Places Lived in the Last Year: 1     Unstable Housing in the Last Year: No           PHYSICAL EXAM       ED Triage Vitals [04/11/25 1445]   BP Systolic BP Percentile Diastolic BP Percentile Temp Temp src Pulse Respirations SpO2   (!) 158/82 -- -- -- -- 66 14 100 %      Height Weight         -- --             There is no height or weight on file to calculate BMI.    Physical Exam  Vitals and nursing note reviewed.   Constitutional:       General: He is not in acute distress.     Appearance: He is ill-appearing (Chronically). He is not diaphoretic.   HENT:      Head: Normocephalic and atraumatic.      Right Ear: External ear normal.      Left Ear: External ear normal.      Nose: Nose normal. No congestion or rhinorrhea.      Mouth/Throat:      Mouth: Mucous membranes are moist.   Eyes:      Extraocular Movements: Extraocular movements intact.      Conjunctiva/sclera: Conjunctivae normal.      Pupils: Pupils are equal, round, and reactive to light.   Neck:      Vascular: No carotid bruit.   Cardiovascular:      Rate and Rhythm: Normal rate and regular rhythm.      Pulses: Normal pulses.      Heart sounds: Normal heart sounds.   Pulmonary:      Effort: Pulmonary effort is normal. No respiratory distress.      Breath sounds: Normal breath sounds. No wheezing, rhonchi or rales.   Abdominal:      General: Abdomen is flat. Bowel sounds are normal.      Palpations: Abdomen is soft.      Tenderness: There is abdominal tenderness (Some tenderness to palpation is noted in the left lateral flank area and left lower quadrant.  Mild guarding is noted.  Bowel sounds are diminished.). There is no guarding.   Musculoskeletal:         General: No swelling or tenderness.      Cervical back: Normal range of motion. No rigidity or tenderness.   Skin:     General: Skin is warm and dry.      Capillary Refill: Capillary refill takes 2 to 3 seconds.      Coloration:

## 2025-04-11 NOTE — ED NOTES
[facility].     Address: [phone]

## 2025-04-12 PROCEDURE — 96361 HYDRATE IV INFUSION ADD-ON: CPT

## (undated) DEVICE — SPLINT WR VELC FOAM NEUT POS DISP FOR RAD ART ACC SFT STRP

## (undated) DEVICE — MEDI-TRACE CADENCE ADULT, DEFIBRILLATION ELECTRODE -RTS  (10 PR/PK) - PHYSIO-CONTROL: Brand: MEDI-TRACE CADENCE

## (undated) DEVICE — NEEDLE HYPO 18GA L1.5IN PNK S STL HUB POLYPR SHLD REG BVL

## (undated) DEVICE — SYRINGE MED 20ML STD CLR PLAS LUERLOCK TIP N CTRL DISP

## (undated) DEVICE — SYRINGE ANGIO 10 CC BRL STD PRNT POLYCARB LT BLU MEDALLION

## (undated) DEVICE — 3M™ TEGADERM™ TRANSPARENT FILM DRESSING FRAME STYLE, 1626W, 4 IN X 4-3/4 IN (10 CM X 12 CM), 50/CT 4CT/CASE: Brand: 3M™ TEGADERM™

## (undated) DEVICE — CATHETER ANGIO L100CM DIA5FR MP A CRV COR INSLIDE ADD DBL

## (undated) DEVICE — BW-412T DISP COMBO CLEANING BRUSH: Brand: SINGLE USE COMBINATION CLEANING BRUSH

## (undated) DEVICE — WRISTBAND ID AD W1XL11.5IN RED POLY ALRG PREPRINTED PERM

## (undated) DEVICE — CATHETER DIAG 5FR L100CM LUMN ID0.047IN JL3.5 CRV 0 SIDE H

## (undated) DEVICE — TR BAND RADIAL ARTERY COMPRESSION DEVICE: Brand: TR BAND

## (undated) DEVICE — CONNECTOR TBNG AUX H2O JET DISP FOR OLY 160/180 SER

## (undated) DEVICE — TUBING PRSS MON L6IN PVC M FEM CONN

## (undated) DEVICE — SUTURE COAT VCRL SZ 4-0 L18IN ABSRB UD L19MM PS-2 1/2 CIR J496G

## (undated) DEVICE — CONTAINER SPEC 20 ML LID NEUT BUFF FORMALIN 10 % POLYPR STS

## (undated) DEVICE — BAG SPEC BIOHZD LF 2MIL 6X10IN -- CONVERT TO ITEM 357326

## (undated) DEVICE — KENDALL RADIOLUCENT FOAM MONITORING ELECTRODE -RECTANGULAR SHAPE: Brand: KENDALL

## (undated) DEVICE — SUTURE PERMAHAND SZ 0 L30IN NONABSORBABLE BLK L26MM SH 1/2 K834H

## (undated) DEVICE — KIT IV STRT W CHLORAPREP PD 1ML

## (undated) DEVICE — Z DISCONTINUED NO SUB IDED SET EXTN W/ 4 W STPCOCK M SPIN LOK 36IN

## (undated) DEVICE — PACK PROCEDURE SURG HRT CATH

## (undated) DEVICE — NEONATAL-ADULT SPO2 SENSOR: Brand: NELLCOR

## (undated) DEVICE — 1200 GUARD II KIT W/5MM TUBE W/O VAC TUBE: Brand: GUARDIAN

## (undated) DEVICE — SUTURE VCRL 2-0 L27IN ABSRB UD PS-2 L19MM 1/2 CIR J428H

## (undated) DEVICE — ENDO CARRY-ON PROCEDURE KIT INCLUDES ENZYMATIC SPONGE, GAUZE, BIOHAZARD LABEL, TRAY, LUBRICANT, DIRTY SCOPE LABEL, WATER LABEL, TRAY, DRAWSTRING PAD, AND DEFENDO 4-PIECE KIT.: Brand: ENDO CARRY-ON PROCEDURE KIT

## (undated) DEVICE — CANN NASAL O2 CAPNOGRAPHY AD -- FILTERLINE

## (undated) DEVICE — CATHETER DIAG 5FR L100CM LUMN ID0.047IN JR4 CRV 0 SIDE H

## (undated) DEVICE — BAG BELONG PT PERS CLEAR HANDL

## (undated) DEVICE — KIT ACCS INTRO 4FR L10CM NDL 21GA L7CM GWIRE L40CM

## (undated) DEVICE — SOLIDIFIER FLUID 3000 CC ABSORB

## (undated) DEVICE — RADIFOCUS GLIDEWIRE: Brand: GLIDEWIRE

## (undated) DEVICE — PACEMAKER PACK: Brand: MEDLINE INDUSTRIES, INC.

## (undated) DEVICE — CATH IV AUTOGRD BC BLU 22GA 25 -- INSYTE

## (undated) DEVICE — ELECTRODE PT RET AD L9FT HI MOIST COND ADH HYDRGEL CORDED

## (undated) DEVICE — GLIDESHEATH SLENDER ACCESS KIT: Brand: GLIDESHEATH SLENDER

## (undated) DEVICE — QUILTED PREMIUM COMFORT UNDERPAD,EXTRA HEAVY: Brand: WINGS

## (undated) DEVICE — BENTSON WIRE GUIDE 20CM DISTAL FLEXIBILITY WITH SOFTENED TIP: Brand: BENTSON

## (undated) DEVICE — PINNACLE INTRODUCER SHEATH: Brand: PINNACLE

## (undated) DEVICE — FORCEPS BX L240CM JAW DIA2.8MM L CAP W/ NDL MIC MESH TOOTH

## (undated) DEVICE — GUIDEWIRE ANGIO 0.038INX260CM 3MM J TIP FIX COR PTFE PERIPH

## (undated) DEVICE — AIRLIFE™ U/CONNECT-IT OXYGEN TUBING 7 FEET (2.1 M) CRUSH-RESISTANT OXYGEN TUBING, VINYL TIPPED: Brand: AIRLIFE™

## (undated) DEVICE — CATHETER DIAG 5FR L100CM AL AL 1.0 CRV SZ DBL BRAID WIRE

## (undated) DEVICE — SET ADMIN 16ML TBNG L100IN 2 Y INJ SITE IV PIGGY BK DISP

## (undated) DEVICE — Z CONVERTED USE 2274299 CUFF BLD PRESSURE LNG MED AD 25-35 CM ARM FLEXIPORT DISP

## (undated) DEVICE — KIT INTRO 9FR L13CM DIA0.118IN SPLITTABLE HEMSTAT ROBUST

## (undated) DEVICE — Z DISCONTINUED USE 2751540 TUBING IRRIG L10IN DISP PMP ENDOGATOR

## (undated) DEVICE — TRAY,IRRIGATION,PISTON SYRINGE,60ML,STRL: Brand: MEDLINE

## (undated) DEVICE — 3M™ IOBAN™ 2 ANTIMICROBIAL INCISE DRAPE 6650EZ: Brand: IOBAN™ 2

## (undated) DEVICE — INTRODUCER SHTH L13CM OD7FR SH ORNG HUB SEAMLESS SAFSHTH